# Patient Record
Sex: MALE | Race: OTHER | Employment: OTHER | URBAN - METROPOLITAN AREA
[De-identification: names, ages, dates, MRNs, and addresses within clinical notes are randomized per-mention and may not be internally consistent; named-entity substitution may affect disease eponyms.]

---

## 2017-05-12 NOTE — TELEPHONE ENCOUNTER
[place] and [place] not [place] in 2 months. Pt states he have enough medication. Advised to make appt when he return for future refills. Verbalized understanding.

## 2018-05-14 ENCOUNTER — PATIENT OUTREACH (OUTPATIENT)
Dept: CASE MANAGEMENT | Age: 73
End: 2018-05-14

## 2018-05-14 NOTE — PROGRESS NOTES
Outreached to patient in regards to enrollment to Chronic Care Management program. Patient stated that he no longer see's Dr. Harp Credit. Per patient he has a new PCP.

## 2020-04-19 ENCOUNTER — TELEPHONE (OUTPATIENT)
Dept: INTERNAL MEDICINE CLINIC | Facility: CLINIC | Age: 75
End: 2020-04-19

## 2021-06-22 ENCOUNTER — HOSPITAL ENCOUNTER (EMERGENCY)
Facility: HOSPITAL | Age: 76
Discharge: HOME | End: 2021-06-22
Payer: MEDICARE

## 2021-06-22 VITALS
WEIGHT: 200 LBS | TEMPERATURE: 98 F | HEIGHT: 65 IN | BODY MASS INDEX: 33.32 KG/M2 | OXYGEN SATURATION: 98 % | SYSTOLIC BLOOD PRESSURE: 128 MMHG | RESPIRATION RATE: 16 BRPM | DIASTOLIC BLOOD PRESSURE: 52 MMHG | HEART RATE: 64 BPM

## 2021-06-22 DIAGNOSIS — S61.411A LACERATION OF RIGHT HAND WITHOUT FOREIGN BODY, INITIAL ENCOUNTER: Primary | ICD-10-CM

## 2021-06-22 PROCEDURE — 99283 EMERGENCY DEPT VISIT LOW MDM: CPT

## 2021-06-22 PROCEDURE — 12002 RPR S/N/AX/GEN/TRNK2.6-7.5CM: CPT

## 2021-06-22 PROCEDURE — 99282 EMERGENCY DEPT VISIT SF MDM: CPT

## 2021-06-22 NOTE — ED PROVIDER NOTES
Patient Seen in: [facility] AND [facility] Emergency Department      History   Patient presents with:  Laceration/Abrasion    Stated Complaint: r hand lac    HPI/Subjective:   74yo/m with hx of GERD, HLD reports to the ED with right palm laceration.  Patient repo round, and reactive to light. Cardiovascular:      Rate and Rhythm: Normal rate and regular rhythm. Heart sounds: Normal heart sounds. Pulmonary:      Effort: Pulmonary effort is normal.      Breath sounds: Normal breath sounds.    Abdominal: days  For wound re-check    We recommend that you schedule follow up care with a primary care provider within the next three months to obtain basic health screening including reassessment of your blood pressure.       Medications Prescribed:  Current Discha

## 2021-06-22 NOTE — ED INITIAL ASSESSMENT (HPI)
Pt to ED with a lac to the right hand last night with a license plate. Pt's last tetanus was last year.

## 2021-06-22 NOTE — ED QUICK NOTES
Assumed care of patient from triage. Patient to ED from home for laceration. Patient reports cutting his right palm on a license plate while washing a car last night. No bleeding noted at this time.  Patient is alert, oriented x4, breathing with ease, in no

## 2021-09-11 ENCOUNTER — LAB REQUISITION (OUTPATIENT)
Dept: LAB | Age: 76
End: 2021-09-11

## 2021-09-11 ENCOUNTER — LAB SERVICES (OUTPATIENT)
Dept: LAB | Age: 76
End: 2021-09-11

## 2021-09-11 DIAGNOSIS — E11.9 TYPE 2 DIABETES MELLITUS WITHOUT COMPLICATIONS (CMD): ICD-10-CM

## 2021-09-11 DIAGNOSIS — E78.5 HYPERLIPIDEMIA, UNSPECIFIED: ICD-10-CM

## 2021-09-11 DIAGNOSIS — I10 ESSENTIAL (PRIMARY) HYPERTENSION: ICD-10-CM

## 2021-09-11 DIAGNOSIS — Z12.11 ENCOUNTER FOR SCREENING FOR MALIGNANT NEOPLASM OF COLON: ICD-10-CM

## 2021-09-11 DIAGNOSIS — Z12.5 ENCOUNTER FOR SCREENING FOR MALIGNANT NEOPLASM OF PROSTATE: ICD-10-CM

## 2021-09-11 LAB
ALBUMIN SERPL-MCNC: 3.7 G/DL (ref 3.6–5.1)
ALBUMIN/GLOB SERPL: 1.1 {RATIO} (ref 1–2.4)
ALP SERPL-CCNC: 93 UNITS/L (ref 45–117)
ALT SERPL-CCNC: 22 UNITS/L
ANION GAP SERPL CALC-SCNC: 12 MMOL/L (ref 10–20)
APPEARANCE UR: CLEAR
AST SERPL-CCNC: 17 UNITS/L
BILIRUB SERPL-MCNC: 0.6 MG/DL (ref 0.2–1)
BILIRUB UR QL STRIP: NEGATIVE
BUN SERPL-MCNC: 20 MG/DL (ref 6–20)
BUN/CREAT SERPL: 15 (ref 7–25)
CALCIUM SERPL-MCNC: 8.3 MG/DL (ref 8.4–10.2)
CHLORIDE SERPL-SCNC: 106 MMOL/L (ref 98–107)
CHOLEST SERPL-MCNC: 140 MG/DL
CHOLEST/HDLC SERPL: 4.1 {RATIO}
CO2 SERPL-SCNC: 27 MMOL/L (ref 21–32)
COLOR UR: YELLOW
CREAT SERPL-MCNC: 1.32 MG/DL (ref 0.67–1.17)
DEPRECATED RDW RBC: 42.7 FL (ref 39–50)
ERYTHROCYTE [DISTWIDTH] IN BLOOD: 13.3 % (ref 11–15)
FASTING DURATION TIME PATIENT: 12 HOURS (ref 0–999)
FASTING DURATION TIME PATIENT: 12 HOURS (ref 0–999)
GFR SERPLBLD BASED ON 1.73 SQ M-ARVRAT: 52 ML/MIN
GLOBULIN SER-MCNC: 3.3 G/DL (ref 2–4)
GLUCOSE SERPL-MCNC: 99 MG/DL (ref 65–99)
GLUCOSE UR STRIP-MCNC: NEGATIVE MG/DL
HBA1C MFR BLD: 6.4 % (ref 4.5–5.6)
HCT VFR BLD CALC: 40.2 % (ref 39–51)
HDLC SERPL-MCNC: 34 MG/DL
HGB BLD-MCNC: 13.1 G/DL (ref 13–17)
HGB UR QL STRIP: ABNORMAL
KETONES UR STRIP-MCNC: NEGATIVE MG/DL
LDLC SERPL CALC-MCNC: 68 MG/DL
LEUKOCYTE ESTERASE UR QL STRIP: NEGATIVE
MCH RBC QN AUTO: 28.5 PG (ref 26–34)
MCHC RBC AUTO-ENTMCNC: 32.6 G/DL (ref 32–36.5)
MCV RBC AUTO: 87.6 FL (ref 78–100)
NITRITE UR QL STRIP: NEGATIVE
NONHDLC SERPL-MCNC: 106 MG/DL
NRBC BLD MANUAL-RTO: 0 /100 WBC
PH UR STRIP: 7 [PH] (ref 5–7)
PLATELET # BLD AUTO: 193 K/MCL (ref 140–450)
POTASSIUM SERPL-SCNC: 5 MMOL/L (ref 3.4–5.1)
PROT SERPL-MCNC: 7 G/DL (ref 6.4–8.2)
PROT UR STRIP-MCNC: NEGATIVE MG/DL
PSA SERPL-MCNC: 2.87 NG/ML
RBC # BLD: 4.59 MIL/MCL (ref 4.5–5.9)
SODIUM SERPL-SCNC: 140 MMOL/L (ref 135–145)
SP GR UR STRIP: 1.01 (ref 1–1.03)
TRIGL SERPL-MCNC: 188 MG/DL
UROBILINOGEN UR STRIP-MCNC: 0.2 MG/DL
WBC # BLD: 7.5 K/MCL (ref 4.2–11)

## 2021-09-11 PROCEDURE — G0103 PSA SCREENING: HCPCS | Performed by: CLINICAL MEDICAL LABORATORY

## 2021-09-11 PROCEDURE — 36415 COLL VENOUS BLD VENIPUNCTURE: CPT | Performed by: CLINICAL MEDICAL LABORATORY

## 2021-09-11 PROCEDURE — 83036 HEMOGLOBIN GLYCOSYLATED A1C: CPT | Performed by: CLINICAL MEDICAL LABORATORY

## 2021-09-11 PROCEDURE — 85027 COMPLETE CBC AUTOMATED: CPT | Performed by: CLINICAL MEDICAL LABORATORY

## 2021-09-11 PROCEDURE — 80053 COMPREHEN METABOLIC PANEL: CPT | Performed by: CLINICAL MEDICAL LABORATORY

## 2021-09-11 PROCEDURE — 81003 URINALYSIS AUTO W/O SCOPE: CPT | Performed by: CLINICAL MEDICAL LABORATORY

## 2021-09-11 PROCEDURE — 80061 LIPID PANEL: CPT | Performed by: CLINICAL MEDICAL LABORATORY

## 2022-03-26 ENCOUNTER — LAB REQUISITION (OUTPATIENT)
Dept: LAB | Age: 77
End: 2022-03-26

## 2022-03-26 ENCOUNTER — LAB SERVICES (OUTPATIENT)
Dept: LAB | Age: 77
End: 2022-03-26

## 2022-03-26 DIAGNOSIS — E11.9 TYPE 2 DIABETES MELLITUS WITHOUT COMPLICATIONS (CMD): ICD-10-CM

## 2022-03-26 DIAGNOSIS — I10 ESSENTIAL (PRIMARY) HYPERTENSION: ICD-10-CM

## 2022-03-26 DIAGNOSIS — E78.5 HYPERLIPIDEMIA, UNSPECIFIED: ICD-10-CM

## 2022-03-26 LAB
ALBUMIN SERPL-MCNC: 3.9 G/DL (ref 3.6–5.1)
ALBUMIN/GLOB SERPL: 1.2 {RATIO} (ref 1–2.4)
ALP SERPL-CCNC: 99 UNITS/L (ref 45–117)
ALT SERPL-CCNC: 21 UNITS/L
ANION GAP SERPL CALC-SCNC: 11 MMOL/L (ref 10–20)
APPEARANCE UR: CLEAR
AST SERPL-CCNC: 14 UNITS/L
BILIRUB SERPL-MCNC: 0.7 MG/DL (ref 0.2–1)
BILIRUB UR QL STRIP: NEGATIVE
BUN SERPL-MCNC: 21 MG/DL (ref 6–20)
BUN/CREAT SERPL: 16 (ref 7–25)
CALCIUM SERPL-MCNC: 8.9 MG/DL (ref 8.4–10.2)
CHLORIDE SERPL-SCNC: 105 MMOL/L (ref 98–107)
CHOLEST SERPL-MCNC: 149 MG/DL
CHOLEST/HDLC SERPL: 3.9 {RATIO}
CO2 SERPL-SCNC: 27 MMOL/L (ref 21–32)
COLOR UR: YELLOW
CREAT SERPL-MCNC: 1.28 MG/DL (ref 0.67–1.17)
CREAT UR-MCNC: 85.7 MG/DL
DEPRECATED RDW RBC: 43.2 FL (ref 39–50)
ERYTHROCYTE [DISTWIDTH] IN BLOOD: 13.2 % (ref 11–15)
FASTING DURATION TIME PATIENT: 12 HOURS (ref 0–999)
FASTING DURATION TIME PATIENT: 12 HOURS (ref 0–999)
GFR SERPLBLD BASED ON 1.73 SQ M-ARVRAT: 54 ML/MIN
GLOBULIN SER-MCNC: 3.2 G/DL (ref 2–4)
GLUCOSE SERPL-MCNC: 100 MG/DL (ref 70–99)
GLUCOSE UR STRIP-MCNC: NEGATIVE MG/DL
HBA1C MFR BLD: 6.2 % (ref 4.5–5.6)
HCT VFR BLD CALC: 41.2 % (ref 39–51)
HDLC SERPL-MCNC: 38 MG/DL
HGB BLD-MCNC: 13.2 G/DL (ref 13–17)
HGB UR QL STRIP: ABNORMAL
KETONES UR STRIP-MCNC: NEGATIVE MG/DL
LDLC SERPL CALC-MCNC: 70 MG/DL
LEUKOCYTE ESTERASE UR QL STRIP: NEGATIVE
MCH RBC QN AUTO: 28.4 PG (ref 26–34)
MCHC RBC AUTO-ENTMCNC: 32 G/DL (ref 32–36.5)
MCV RBC AUTO: 88.6 FL (ref 78–100)
MICROALBUMIN UR-MCNC: 0.69 MG/DL
MICROALBUMIN/CREAT UR: 8.1 MG/G
NITRITE UR QL STRIP: NEGATIVE
NONHDLC SERPL-MCNC: 111 MG/DL
NRBC BLD MANUAL-RTO: 0 /100 WBC
PH UR STRIP: 7 [PH] (ref 5–7)
PLATELET # BLD AUTO: 177 K/MCL (ref 140–450)
POTASSIUM SERPL-SCNC: 5.3 MMOL/L (ref 3.4–5.1)
PROT SERPL-MCNC: 7.1 G/DL (ref 6.4–8.2)
PROT UR STRIP-MCNC: NEGATIVE MG/DL
RBC # BLD: 4.65 MIL/MCL (ref 4.5–5.9)
SODIUM SERPL-SCNC: 138 MMOL/L (ref 135–145)
SP GR UR STRIP: 1.01 (ref 1–1.03)
TRIGL SERPL-MCNC: 206 MG/DL
UROBILINOGEN UR STRIP-MCNC: 0.2 MG/DL
WBC # BLD: 7.2 K/MCL (ref 4.2–11)

## 2022-03-26 PROCEDURE — 36415 COLL VENOUS BLD VENIPUNCTURE: CPT | Performed by: CLINICAL MEDICAL LABORATORY

## 2022-03-26 PROCEDURE — 82043 UR ALBUMIN QUANTITATIVE: CPT | Performed by: CLINICAL MEDICAL LABORATORY

## 2022-03-26 PROCEDURE — 85027 COMPLETE CBC AUTOMATED: CPT | Performed by: CLINICAL MEDICAL LABORATORY

## 2022-03-26 PROCEDURE — 80053 COMPREHEN METABOLIC PANEL: CPT | Performed by: CLINICAL MEDICAL LABORATORY

## 2022-03-26 PROCEDURE — 81003 URINALYSIS AUTO W/O SCOPE: CPT | Performed by: CLINICAL MEDICAL LABORATORY

## 2022-03-26 PROCEDURE — 83036 HEMOGLOBIN GLYCOSYLATED A1C: CPT | Performed by: CLINICAL MEDICAL LABORATORY

## 2022-03-26 PROCEDURE — 82570 ASSAY OF URINE CREATININE: CPT | Performed by: CLINICAL MEDICAL LABORATORY

## 2022-03-26 PROCEDURE — 80061 LIPID PANEL: CPT | Performed by: CLINICAL MEDICAL LABORATORY

## 2022-11-12 ENCOUNTER — LAB SERVICES (OUTPATIENT)
Dept: LAB | Age: 77
End: 2022-11-12

## 2022-11-12 ENCOUNTER — LAB REQUISITION (OUTPATIENT)
Dept: LAB | Age: 77
End: 2022-11-12

## 2022-11-12 DIAGNOSIS — I10 ESSENTIAL (PRIMARY) HYPERTENSION: ICD-10-CM

## 2022-11-12 DIAGNOSIS — K21.9 GASTRO-ESOPHAGEAL REFLUX DISEASE WITHOUT ESOPHAGITIS: ICD-10-CM

## 2022-11-12 DIAGNOSIS — E11.9 TYPE 2 DIABETES MELLITUS WITHOUT COMPLICATIONS (CMD): ICD-10-CM

## 2022-11-12 DIAGNOSIS — Z12.11 ENCOUNTER FOR SCREENING FOR MALIGNANT NEOPLASM OF COLON: ICD-10-CM

## 2022-11-12 DIAGNOSIS — E78.5 HYPERLIPIDEMIA, UNSPECIFIED: ICD-10-CM

## 2022-11-12 DIAGNOSIS — Z12.5 ENCOUNTER FOR SCREENING FOR MALIGNANT NEOPLASM OF PROSTATE: ICD-10-CM

## 2022-11-12 LAB
ALBUMIN SERPL-MCNC: 3.9 G/DL (ref 3.6–5.1)
ALBUMIN/GLOB SERPL: 1.2 {RATIO} (ref 1–2.4)
ALP SERPL-CCNC: 98 UNITS/L (ref 45–117)
ALT SERPL-CCNC: 22 UNITS/L
ANION GAP SERPL CALC-SCNC: 9 MMOL/L (ref 7–19)
APPEARANCE UR: CLEAR
AST SERPL-CCNC: 17 UNITS/L
BILIRUB SERPL-MCNC: 0.7 MG/DL (ref 0.2–1)
BILIRUB UR QL STRIP: NEGATIVE
BUN SERPL-MCNC: 24 MG/DL (ref 6–20)
BUN/CREAT SERPL: 17 (ref 7–25)
CALCIUM SERPL-MCNC: 8.9 MG/DL (ref 8.4–10.2)
CHLORIDE SERPL-SCNC: 106 MMOL/L (ref 97–110)
CHOLEST SERPL-MCNC: 164 MG/DL
CHOLEST/HDLC SERPL: 4.2 {RATIO}
CO2 SERPL-SCNC: 28 MMOL/L (ref 21–32)
COLOR UR: COLORLESS
CREAT SERPL-MCNC: 1.4 MG/DL (ref 0.67–1.17)
CREAT UR-MCNC: 89.5 MG/DL
DEPRECATED RDW RBC: 43.2 FL (ref 39–50)
ERYTHROCYTE [DISTWIDTH] IN BLOOD: 13.2 % (ref 11–15)
FASTING DURATION TIME PATIENT: 12 HOURS (ref 0–999)
FASTING DURATION TIME PATIENT: 12 HOURS (ref 0–999)
GFR SERPLBLD BASED ON 1.73 SQ M-ARVRAT: 51 ML/MIN
GLOBULIN SER-MCNC: 3.2 G/DL (ref 2–4)
GLUCOSE SERPL-MCNC: 91 MG/DL (ref 70–99)
GLUCOSE UR STRIP-MCNC: NEGATIVE MG/DL
HBA1C MFR BLD: 6.2 % (ref 4.5–5.6)
HCT VFR BLD CALC: 41.2 % (ref 39–51)
HDLC SERPL-MCNC: 39 MG/DL
HGB BLD-MCNC: 13.1 G/DL (ref 13–17)
HGB UR QL STRIP: NEGATIVE
KETONES UR STRIP-MCNC: NEGATIVE MG/DL
LDLC SERPL CALC-MCNC: 75 MG/DL
LEUKOCYTE ESTERASE UR QL STRIP: NEGATIVE
MCH RBC QN AUTO: 28.3 PG (ref 26–34)
MCHC RBC AUTO-ENTMCNC: 31.8 G/DL (ref 32–36.5)
MCV RBC AUTO: 89 FL (ref 78–100)
MICROALBUMIN UR-MCNC: 0.58 MG/DL
MICROALBUMIN/CREAT UR: 6.5 MG/G
NITRITE UR QL STRIP: NEGATIVE
NONHDLC SERPL-MCNC: 125 MG/DL
NRBC BLD MANUAL-RTO: 0 /100 WBC
PH UR STRIP: 7.5 [PH] (ref 5–7)
PLATELET # BLD AUTO: 173 K/MCL (ref 140–450)
POTASSIUM SERPL-SCNC: 5.1 MMOL/L (ref 3.4–5.1)
PROT SERPL-MCNC: 7.1 G/DL (ref 6.4–8.2)
PROT UR STRIP-MCNC: NEGATIVE MG/DL
PSA SERPL-MCNC: 2.11 NG/ML
RBC # BLD: 4.63 MIL/MCL (ref 4.5–5.9)
SODIUM SERPL-SCNC: 138 MMOL/L (ref 135–145)
SP GR UR STRIP: 1.01 (ref 1–1.03)
TRIGL SERPL-MCNC: 251 MG/DL
UROBILINOGEN UR STRIP-MCNC: 0.2 MG/DL
WBC # BLD: 7.3 K/MCL (ref 4.2–11)

## 2022-11-12 PROCEDURE — 85027 COMPLETE CBC AUTOMATED: CPT | Performed by: CLINICAL MEDICAL LABORATORY

## 2022-11-12 PROCEDURE — G0103 PSA SCREENING: HCPCS | Performed by: CLINICAL MEDICAL LABORATORY

## 2022-11-12 PROCEDURE — 36415 COLL VENOUS BLD VENIPUNCTURE: CPT | Performed by: CLINICAL MEDICAL LABORATORY

## 2022-11-12 PROCEDURE — 82570 ASSAY OF URINE CREATININE: CPT | Performed by: CLINICAL MEDICAL LABORATORY

## 2022-11-12 PROCEDURE — 83036 HEMOGLOBIN GLYCOSYLATED A1C: CPT | Performed by: CLINICAL MEDICAL LABORATORY

## 2022-11-12 PROCEDURE — 80061 LIPID PANEL: CPT | Performed by: CLINICAL MEDICAL LABORATORY

## 2022-11-12 PROCEDURE — 80053 COMPREHEN METABOLIC PANEL: CPT | Performed by: CLINICAL MEDICAL LABORATORY

## 2022-11-12 PROCEDURE — 82043 UR ALBUMIN QUANTITATIVE: CPT | Performed by: CLINICAL MEDICAL LABORATORY

## 2022-11-12 PROCEDURE — 81003 URINALYSIS AUTO W/O SCOPE: CPT | Performed by: CLINICAL MEDICAL LABORATORY

## 2023-05-20 ENCOUNTER — LAB REQUISITION (OUTPATIENT)
Dept: LAB | Age: 78
End: 2023-05-20

## 2023-05-20 ENCOUNTER — LAB SERVICES (OUTPATIENT)
Dept: LAB | Age: 78
End: 2023-05-20

## 2023-05-20 DIAGNOSIS — E11.9 TYPE 2 DIABETES MELLITUS WITHOUT COMPLICATIONS (CMD): ICD-10-CM

## 2023-05-20 DIAGNOSIS — I10 ESSENTIAL (PRIMARY) HYPERTENSION: ICD-10-CM

## 2023-05-20 DIAGNOSIS — E78.5 HYPERLIPIDEMIA, UNSPECIFIED: ICD-10-CM

## 2023-05-20 LAB
ALBUMIN SERPL-MCNC: 3.9 G/DL (ref 3.6–5.1)
ALBUMIN/GLOB SERPL: 1.3 {RATIO} (ref 1–2.4)
ALP SERPL-CCNC: 97 UNITS/L (ref 45–117)
ALT SERPL-CCNC: 20 UNITS/L
ANION GAP SERPL CALC-SCNC: 9 MMOL/L (ref 7–19)
APPEARANCE UR: CLEAR
AST SERPL-CCNC: 15 UNITS/L
BILIRUB SERPL-MCNC: 0.7 MG/DL (ref 0.2–1)
BILIRUB UR QL STRIP: NEGATIVE
BUN SERPL-MCNC: 18 MG/DL (ref 6–20)
BUN/CREAT SERPL: 15 (ref 7–25)
CALCIUM SERPL-MCNC: 9 MG/DL (ref 8.4–10.2)
CHLORIDE SERPL-SCNC: 109 MMOL/L (ref 97–110)
CHOLEST SERPL-MCNC: 159 MG/DL
CHOLEST/HDLC SERPL: 3.8 {RATIO}
CO2 SERPL-SCNC: 26 MMOL/L (ref 21–32)
COLOR UR: COLORLESS
CREAT SERPL-MCNC: 1.19 MG/DL (ref 0.67–1.17)
CREAT UR-MCNC: 48.6 MG/DL
DEPRECATED RDW RBC: 42.7 FL (ref 39–50)
ERYTHROCYTE [DISTWIDTH] IN BLOOD: 13.1 % (ref 11–15)
FASTING DURATION TIME PATIENT: 12 HOURS (ref 0–999)
GFR SERPLBLD BASED ON 1.73 SQ M-ARVRAT: 63 ML/MIN
GLOBULIN SER-MCNC: 3.1 G/DL (ref 2–4)
GLUCOSE SERPL-MCNC: 101 MG/DL (ref 70–99)
GLUCOSE UR STRIP-MCNC: NEGATIVE MG/DL
HBA1C MFR BLD: 5.8 % (ref 4.5–5.6)
HCT VFR BLD CALC: 43.1 % (ref 39–51)
HDLC SERPL-MCNC: 42 MG/DL
HGB BLD-MCNC: 13.8 G/DL (ref 13–17)
HGB UR QL STRIP: ABNORMAL
KETONES UR STRIP-MCNC: NEGATIVE MG/DL
LDLC SERPL CALC-MCNC: 78 MG/DL
LEUKOCYTE ESTERASE UR QL STRIP: NEGATIVE
MCH RBC QN AUTO: 28.4 PG (ref 26–34)
MCHC RBC AUTO-ENTMCNC: 32 G/DL (ref 32–36.5)
MCV RBC AUTO: 88.7 FL (ref 78–100)
MICROALBUMIN UR-MCNC: <0.5 MG/DL
MICROALBUMIN/CREAT UR: NORMAL MG/G{CREAT}
NITRITE UR QL STRIP: NEGATIVE
NONHDLC SERPL-MCNC: 117 MG/DL
NRBC BLD MANUAL-RTO: 0 /100 WBC
PH UR STRIP: 7.5 [PH] (ref 5–7)
PLATELET # BLD AUTO: 186 K/MCL (ref 140–450)
POTASSIUM SERPL-SCNC: 4.9 MMOL/L (ref 3.4–5.1)
PROT SERPL-MCNC: 7 G/DL (ref 6.4–8.2)
PROT UR STRIP-MCNC: NEGATIVE MG/DL
RBC # BLD: 4.86 MIL/MCL (ref 4.5–5.9)
SODIUM SERPL-SCNC: 139 MMOL/L (ref 135–145)
SP GR UR STRIP: 1.01 (ref 1–1.03)
TRIGL SERPL-MCNC: 193 MG/DL
UROBILINOGEN UR STRIP-MCNC: 0.2 MG/DL
WBC # BLD: 6.1 K/MCL (ref 4.2–11)

## 2023-05-20 PROCEDURE — 83036 HEMOGLOBIN GLYCOSYLATED A1C: CPT | Performed by: CLINICAL MEDICAL LABORATORY

## 2023-05-20 PROCEDURE — 82043 UR ALBUMIN QUANTITATIVE: CPT | Performed by: CLINICAL MEDICAL LABORATORY

## 2023-05-20 PROCEDURE — 81003 URINALYSIS AUTO W/O SCOPE: CPT | Performed by: CLINICAL MEDICAL LABORATORY

## 2023-05-20 PROCEDURE — 85027 COMPLETE CBC AUTOMATED: CPT | Performed by: CLINICAL MEDICAL LABORATORY

## 2023-05-20 PROCEDURE — 80061 LIPID PANEL: CPT | Performed by: CLINICAL MEDICAL LABORATORY

## 2023-05-20 PROCEDURE — 36415 COLL VENOUS BLD VENIPUNCTURE: CPT | Performed by: CLINICAL MEDICAL LABORATORY

## 2023-05-20 PROCEDURE — 82570 ASSAY OF URINE CREATININE: CPT | Performed by: CLINICAL MEDICAL LABORATORY

## 2023-05-20 PROCEDURE — 80053 COMPREHEN METABOLIC PANEL: CPT | Performed by: CLINICAL MEDICAL LABORATORY

## 2023-08-25 ENCOUNTER — APPOINTMENT (OUTPATIENT)
Dept: CT IMAGING | Facility: HOSPITAL | Age: 78
End: 2023-08-25
Attending: EMERGENCY MEDICINE
Payer: MEDICARE

## 2023-08-25 ENCOUNTER — HOSPITAL ENCOUNTER (EMERGENCY)
Facility: HOSPITAL | Age: 78
Discharge: HOME | End: 2023-08-25
Attending: EMERGENCY MEDICINE
Payer: MEDICARE

## 2023-08-25 ENCOUNTER — APPOINTMENT (OUTPATIENT)
Dept: GENERAL RADIOLOGY | Facility: HOSPITAL | Age: 78
End: 2023-08-25
Attending: EMERGENCY MEDICINE
Payer: MEDICARE

## 2023-08-25 VITALS
HEART RATE: 58 BPM | WEIGHT: 198 LBS | RESPIRATION RATE: 24 BRPM | HEIGHT: 66 IN | OXYGEN SATURATION: 97 % | SYSTOLIC BLOOD PRESSURE: 117 MMHG | BODY MASS INDEX: 31.82 KG/M2 | TEMPERATURE: 97 F | DIASTOLIC BLOOD PRESSURE: 48 MMHG

## 2023-08-25 DIAGNOSIS — R10.13 EPIGASTRIC PAIN: Primary | ICD-10-CM

## 2023-08-25 DIAGNOSIS — R55 SYNCOPE, NEAR: ICD-10-CM

## 2023-08-25 DIAGNOSIS — R11.0 NAUSEA: ICD-10-CM

## 2023-08-25 LAB
ALBUMIN SERPL-MCNC: 3.7 G/DL (ref 3.4–5)
ALBUMIN/GLOB SERPL: 1 {RATIO} (ref 1–2)
ALP LIVER SERPL-CCNC: 90 U/L
ALT SERPL-CCNC: 21 U/L
ANION GAP SERPL CALC-SCNC: 6 MMOL/L (ref 0–18)
AST SERPL-CCNC: 24 U/L (ref 15–37)
ATRIAL RATE: 61 BPM
BASOPHILS # BLD AUTO: 0.06 X10(3) UL (ref 0–0.2)
BASOPHILS NFR BLD AUTO: 0.4 %
BILIRUB SERPL-MCNC: 0.6 MG/DL (ref 0.1–2)
BUN BLD-MCNC: 20 MG/DL (ref 7–18)
BUN/CREAT SERPL: 13.6 (ref 10–20)
CALCIUM BLD-MCNC: 8.8 MG/DL (ref 8.5–10.1)
CHLORIDE SERPL-SCNC: 108 MMOL/L (ref 98–112)
CO2 SERPL-SCNC: 25 MMOL/L (ref 21–32)
CREAT BLD-MCNC: 1.47 MG/DL
DEPRECATED RDW RBC AUTO: 43.4 FL (ref 35.1–46.3)
EGFRCR SERPLBLD CKD-EPI 2021: 49 ML/MIN/1.73M2 (ref 60–?)
EOSINOPHIL # BLD AUTO: 0.06 X10(3) UL (ref 0–0.7)
EOSINOPHIL NFR BLD AUTO: 0.4 %
ERYTHROCYTE [DISTWIDTH] IN BLOOD BY AUTOMATED COUNT: 13.4 % (ref 11–15)
GLOBULIN PLAS-MCNC: 3.7 G/DL (ref 2.8–4.4)
GLUCOSE BLD-MCNC: 150 MG/DL (ref 70–99)
GLUCOSE BLDC GLUCOMTR-MCNC: 154 MG/DL (ref 70–99)
HCT VFR BLD AUTO: 41.5 %
HGB BLD-MCNC: 13.4 G/DL
IMM GRANULOCYTES # BLD AUTO: 0.19 X10(3) UL (ref 0–1)
IMM GRANULOCYTES NFR BLD: 1.4 %
LIPASE SERPL-CCNC: 38 U/L (ref 13–75)
LYMPHOCYTES # BLD AUTO: 1.18 X10(3) UL (ref 1–4)
LYMPHOCYTES NFR BLD AUTO: 8.7 %
MCH RBC QN AUTO: 28.8 PG (ref 26–34)
MCHC RBC AUTO-ENTMCNC: 32.3 G/DL (ref 31–37)
MCV RBC AUTO: 89.2 FL
MONOCYTES # BLD AUTO: 0.61 X10(3) UL (ref 0.1–1)
MONOCYTES NFR BLD AUTO: 4.5 %
NEUTROPHILS # BLD AUTO: 11.48 X10 (3) UL (ref 1.5–7.7)
NEUTROPHILS # BLD AUTO: 11.48 X10(3) UL (ref 1.5–7.7)
NEUTROPHILS NFR BLD AUTO: 84.6 %
OSMOLALITY SERPL CALC.SUM OF ELEC: 293 MOSM/KG (ref 275–295)
P AXIS: 61 DEGREES
P-R INTERVAL: 268 MS
PLATELET # BLD AUTO: 173 10(3)UL (ref 150–450)
POTASSIUM SERPL-SCNC: 4.8 MMOL/L (ref 3.5–5.1)
PROT SERPL-MCNC: 7.4 G/DL (ref 6.4–8.2)
Q-T INTERVAL: 480 MS
QRS DURATION: 88 MS
QTC CALCULATION (BEZET): 483 MS
R AXIS: -34 DEGREES
RBC # BLD AUTO: 4.65 X10(6)UL
SODIUM SERPL-SCNC: 139 MMOL/L (ref 136–145)
T AXIS: 81 DEGREES
TROPONIN I HIGH SENSITIVITY: 11 NG/L
VENTRICULAR RATE: 61 BPM
WBC # BLD AUTO: 13.6 X10(3) UL (ref 4–11)

## 2023-08-25 PROCEDURE — 99284 EMERGENCY DEPT VISIT MOD MDM: CPT

## 2023-08-25 PROCEDURE — 84484 ASSAY OF TROPONIN QUANT: CPT | Performed by: EMERGENCY MEDICINE

## 2023-08-25 PROCEDURE — 82962 GLUCOSE BLOOD TEST: CPT

## 2023-08-25 PROCEDURE — 36415 COLL VENOUS BLD VENIPUNCTURE: CPT

## 2023-08-25 PROCEDURE — 93005 ELECTROCARDIOGRAM TRACING: CPT

## 2023-08-25 PROCEDURE — 71045 X-RAY EXAM CHEST 1 VIEW: CPT | Performed by: EMERGENCY MEDICINE

## 2023-08-25 PROCEDURE — 83690 ASSAY OF LIPASE: CPT | Performed by: EMERGENCY MEDICINE

## 2023-08-25 PROCEDURE — 70450 CT HEAD/BRAIN W/O DYE: CPT | Performed by: EMERGENCY MEDICINE

## 2023-08-25 PROCEDURE — 99285 EMERGENCY DEPT VISIT HI MDM: CPT

## 2023-08-25 PROCEDURE — 85025 COMPLETE CBC W/AUTO DIFF WBC: CPT | Performed by: EMERGENCY MEDICINE

## 2023-08-25 PROCEDURE — 93010 ELECTROCARDIOGRAM REPORT: CPT

## 2023-08-25 PROCEDURE — 80053 COMPREHEN METABOLIC PANEL: CPT | Performed by: EMERGENCY MEDICINE

## 2023-08-25 RX ORDER — ONDANSETRON 4 MG/1
4 TABLET, ORALLY DISINTEGRATING ORAL EVERY 4 HOURS PRN
Qty: 10 TABLET | Refills: 0 | Status: SHIPPED | OUTPATIENT
Start: 2023-08-25 | End: 2023-09-01

## 2023-08-25 RX ORDER — MAGNESIUM HYDROXIDE/ALUMINUM HYDROXICE/SIMETHICONE 120; 1200; 1200 MG/30ML; MG/30ML; MG/30ML
30 SUSPENSION ORAL ONCE
Status: COMPLETED | OUTPATIENT
Start: 2023-08-25 | End: 2023-08-25

## 2023-08-25 NOTE — ED INITIAL ASSESSMENT (HPI)
Pt ambulatory to ED A&O x 4 w/ c/o dizziness, n/v, and possible seizure per pts friend. Pt was sitting in a chair and started, \"shaking and choking. \"  Pt denies headache, vision changes, No focal neuro deficits, no hx of seizures.

## 2023-08-25 NOTE — DISCHARGE INSTRUCTIONS
Continue pantoprazole as previously prescribed. Take Zofran as needed for nausea or vomiting. Follow-up with your primary physician for further evaluation early next week. Return to the emergency department if fainting or shaking recurs, increasing abdominal pain develops, or other new symptoms develop.

## 2023-10-30 ENCOUNTER — LAB REQUISITION (OUTPATIENT)
Dept: LAB | Age: 78
End: 2023-10-30

## 2023-10-30 DIAGNOSIS — E11.9 TYPE 2 DIABETES MELLITUS WITHOUT COMPLICATIONS (CMD): ICD-10-CM

## 2023-10-30 DIAGNOSIS — E78.5 HYPERLIPIDEMIA, UNSPECIFIED: ICD-10-CM

## 2023-11-04 ENCOUNTER — LAB SERVICES (OUTPATIENT)
Dept: LAB | Age: 78
End: 2023-11-04

## 2023-11-04 LAB
ALBUMIN SERPL-MCNC: 3.7 G/DL (ref 3.6–5.1)
ALBUMIN/GLOB SERPL: 1.1 {RATIO} (ref 1–2.4)
ALP SERPL-CCNC: 100 UNITS/L (ref 45–117)
ALT SERPL-CCNC: 19 UNITS/L
ANION GAP SERPL CALC-SCNC: 9 MMOL/L (ref 7–19)
APPEARANCE UR: CLEAR
AST SERPL-CCNC: 14 UNITS/L
BILIRUB SERPL-MCNC: 0.7 MG/DL (ref 0.2–1)
BILIRUB UR QL STRIP: NEGATIVE
BUN SERPL-MCNC: 19 MG/DL (ref 6–20)
BUN/CREAT SERPL: 12 (ref 7–25)
CALCIUM SERPL-MCNC: 9.1 MG/DL (ref 8.4–10.2)
CHLORIDE SERPL-SCNC: 104 MMOL/L (ref 97–110)
CHOLEST SERPL-MCNC: 141 MG/DL
CHOLEST/HDLC SERPL: 3.9 {RATIO}
CO2 SERPL-SCNC: 29 MMOL/L (ref 21–32)
COLOR UR: ABNORMAL
CREAT SERPL-MCNC: 1.56 MG/DL (ref 0.67–1.17)
EGFRCR SERPLBLD CKD-EPI 2021: 45 ML/MIN/{1.73_M2}
FASTING DURATION TIME PATIENT: 12 HOURS (ref 0–999)
GLOBULIN SER-MCNC: 3.4 G/DL (ref 2–4)
GLUCOSE SERPL-MCNC: 102 MG/DL (ref 70–99)
GLUCOSE UR STRIP-MCNC: NEGATIVE MG/DL
HBA1C MFR BLD: 6 % (ref 4.5–5.6)
HDLC SERPL-MCNC: 36 MG/DL
HGB UR QL STRIP: NEGATIVE
KETONES UR STRIP-MCNC: NEGATIVE MG/DL
LDLC SERPL CALC-MCNC: 66 MG/DL
LEUKOCYTE ESTERASE UR QL STRIP: NEGATIVE
NITRITE UR QL STRIP: NEGATIVE
NONHDLC SERPL-MCNC: 105 MG/DL
PH UR STRIP: 8 [PH] (ref 5–7)
POTASSIUM SERPL-SCNC: 4.7 MMOL/L (ref 3.4–5.1)
PROT SERPL-MCNC: 7.1 G/DL (ref 6.4–8.2)
PROT UR STRIP-MCNC: NEGATIVE MG/DL
SODIUM SERPL-SCNC: 137 MMOL/L (ref 135–145)
SP GR UR STRIP: 1.02 (ref 1–1.03)
TRIGL SERPL-MCNC: 197 MG/DL
UROBILINOGEN UR STRIP-MCNC: 0.2 MG/DL

## 2023-11-04 PROCEDURE — 83036 HEMOGLOBIN GLYCOSYLATED A1C: CPT | Performed by: CLINICAL MEDICAL LABORATORY

## 2023-11-04 PROCEDURE — 80061 LIPID PANEL: CPT | Performed by: CLINICAL MEDICAL LABORATORY

## 2023-11-04 PROCEDURE — 81003 URINALYSIS AUTO W/O SCOPE: CPT | Performed by: CLINICAL MEDICAL LABORATORY

## 2023-11-04 PROCEDURE — 80053 COMPREHEN METABOLIC PANEL: CPT | Performed by: CLINICAL MEDICAL LABORATORY

## 2023-11-04 PROCEDURE — 36415 COLL VENOUS BLD VENIPUNCTURE: CPT | Performed by: CLINICAL MEDICAL LABORATORY

## 2024-04-20 ENCOUNTER — LAB REQUISITION (OUTPATIENT)
Dept: LAB | Age: 79
End: 2024-04-20

## 2024-04-20 ENCOUNTER — LAB SERVICES (OUTPATIENT)
Dept: LAB | Age: 79
End: 2024-04-20

## 2024-04-20 DIAGNOSIS — E78.5 HYPERLIPIDEMIA, UNSPECIFIED: ICD-10-CM

## 2024-04-20 DIAGNOSIS — Z12.5 ENCOUNTER FOR SCREENING FOR MALIGNANT NEOPLASM OF PROSTATE: ICD-10-CM

## 2024-04-20 DIAGNOSIS — E11.9 TYPE 2 DIABETES MELLITUS WITHOUT COMPLICATIONS  (CMD): ICD-10-CM

## 2024-04-20 DIAGNOSIS — I10 ESSENTIAL (PRIMARY) HYPERTENSION: ICD-10-CM

## 2024-04-20 LAB
ALBUMIN SERPL-MCNC: 4 G/DL (ref 3.6–5.1)
ALBUMIN/GLOB SERPL: 1.3 {RATIO} (ref 1–2.4)
ALP SERPL-CCNC: 107 UNITS/L (ref 45–117)
ALT SERPL-CCNC: 19 UNITS/L
ANION GAP SERPL CALC-SCNC: 9 MMOL/L (ref 7–19)
APPEARANCE UR: CLEAR
AST SERPL-CCNC: 16 UNITS/L
BILIRUB SERPL-MCNC: 0.7 MG/DL (ref 0.2–1)
BILIRUB UR QL STRIP: NEGATIVE
BUN SERPL-MCNC: 16 MG/DL (ref 6–20)
BUN/CREAT SERPL: 13 (ref 7–25)
CALCIUM SERPL-MCNC: 9.4 MG/DL (ref 8.4–10.2)
CHLORIDE SERPL-SCNC: 108 MMOL/L (ref 97–110)
CHOLEST SERPL-MCNC: 155 MG/DL
CHOLEST/HDLC SERPL: 3.7 {RATIO}
CO2 SERPL-SCNC: 28 MMOL/L (ref 21–32)
COLOR UR: ABNORMAL
CREAT SERPL-MCNC: 1.26 MG/DL (ref 0.67–1.17)
CREAT UR-MCNC: 90.1 MG/DL
DEPRECATED RDW RBC: 43.1 FL (ref 39–50)
EGFRCR SERPLBLD CKD-EPI 2021: 58 ML/MIN/{1.73_M2}
ERYTHROCYTE [DISTWIDTH] IN BLOOD: 13.3 % (ref 11–15)
FASTING DURATION TIME PATIENT: 12 HOURS (ref 0–999)
GLOBULIN SER-MCNC: 3 G/DL (ref 2–4)
GLUCOSE SERPL-MCNC: 100 MG/DL (ref 70–99)
GLUCOSE UR STRIP-MCNC: NEGATIVE MG/DL
HBA1C MFR BLD: 5.9 % (ref 4.5–5.6)
HCT VFR BLD CALC: 42.3 % (ref 39–51)
HDLC SERPL-MCNC: 42 MG/DL
HGB BLD-MCNC: 13.6 G/DL (ref 13–17)
HGB UR QL STRIP: NEGATIVE
KETONES UR STRIP-MCNC: NEGATIVE MG/DL
LDLC SERPL CALC-MCNC: 80 MG/DL
LEUKOCYTE ESTERASE UR QL STRIP: NEGATIVE
MCH RBC QN AUTO: 28.3 PG (ref 26–34)
MCHC RBC AUTO-ENTMCNC: 32.2 G/DL (ref 32–36.5)
MCV RBC AUTO: 88.1 FL (ref 78–100)
MICROALBUMIN UR-MCNC: 0.67 MG/DL
MICROALBUMIN/CREAT UR: 7.4 MG/G
NITRITE UR QL STRIP: NEGATIVE
NONHDLC SERPL-MCNC: 113 MG/DL
NRBC BLD MANUAL-RTO: 0 /100 WBC
PH UR STRIP: 8 [PH] (ref 5–7)
PLATELET # BLD AUTO: 183 K/MCL (ref 140–450)
POTASSIUM SERPL-SCNC: 5.5 MMOL/L (ref 3.4–5.1)
PROT SERPL-MCNC: 7 G/DL (ref 6.4–8.2)
PROT UR STRIP-MCNC: NEGATIVE MG/DL
PSA SERPL-MCNC: 3.64 NG/ML
RBC # BLD: 4.8 MIL/MCL (ref 4.5–5.9)
SODIUM SERPL-SCNC: 139 MMOL/L (ref 135–145)
SP GR UR STRIP: 1.02 (ref 1–1.03)
TRIGL SERPL-MCNC: 166 MG/DL
UROBILINOGEN UR STRIP-MCNC: 0.2 MG/DL
WBC # BLD: 7.6 K/MCL (ref 4.2–11)

## 2024-04-20 PROCEDURE — 82043 UR ALBUMIN QUANTITATIVE: CPT | Performed by: CLINICAL MEDICAL LABORATORY

## 2024-04-20 PROCEDURE — 85027 COMPLETE CBC AUTOMATED: CPT | Performed by: CLINICAL MEDICAL LABORATORY

## 2024-04-20 PROCEDURE — 82570 ASSAY OF URINE CREATININE: CPT | Performed by: CLINICAL MEDICAL LABORATORY

## 2024-04-20 PROCEDURE — 83036 HEMOGLOBIN GLYCOSYLATED A1C: CPT | Performed by: CLINICAL MEDICAL LABORATORY

## 2024-04-20 PROCEDURE — G0103 PSA SCREENING: HCPCS | Performed by: CLINICAL MEDICAL LABORATORY

## 2024-04-20 PROCEDURE — 81003 URINALYSIS AUTO W/O SCOPE: CPT | Performed by: CLINICAL MEDICAL LABORATORY

## 2024-04-20 PROCEDURE — 80061 LIPID PANEL: CPT | Performed by: CLINICAL MEDICAL LABORATORY

## 2024-04-20 PROCEDURE — 80053 COMPREHEN METABOLIC PANEL: CPT | Performed by: CLINICAL MEDICAL LABORATORY

## 2024-11-30 ENCOUNTER — LAB REQUISITION (OUTPATIENT)
Dept: LAB | Age: 79
End: 2024-11-30

## 2024-11-30 ENCOUNTER — LAB SERVICES (OUTPATIENT)
Dept: LAB | Age: 79
End: 2024-11-30

## 2024-11-30 DIAGNOSIS — E11.9 TYPE 2 DIABETES MELLITUS WITHOUT COMPLICATIONS  (CMD): ICD-10-CM

## 2024-11-30 DIAGNOSIS — E78.5 HYPERLIPIDEMIA, UNSPECIFIED: ICD-10-CM

## 2024-11-30 DIAGNOSIS — I10 ESSENTIAL (PRIMARY) HYPERTENSION: ICD-10-CM

## 2024-11-30 LAB
ALBUMIN SERPL-MCNC: 4 G/DL (ref 3.4–5)
ALBUMIN/GLOB SERPL: 1.3 {RATIO} (ref 1–2.4)
ALP SERPL-CCNC: 111 UNITS/L (ref 45–117)
ALT SERPL-CCNC: 15 UNITS/L
ANION GAP SERPL CALC-SCNC: 9 MMOL/L (ref 7–19)
APPEARANCE UR: CLEAR
AST SERPL-CCNC: 14 UNITS/L
BILIRUB SERPL-MCNC: 0.5 MG/DL (ref 0.2–1)
BILIRUB UR QL STRIP: NEGATIVE
BUN SERPL-MCNC: 22 MG/DL (ref 6–20)
BUN/CREAT SERPL: 15 (ref 7–25)
CALCIUM SERPL-MCNC: 9.6 MG/DL (ref 8.4–10.2)
CHLORIDE SERPL-SCNC: 106 MMOL/L (ref 97–110)
CHOLEST SERPL-MCNC: 188 MG/DL
CHOLEST/HDLC SERPL: 4 {RATIO}
CO2 SERPL-SCNC: 29 MMOL/L (ref 21–32)
COLOR UR: ABNORMAL
CREAT SERPL-MCNC: 1.46 MG/DL (ref 0.67–1.17)
DEPRECATED RDW RBC: 44.6 FL (ref 39–50)
EGFRCR SERPLBLD CKD-EPI 2021: 48 ML/MIN/{1.73_M2}
ERYTHROCYTE [DISTWIDTH] IN BLOOD: 13.7 % (ref 11–15)
FASTING DURATION TIME PATIENT: 12 HOURS (ref 0–999)
GLOBULIN SER-MCNC: 3.2 G/DL (ref 2–4)
GLUCOSE SERPL-MCNC: 104 MG/DL (ref 70–99)
GLUCOSE UR STRIP-MCNC: NEGATIVE MG/DL
HBA1C MFR BLD: 5.9 % (ref 4.5–5.6)
HCT VFR BLD CALC: 43.6 % (ref 39–51)
HDLC SERPL-MCNC: 47 MG/DL
HGB BLD-MCNC: 13.7 G/DL (ref 13–17)
HGB UR QL STRIP: ABNORMAL
KETONES UR STRIP-MCNC: NEGATIVE MG/DL
LDLC SERPL CALC-MCNC: 102 MG/DL
LEUKOCYTE ESTERASE UR QL STRIP: NEGATIVE
MCH RBC QN AUTO: 28.1 PG (ref 26–34)
MCHC RBC AUTO-ENTMCNC: 31.4 G/DL (ref 32–36.5)
MCV RBC AUTO: 89.5 FL (ref 78–100)
NITRITE UR QL STRIP: NEGATIVE
NONHDLC SERPL-MCNC: 141 MG/DL
NRBC BLD MANUAL-RTO: 0 /100 WBC
PH UR STRIP: 8 [PH] (ref 5–7)
PLATELET # BLD AUTO: 206 K/MCL (ref 140–450)
POTASSIUM SERPL-SCNC: 4.8 MMOL/L (ref 3.4–5.1)
PROT SERPL-MCNC: 7.2 G/DL (ref 6.4–8.2)
PROT UR STRIP-MCNC: NEGATIVE MG/DL
RBC # BLD: 4.87 MIL/MCL (ref 4.5–5.9)
SODIUM SERPL-SCNC: 139 MMOL/L (ref 135–145)
SP GR UR STRIP: 1.02 (ref 1–1.03)
TRIGL SERPL-MCNC: 193 MG/DL
UROBILINOGEN UR STRIP-MCNC: 0.2 MG/DL
WBC # BLD: 7.3 K/MCL (ref 4.2–11)

## 2024-11-30 PROCEDURE — 83036 HEMOGLOBIN GLYCOSYLATED A1C: CPT | Performed by: CLINICAL MEDICAL LABORATORY

## 2024-11-30 PROCEDURE — 85027 COMPLETE CBC AUTOMATED: CPT | Performed by: CLINICAL MEDICAL LABORATORY

## 2024-11-30 PROCEDURE — 81003 URINALYSIS AUTO W/O SCOPE: CPT | Performed by: CLINICAL MEDICAL LABORATORY

## 2024-11-30 PROCEDURE — 36415 COLL VENOUS BLD VENIPUNCTURE: CPT | Performed by: CLINICAL MEDICAL LABORATORY

## 2024-11-30 PROCEDURE — 80053 COMPREHEN METABOLIC PANEL: CPT | Performed by: CLINICAL MEDICAL LABORATORY

## 2024-11-30 PROCEDURE — 80061 LIPID PANEL: CPT | Performed by: CLINICAL MEDICAL LABORATORY

## 2025-03-15 ENCOUNTER — HOSPITAL ENCOUNTER (INPATIENT)
Facility: HOSPITAL | Age: 80
LOS: 5 days | Discharge: HOME HEALTH | End: 2025-03-21
Attending: EMERGENCY MEDICINE | Admitting: INTERNAL MEDICINE
Payer: MEDICARE

## 2025-03-15 DIAGNOSIS — K81.0 ACUTE CHOLECYSTITIS: ICD-10-CM

## 2025-03-15 DIAGNOSIS — K85.90 ACUTE PANCREATITIS, UNSPECIFIED COMPLICATION STATUS, UNSPECIFIED PANCREATITIS TYPE (HCC): Primary | ICD-10-CM

## 2025-03-15 LAB
ALBUMIN SERPL-MCNC: 4.2 G/DL (ref 3.2–4.8)
ALBUMIN/GLOB SERPL: 1.6 {RATIO} (ref 1–2)
ALP LIVER SERPL-CCNC: 197 U/L
ALT SERPL-CCNC: 92 U/L
ANION GAP SERPL CALC-SCNC: 12 MMOL/L (ref 0–18)
AST SERPL-CCNC: 233 U/L (ref ?–34)
BASOPHILS # BLD AUTO: 0.05 X10(3) UL (ref 0–0.2)
BASOPHILS NFR BLD AUTO: 0.3 %
BILIRUB SERPL-MCNC: 1 MG/DL (ref 0.2–1.1)
BUN BLD-MCNC: 18 MG/DL (ref 9–23)
BUN/CREAT SERPL: 11.5 (ref 10–20)
CALCIUM BLD-MCNC: 8.7 MG/DL (ref 8.7–10.4)
CHLORIDE SERPL-SCNC: 105 MMOL/L (ref 98–112)
CO2 SERPL-SCNC: 24 MMOL/L (ref 21–32)
CREAT BLD-MCNC: 1.56 MG/DL
DEPRECATED RDW RBC AUTO: 42.4 FL (ref 35.1–46.3)
EGFRCR SERPLBLD CKD-EPI 2021: 45 ML/MIN/1.73M2 (ref 60–?)
EOSINOPHIL # BLD AUTO: 0.11 X10(3) UL (ref 0–0.7)
EOSINOPHIL NFR BLD AUTO: 0.6 %
ERYTHROCYTE [DISTWIDTH] IN BLOOD BY AUTOMATED COUNT: 13.5 % (ref 11–15)
GLOBULIN PLAS-MCNC: 2.6 G/DL (ref 2–3.5)
GLUCOSE BLD-MCNC: 284 MG/DL (ref 70–99)
HCT VFR BLD AUTO: 40.3 %
HGB BLD-MCNC: 13.1 G/DL
IMM GRANULOCYTES # BLD AUTO: 0.2 X10(3) UL (ref 0–1)
IMM GRANULOCYTES NFR BLD: 1 %
LYMPHOCYTES # BLD AUTO: 2.46 X10(3) UL (ref 1–4)
LYMPHOCYTES NFR BLD AUTO: 12.4 %
MCH RBC QN AUTO: 28 PG (ref 26–34)
MCHC RBC AUTO-ENTMCNC: 32.5 G/DL (ref 31–37)
MCV RBC AUTO: 86.1 FL
MONOCYTES # BLD AUTO: 1.15 X10(3) UL (ref 0.1–1)
MONOCYTES NFR BLD AUTO: 5.8 %
NEUTROPHILS # BLD AUTO: 15.81 X10 (3) UL (ref 1.5–7.7)
NEUTROPHILS # BLD AUTO: 15.81 X10(3) UL (ref 1.5–7.7)
NEUTROPHILS NFR BLD AUTO: 79.9 %
OSMOLALITY SERPL CALC.SUM OF ELEC: 304 MOSM/KG (ref 275–295)
PLATELET # BLD AUTO: 225 10(3)UL (ref 150–450)
POTASSIUM SERPL-SCNC: 3.7 MMOL/L (ref 3.5–5.1)
PROT SERPL-MCNC: 6.8 G/DL (ref 5.7–8.2)
RBC # BLD AUTO: 4.68 X10(6)UL
SODIUM SERPL-SCNC: 141 MMOL/L (ref 136–145)
TROPONIN I SERPL HS-MCNC: 5 NG/L
WBC # BLD AUTO: 19.8 X10(3) UL (ref 4–11)

## 2025-03-15 PROCEDURE — 93010 ELECTROCARDIOGRAM REPORT: CPT

## 2025-03-15 PROCEDURE — 99291 CRITICAL CARE FIRST HOUR: CPT

## 2025-03-15 PROCEDURE — 93005 ELECTROCARDIOGRAM TRACING: CPT

## 2025-03-15 PROCEDURE — 84478 ASSAY OF TRIGLYCERIDES: CPT | Performed by: EMERGENCY MEDICINE

## 2025-03-15 PROCEDURE — 83036 HEMOGLOBIN GLYCOSYLATED A1C: CPT | Performed by: INTERNAL MEDICINE

## 2025-03-15 PROCEDURE — 84484 ASSAY OF TROPONIN QUANT: CPT | Performed by: EMERGENCY MEDICINE

## 2025-03-15 PROCEDURE — 96375 TX/PRO/DX INJ NEW DRUG ADDON: CPT

## 2025-03-15 PROCEDURE — S0028 INJECTION, FAMOTIDINE, 20 MG: HCPCS | Performed by: EMERGENCY MEDICINE

## 2025-03-15 PROCEDURE — 80053 COMPREHEN METABOLIC PANEL: CPT | Performed by: EMERGENCY MEDICINE

## 2025-03-15 PROCEDURE — 99285 EMERGENCY DEPT VISIT HI MDM: CPT

## 2025-03-15 PROCEDURE — 83690 ASSAY OF LIPASE: CPT | Performed by: EMERGENCY MEDICINE

## 2025-03-15 PROCEDURE — 85025 COMPLETE CBC W/AUTO DIFF WBC: CPT | Performed by: EMERGENCY MEDICINE

## 2025-03-15 RX ORDER — MAGNESIUM HYDROXIDE/ALUMINUM HYDROXICE/SIMETHICONE 120; 1200; 1200 MG/30ML; MG/30ML; MG/30ML
30 SUSPENSION ORAL ONCE
Status: COMPLETED | OUTPATIENT
Start: 2025-03-15 | End: 2025-03-15

## 2025-03-15 RX ORDER — ONDANSETRON 2 MG/ML
4 INJECTION INTRAMUSCULAR; INTRAVENOUS ONCE
Status: COMPLETED | OUTPATIENT
Start: 2025-03-15 | End: 2025-03-15

## 2025-03-15 RX ORDER — FAMOTIDINE 10 MG/ML
20 INJECTION, SOLUTION INTRAVENOUS ONCE
Status: COMPLETED | OUTPATIENT
Start: 2025-03-15 | End: 2025-03-15

## 2025-03-16 ENCOUNTER — APPOINTMENT (OUTPATIENT)
Dept: CT IMAGING | Facility: HOSPITAL | Age: 80
End: 2025-03-16
Attending: EMERGENCY MEDICINE
Payer: MEDICARE

## 2025-03-16 ENCOUNTER — APPOINTMENT (OUTPATIENT)
Dept: ULTRASOUND IMAGING | Facility: HOSPITAL | Age: 80
End: 2025-03-16
Attending: EMERGENCY MEDICINE
Payer: MEDICARE

## 2025-03-16 ENCOUNTER — APPOINTMENT (OUTPATIENT)
Dept: MRI IMAGING | Facility: HOSPITAL | Age: 80
End: 2025-03-16
Attending: EMERGENCY MEDICINE
Payer: MEDICARE

## 2025-03-16 PROBLEM — K85.90 ACUTE PANCREATITIS, UNSPECIFIED COMPLICATION STATUS, UNSPECIFIED PANCREATITIS TYPE (HCC): Status: ACTIVE | Noted: 2025-03-16

## 2025-03-16 PROBLEM — K81.0 ACUTE CHOLECYSTITIS: Status: ACTIVE | Noted: 2025-03-16

## 2025-03-16 PROBLEM — K85.90 PANCREATITIS (HCC): Status: ACTIVE | Noted: 2025-03-16

## 2025-03-16 LAB
ALBUMIN SERPL-MCNC: 4.3 G/DL (ref 3.2–4.8)
ALBUMIN/GLOB SERPL: 1.7 {RATIO} (ref 1–2)
ALP LIVER SERPL-CCNC: 192 U/L
ALT SERPL-CCNC: 184 U/L
ANION GAP SERPL CALC-SCNC: 11 MMOL/L (ref 0–18)
APTT PPP: 22.8 SECONDS (ref 23–36)
AST SERPL-CCNC: 350 U/L (ref ?–34)
ATRIAL RATE: 87 BPM
BILIRUB SERPL-MCNC: 0.6 MG/DL (ref 0.2–1.1)
BUN BLD-MCNC: 19 MG/DL (ref 9–23)
BUN BLD-MCNC: 19 MG/DL (ref 9–23)
BUN BLD-MCNC: 20 MG/DL (ref 9–23)
BUN/CREAT SERPL: 12.8 (ref 10–20)
CALCIUM BLD-MCNC: 8.7 MG/DL (ref 8.7–10.4)
CHLORIDE SERPL-SCNC: 107 MMOL/L (ref 98–112)
CO2 SERPL-SCNC: 24 MMOL/L (ref 21–32)
CREAT BLD-MCNC: 1.48 MG/DL
DEPRECATED RDW RBC AUTO: 42.3 FL (ref 35.1–46.3)
EGFRCR SERPLBLD CKD-EPI 2021: 48 ML/MIN/1.73M2 (ref 60–?)
ERYTHROCYTE [DISTWIDTH] IN BLOOD BY AUTOMATED COUNT: 13.5 % (ref 11–15)
EST. AVERAGE GLUCOSE BLD GHB EST-MCNC: 131 MG/DL (ref 68–126)
GLOBULIN PLAS-MCNC: 2.6 G/DL (ref 2–3.5)
GLUCOSE BLD-MCNC: 183 MG/DL (ref 70–99)
GLUCOSE BLDC GLUCOMTR-MCNC: 123 MG/DL (ref 70–99)
GLUCOSE BLDC GLUCOMTR-MCNC: 151 MG/DL (ref 70–99)
GLUCOSE BLDC GLUCOMTR-MCNC: 165 MG/DL (ref 70–99)
GLUCOSE BLDC GLUCOMTR-MCNC: 174 MG/DL (ref 70–99)
GLUCOSE BLDC GLUCOMTR-MCNC: 207 MG/DL (ref 70–99)
HBA1C MFR BLD: 6.2 % (ref ?–5.7)
HCT VFR BLD AUTO: 40.8 %
HCT VFR BLD AUTO: 41.5 %
HCT VFR BLD AUTO: 41.6 %
HCT VFR BLD AUTO: 44.7 %
HGB BLD-MCNC: 13.6 G/DL
HGB BLD-MCNC: 14.1 G/DL
HGB BLD-MCNC: 14.2 G/DL
HGB BLD-MCNC: 14.4 G/DL
INR BLD: 0.96 (ref 0.8–1.2)
LACTATE SERPL-SCNC: 3.4 MMOL/L (ref 0.5–2)
LACTATE SERPL-SCNC: 3.8 MMOL/L (ref 0.5–2)
LACTATE SERPL-SCNC: 3.9 MMOL/L (ref 0.5–2)
LIPASE SERPL-CCNC: >3500 U/L (ref 12–53)
MCH RBC QN AUTO: 28 PG (ref 26–34)
MCHC RBC AUTO-ENTMCNC: 32.8 G/DL (ref 31–37)
MCV RBC AUTO: 85.6 FL
OSMOLALITY SERPL CALC.SUM OF ELEC: 301 MOSM/KG (ref 275–295)
P AXIS: 63 DEGREES
PLATELET # BLD AUTO: 162 10(3)UL (ref 150–450)
POTASSIUM SERPL-SCNC: 4.5 MMOL/L (ref 3.5–5.1)
PROT SERPL-MCNC: 6.9 G/DL (ref 5.7–8.2)
PROTHROMBIN TIME: 13.4 SECONDS (ref 11.6–14.8)
Q-T INTERVAL: 440 MS
QRS DURATION: 94 MS
QTC CALCULATION (BEZET): 435 MS
R AXIS: -33 DEGREES
RBC # BLD AUTO: 4.85 X10(6)UL
SODIUM SERPL-SCNC: 142 MMOL/L (ref 136–145)
T AXIS: 61 DEGREES
TRIGL SERPL-MCNC: 97 MG/DL (ref 30–149)
VENTRICULAR RATE: 59 BPM
WBC # BLD AUTO: 10.5 X10(3) UL (ref 4–11)

## 2025-03-16 PROCEDURE — 85730 THROMBOPLASTIN TIME PARTIAL: CPT | Performed by: EMERGENCY MEDICINE

## 2025-03-16 PROCEDURE — 82962 GLUCOSE BLOOD TEST: CPT

## 2025-03-16 PROCEDURE — 76376 3D RENDER W/INTRP POSTPROCES: CPT | Performed by: EMERGENCY MEDICINE

## 2025-03-16 PROCEDURE — 85027 COMPLETE CBC AUTOMATED: CPT | Performed by: INTERNAL MEDICINE

## 2025-03-16 PROCEDURE — 85018 HEMOGLOBIN: CPT | Performed by: INTERNAL MEDICINE

## 2025-03-16 PROCEDURE — 83605 ASSAY OF LACTIC ACID: CPT | Performed by: EMERGENCY MEDICINE

## 2025-03-16 PROCEDURE — 85610 PROTHROMBIN TIME: CPT | Performed by: EMERGENCY MEDICINE

## 2025-03-16 PROCEDURE — 74177 CT ABD & PELVIS W/CONTRAST: CPT | Performed by: EMERGENCY MEDICINE

## 2025-03-16 PROCEDURE — 74181 MRI ABDOMEN W/O CONTRAST: CPT | Performed by: EMERGENCY MEDICINE

## 2025-03-16 PROCEDURE — 80053 COMPREHEN METABOLIC PANEL: CPT | Performed by: INTERNAL MEDICINE

## 2025-03-16 PROCEDURE — 85014 HEMATOCRIT: CPT | Performed by: INTERNAL MEDICINE

## 2025-03-16 PROCEDURE — 76705 ECHO EXAM OF ABDOMEN: CPT | Performed by: EMERGENCY MEDICINE

## 2025-03-16 PROCEDURE — 96375 TX/PRO/DX INJ NEW DRUG ADDON: CPT

## 2025-03-16 PROCEDURE — 96361 HYDRATE IV INFUSION ADD-ON: CPT

## 2025-03-16 PROCEDURE — 96365 THER/PROPH/DIAG IV INF INIT: CPT

## 2025-03-16 PROCEDURE — 87040 BLOOD CULTURE FOR BACTERIA: CPT | Performed by: EMERGENCY MEDICINE

## 2025-03-16 PROCEDURE — 96376 TX/PRO/DX INJ SAME DRUG ADON: CPT

## 2025-03-16 PROCEDURE — 96367 TX/PROPH/DG ADDL SEQ IV INF: CPT

## 2025-03-16 PROCEDURE — 84520 ASSAY OF UREA NITROGEN: CPT | Performed by: INTERNAL MEDICINE

## 2025-03-16 RX ORDER — DEXTROSE MONOHYDRATE AND SODIUM CHLORIDE 5; .45 G/100ML; G/100ML
INJECTION, SOLUTION INTRAVENOUS CONTINUOUS
Status: ACTIVE | OUTPATIENT
Start: 2025-03-16 | End: 2025-03-16

## 2025-03-16 RX ORDER — PANTOPRAZOLE SODIUM 40 MG/1
40 TABLET, DELAYED RELEASE ORAL
Status: DISCONTINUED | OUTPATIENT
Start: 2025-03-16 | End: 2025-03-21

## 2025-03-16 RX ORDER — METRONIDAZOLE 500 MG/100ML
500 INJECTION, SOLUTION INTRAVENOUS ONCE
Status: COMPLETED | OUTPATIENT
Start: 2025-03-16 | End: 2025-03-16

## 2025-03-16 RX ORDER — HYDROMORPHONE HYDROCHLORIDE 1 MG/ML
0.5 INJECTION, SOLUTION INTRAMUSCULAR; INTRAVENOUS; SUBCUTANEOUS
Status: DISCONTINUED | OUTPATIENT
Start: 2025-03-16 | End: 2025-03-21

## 2025-03-16 RX ORDER — LISINOPRIL 5 MG/1
5 TABLET ORAL DAILY
Status: DISCONTINUED | OUTPATIENT
Start: 2025-03-16 | End: 2025-03-21

## 2025-03-16 RX ORDER — ONDANSETRON 2 MG/ML
4 INJECTION INTRAMUSCULAR; INTRAVENOUS ONCE
Status: COMPLETED | OUTPATIENT
Start: 2025-03-16 | End: 2025-03-16

## 2025-03-16 RX ORDER — METRONIDAZOLE 500 MG/100ML
500 INJECTION, SOLUTION INTRAVENOUS EVERY 12 HOURS
Status: DISCONTINUED | OUTPATIENT
Start: 2025-03-16 | End: 2025-03-21

## 2025-03-16 RX ORDER — METRONIDAZOLE 500 MG/100ML
500 INJECTION, SOLUTION INTRAVENOUS EVERY 8 HOURS
Status: DISCONTINUED | OUTPATIENT
Start: 2025-03-16 | End: 2025-03-16

## 2025-03-16 RX ORDER — MORPHINE SULFATE 2 MG/ML
2 INJECTION, SOLUTION INTRAMUSCULAR; INTRAVENOUS EVERY 2 HOUR PRN
Status: DISCONTINUED | OUTPATIENT
Start: 2025-03-16 | End: 2025-03-16

## 2025-03-16 RX ORDER — MORPHINE SULFATE 2 MG/ML
2 INJECTION, SOLUTION INTRAMUSCULAR; INTRAVENOUS EVERY 4 HOURS PRN
Status: DISCONTINUED | OUTPATIENT
Start: 2025-03-16 | End: 2025-03-16

## 2025-03-16 RX ORDER — MORPHINE SULFATE 4 MG/ML
4 INJECTION, SOLUTION INTRAMUSCULAR; INTRAVENOUS ONCE
Status: COMPLETED | OUTPATIENT
Start: 2025-03-16 | End: 2025-03-16

## 2025-03-16 RX ORDER — PRAVASTATIN SODIUM 20 MG
20 TABLET ORAL NIGHTLY
Status: DISCONTINUED | OUTPATIENT
Start: 2025-03-16 | End: 2025-03-16

## 2025-03-16 RX ORDER — MORPHINE SULFATE 4 MG/ML
6 INJECTION, SOLUTION INTRAMUSCULAR; INTRAVENOUS ONCE
Status: COMPLETED | OUTPATIENT
Start: 2025-03-16 | End: 2025-03-16

## 2025-03-16 RX ORDER — ASPIRIN 81 MG/1
81 TABLET ORAL DAILY
Status: DISCONTINUED | OUTPATIENT
Start: 2025-03-16 | End: 2025-03-21

## 2025-03-16 RX ORDER — ONDANSETRON 2 MG/ML
4 INJECTION INTRAMUSCULAR; INTRAVENOUS EVERY 6 HOURS PRN
Status: DISCONTINUED | OUTPATIENT
Start: 2025-03-16 | End: 2025-03-21

## 2025-03-16 NOTE — ED INITIAL ASSESSMENT (HPI)
Patient arrives from home via EMS for upper abdominal pain, vomiting that started at 7 pm tonight. Patient was vomiting when EMS arrived, He was given Zofran 4 mg IVP. Patient with known history of gastritis.

## 2025-03-16 NOTE — PLAN OF CARE
Problem: Patient Centered Care  Goal: Patient preferences are identified and integrated in the patient's plan of care  Description: Interventions:- What would you like us to know as we care for you? From home alone- Provide timely, complete, and accurate information to patient/family- Incorporate patient and family knowledge, values, beliefs, and cultural backgrounds into the planning and delivery of care- Encourage patient/family to participate in care and decision-making at the level they choose- Honor patient and family perspectives and choices  Outcome: Progressing     Problem: Diabetes/Glucose Control  Goal: Glucose maintained within prescribed range  Description: INTERVENTIONS:- Monitor Blood Glucose as ordered- Assess for signs and symptoms of hyperglycemia and hypoglycemia- Administer ordered medications to maintain glucose within target range- Assess barriers to adequate nutritional intake and initiate nutrition consult as needed- Instruct patient on self management of diabetes  Outcome: Progressing     Problem: Patient/Family Goals  Goal: Patient/Family Long Term Goal  Description: Patient's Long Term Goal: Discharge from the hospital   Interventions:  - Monitor vital signs  - Monitor appropriate labs  - Monitor blood glucose levels  - Pain management  - Administer medications per order  - Follow MD orders  - Diagnostics per order  - Update / inform patient and family on plan of care  - Discharge planning  - See additional Care Plan goals for specific interventions  Outcome: Progressing  Goal: Patient/Family Short Term Goal  Description: Patient's Short Term Goal: Improve abdominal pain and vomiting   Interventions:   - Monitor vital signs  - Monitor appropriate labs  - Monitor blood glucose levels  - Pain management  - Administer medications per order  - Follow MD orders  - Diagnostics per order  - Update / inform patient and family on plan of care- See additional Care Plan goals for specific  interventions  Outcome: Progressing     Problem: PAIN - ADULT  Goal: Verbalizes/displays adequate comfort level or patient's stated pain goal  Description: INTERVENTIONS:- Encourage pt to monitor pain and request assistance- Assess pain using appropriate pain scale- Administer analgesics based on type and severity of pain and evaluate response- Implement non-pharmacological measures as appropriate and evaluate response- Consider cultural and social influences on pain and pain management- Manage/alleviate anxiety- Utilize distraction and/or relaxation techniques- Monitor for opioid side effects- Notify MD/LIP if interventions unsuccessful or patient reports new pain- Anticipate increased pain with activity and pre-medicate as appropriate  Outcome: Progressing     Problem: SAFETY ADULT - FALL  Goal: Free from fall injury  Description: INTERVENTIONS:- Assess pt frequently for physical needs- Identify cognitive and physical deficits and behaviors that affect risk of falls.- [place] fall precautions as indicated by assessment.- Educate pt/family on patient safety including physical limitations- Instruct pt to call for assistance with activity based on assessment- Modify environment to reduce risk of injury- Provide assistive devices as appropriate- Consider OT/PT consult to assist with strengthening/mobility- Encourage toileting schedule  Outcome: Progressing     Problem: DISCHARGE PLANNING  Goal: Discharge to home or other facility with appropriate resources  Description: INTERVENTIONS:- Identify barriers to discharge w/pt and caregiver- Include patient/family/discharge partner in discharge planning- Arrange for needed discharge resources and transportation as appropriate- Identify discharge learning needs (meds, wound care, etc)- Arrange for interpreters to assist at discharge as needed- Consider post-discharge preferences of patient/family/discharge partner- Complete POLST form as appropriate- Assess patient's  ability to be responsible for managing their own health- Refer to Case Management Department for coordinating discharge planning if the patient needs post-hospital services based on physician/LIP order or complex needs related to functional status, cognitive ability or social support system  Outcome: Progressing     Problem: Altered Communication/Language Barrier  Goal: Patient/Family is able to understand and participate in their care  Description: Interventions:- Assess communication ability and preferred communication style- Implement communication aides and strategies- Use visual cues when possible- Listen attentively, be patient, do not interrupt- Minimize distractions- Allow time for understanding and response- Establish method for patient to ask for assistance (call light)- Provide an  as needed- Communicate barriers and strategies to overcome with those who interact with patient  Outcome: Progressing     Problem: GASTROINTESTINAL - ADULT  Goal: Minimal or absence of nausea and vomiting  Description: INTERVENTIONS:- Maintain adequate hydration with IV or PO as ordered and tolerated- Nasogastric tube to low intermittent suction as ordered- Evaluate effectiveness of ordered antiemetic medications- Provide nonpharmacologic comfort measures as appropriate- Advance diet as tolerated, if ordered- Obtain nutritional consult as needed- Evaluate fluid balance  Outcome: Progressing  Goal: Maintains or returns to baseline bowel function  Description: INTERVENTIONS:- Assess bowel function- Maintain adequate hydration with IV or PO as ordered and tolerated- Evaluate effectiveness of GI medications- Encourage mobilization and activity- Obtain nutritional consult as needed- Establish a toileting routine/schedule- Consider collaborating with pharmacy to review patient's medication profile  Outcome: Progressing     Problem: SKIN/TISSUE INTEGRITY - ADULT  Goal: Skin integrity remains intact  Description:  INTERVENTIONS- Assess and document risk factors for pressure ulcer development- Assess and document skin integrity- Monitor for areas of redness and/or skin breakdown- Initiate interventions, skin care algorithm/standards of care as needed  Outcome: Progressing

## 2025-03-16 NOTE — ED QUICK NOTES
Orders for admission, patient is aware of plan and ready to go upstairs. Any questions, please call [facility] at extension [phone].     Patient Covid vaccination status: Fully vaccinated     COVID Test Ordered in ED: None    COVID Suspicion at Admission: N/A    Running Infusions:      Mental Status/LOC at time of transport: A and O x 4    Other pertinent information: NPO, oxygen requirement after receiving Morphine  CIWA score: N/A   NIH score:  N/A

## 2025-03-16 NOTE — H&P
[facility]  part of [facility]    History and Physical    Francisco Del Rio Patient Status:  Inpatient    10/24/1945 MRN [id number]   Location [facility] 5SW/SE Attending Omayra Henao MD   Hosp Day # 0 PCP IRIS BOLAÑOS MD     Date:  3/16/2025  Date of Admission:  3/15/2025    History provided by:patient and son  HPI:     Chief Complaint   Patient presents with    Abdomen/Flank Pain    Nausea/Vomiting/Diarrhea     79 year old male with PMH of HTN, HLD, DM presents to ER with abdominal pain and vomiting. Patient has elevated Lipase and US GB revealed GB wall thickening C/W acute cholecystitis        History     Past Medical History:    Diabetes (HCC)    Esophageal reflux    Gastritis    High blood pressure    High cholesterol    Other and unspecified hyperlipidemia     History reviewed. No pertinent surgical history.  Family History   Problem Relation Age of Onset    Lipids Mother      Social History:  Social History     Socioeconomic History    Marital status:    Tobacco Use    Smoking status: Some Days     Current packs/day: 0.50     Average packs/day: 0.5 packs/day for 50.0 years (25.0 ttl pk-yrs)     Types: Cigarettes    Smokeless tobacco: Never   Vaping Use    Vaping status: Never Used   Substance and Sexual Activity    Alcohol use: Not Currently     Alcohol/week: 8.3 standard drinks of alcohol     Types: 10 Cans of beer per week    Drug use: No   Other Topics Concern    Caffeine Concern Yes     Comment: Coffee, soda      Social Drivers of Health     Food Insecurity: No Food Insecurity (3/16/2025)    NCSS - Food Insecurity     Worried About Running Out of Food in the Last Year: No     Ran Out of Food in the Last Year: No   Transportation Needs: No Transportation Needs (3/16/2025)    NCSS - Transportation     Lack of Transportation: No   Housing Stability: Not At Risk (3/16/2025)    NCSS - Housing/Utilities     Has Housing: Yes     Worried About Losing Housing: No      Unable to Get Utilities: No     Allergies/Medications:   Allergies: Allergies[1]  Medications Prior to Admission   Medication Sig    simvastatin 40 MG Oral Tab Take 1 tablet (40 mg total) by mouth nightly.    Pantoprazole Sodium 40 MG Oral Tab EC Take 1 tablet (40 mg total) by mouth once daily.    lisinopril 10 MG Oral Tab Take 0.5 tablets (5 mg total) by mouth daily.    MetFORMIN HCl 500 MG Oral Tab 1/2 tab PO q12 hrs. Pre meals. (Patient taking differently: Take 1 tablet (500 mg total) by mouth 2 (two) times daily with meals.)    Glucose Blood In Vitro Strip Checks QAM Dx. 250.02. One touch ultra.    Glucose Blood (FREESTYLE LITE TEST) In Vitro Strip Dx. 250.02. take by Misc.(Non-Drug; Combo Route) route twice a day    FreeStyle Lancets Does not apply Misc as directed    Blood Glucose Monitoring Suppl (FREESTYLE LITE) Does not apply Device as directed    aspirin 81 MG Oral Tab Take 1 tablet by mouth daily.       Review of Systems:     Constitutional: Negative.    HENT: Negative.     Eyes: Negative.    Respiratory: Negative.     Gastrointestinal:  Positive for vomiting and abdominal pain.   Endocrine: Negative.    Genitourinary: Negative.    Musculoskeletal: Negative.    Allergic/Immunologic: Negative.    Neurological: Negative.    Hematological: Negative.    Psychiatric/Behavioral: Negative.         Physical Exam:   Vital Signs:  Blood pressure 146/74, pulse 99, temperature 98.9 °F (37.2 °C), temperature source Oral, resp. rate 20, height 5' 6\" (1.676 m), weight 179 lb 3.2 oz (81.3 kg), SpO2 96%.  Physical Exam  Vitals and nursing note reviewed.   Constitutional:       Appearance: Normal appearance.   HENT:      Head: Normocephalic and atraumatic.   Eyes:      Pupils: Pupils are equal, round, and reactive to light.   Cardiovascular:      Rate and Rhythm: Normal rate and regular rhythm.      Pulses: Normal pulses.      Heart sounds: Normal heart sounds.   Pulmonary:      Effort: Pulmonary effort is normal.       Breath sounds: Normal breath sounds.   Abdominal:      General: Abdomen is flat. Bowel sounds are normal.      Palpations: Abdomen is soft.      Tenderness: There is abdominal tenderness.   Musculoskeletal:      Cervical back: Neck supple.   Skin:     General: Skin is warm.   Neurological:      General: No focal deficit present.      Mental Status: He is alert and oriented to person, place, and time.   Psychiatric:         Mood and Affect: Mood normal.           Results:     Lab Results   Component Value Date    WBC 10.5 03/16/2025    HGB 14.4 03/16/2025    HCT 44.7 03/16/2025    .0 03/16/2025    CREATSERUM 1.48 (H) 03/16/2025    BUN 19 03/16/2025     03/16/2025    K 4.5 03/16/2025     03/16/2025    CO2 24.0 03/16/2025     (H) 03/16/2025    CA 8.7 03/16/2025    ALB 4.3 03/16/2025    ALKPHO 192 (H) 03/16/2025    BILT 0.6 03/16/2025    TP 6.9 03/16/2025     (H) 03/16/2025     (H) 03/16/2025    PTT 22.8 (L) 03/16/2025    INR 0.96 03/16/2025    LIP >3,500 (H) 03/15/2025    TROPHS 5 03/15/2025     MRI ABDOMEN AND MRCP W/3D (CPT=74181/32121)    Result Date: 3/16/2025  CONCLUSION:   1. No gallstones.  No choledocholithiasis.  Gallbladder wall thickening and edema is probably reactive to adjacent pancreatitis, but acute cholecystitis is not excluded.  2. Severe acute interstitial pancreatitis.  No discrete peripancreatic fluid collections are seen.  Due to lack of intravenous contrast, the pancreas is not well evaluated for necrosis.  3. Small right pleural effusion.    elm-remote    Dictated by (CST): Emilio Pretty MD on 3/16/2025 at 8:56 AM     Finalized by (CST): Emilio Pretty MD on 3/16/2025 at 9:01 AM          US GALLBLADDER (CPT=76705)    Result Date: 3/16/2025  CONCLUSION:   Gallbladder mild distension and wall thickening/edema may indicate acute cholecystitis.  Note no gallstones are identified.  No biliary ductal dilatation.  Suboptimal evaluation of the portal vein is  negative for portal venous thrombus.      Preliminary report was given by [facility].  There are no clinically significant discrepancies.    elm-remote     Dictated by (CST): Emilio Pretty MD on 3/16/2025 at 7:22 AM     Finalized by (CST): Emilio Pretty MD on 3/16/2025 at 7:25 AM         EKG    Result Date: 3/16/2025  Sinus rhythm with 2nd degree A-V block (Mobitz I) Left axis deviation Low voltage QRS, consider pulmonary disease, pericardial effusion, or normal variant Nonspecific ST and T wave abnormality Abnormal ECG When compared with ECG of 25-AUG-2023 14:31, Sinus rhythm is now with 2nd degree A-V block (Mobitz I)     Assessment/Plan:     Acute pancreatitis, unspecified complication status, unspecified pancreatitis type (HCC)  NPO, IV fluids , GI evaluation    Acute cholecystitis  NPO, IV fluid surgery evaluation  HTN BP stable  HLD on statin  DM monitor   On Rocephin and Flagyl  D/W son at bedside  D/W RN    MDM  Reviewed previous: labs and CT scan  Total time providing critical care:  minutes. This excludes time spent performing separately reportable procedures and services.  Consults: primary care provider, general surgery and gastrointestinal           PHYSICIAN Certification of Need for Inpatient Hospitalization - Initial Certification    Patient will require inpatient services that will reasonably be expected to span two midnight's based on the clinical documentation in H+P.   Based on patients current state of illness, I anticipate that, after discharge, patient will require TBD.        HERMAN COOK, MD YORDY  3/16/2025         [1] No Known Allergies

## 2025-03-16 NOTE — PROGRESS NOTES
[facility] Pharmacy Dosing Service  Antibiotic Dosing    Francisco Del Rio is a 79 year old for whom pharmacy was consulted to dose ceftriaxone (ROCEPHIN) and metronidazole (FLAGYL) for treatment of intra-abdominal infection.    CrCl: estimated creatinine clearance is 34.1 mL/min (A) (based on SCr of 1.56 mg/dL (H)).    Actual Body Weight:   Wt Readings from Last 1 Encounters:   03/16/25 81.3 kg (179 lb 3.2 oz)    Ideal body weight: 63.8 kg (140 lb 10.5 oz)  Adjusted ideal body weight: 70.8 kg (156 lb 1.2 oz)   Body mass index is 28.92 kg/m².    ceftriaxone (ROCEPHIN) and metronidazole (FLAGYL) has been ordered per P&T approved protocol.    Thank you,   Ki Mccain, PharmD  3/16/2025  6:01 AM

## 2025-03-16 NOTE — PLAN OF CARE
Problem: Patient Centered Care  Goal: Patient preferences are identified and integrated in the patient's plan of care  Description: Interventions:- What would you like us to know as we care for you? From home alone- Provide timely, complete, and accurate information to patient/family- Incorporate patient and family knowledge, values, beliefs, and cultural backgrounds into the planning and delivery of care- Encourage patient/family to participate in care and decision-making at the level they choose- Honor patient and family perspectives and choices  Outcome: Progressing     Problem: Diabetes/Glucose Control  Goal: Glucose maintained within prescribed range  Description: INTERVENTIONS:- Monitor Blood Glucose as ordered- Assess for signs and symptoms of hyperglycemia and hypoglycemia- Administer ordered medications to maintain glucose within target range- Assess barriers to adequate nutritional intake and initiate nutrition consult as needed- Instruct patient on self management of diabetes  Outcome: Progressing     Problem: Patient/Family Goals  Goal: Patient/Family Long Term Goal  Description: Patient's Long Term Goal: Discharge from the hospital   Interventions:  - Monitor vital signs  - Monitor appropriate labs  - Monitor blood glucose levels  - Pain management  - Administer medications per order  - Follow MD orders  - Diagnostics per order  - Update / inform patient and family on plan of care  - Discharge planning  - See additional Care Plan goals for specific interventions  Outcome: Progressing  Goal: Patient/Family Short Term Goal  Description: Patient's Short Term Goal: Improve abdominal pain and vomiting   Interventions:   - Monitor vital signs  - Monitor appropriate labs  - Monitor blood glucose levels  - Pain management  - Administer medications per order  - Follow MD orders  - Diagnostics per order  - Update / inform patient and family on plan of care- See additional Care Plan goals for specific  interventions  Outcome: Progressing     Problem: PAIN - ADULT  Goal: Verbalizes/displays adequate comfort level or patient's stated pain goal  Description: INTERVENTIONS:- Encourage pt to monitor pain and request assistance- Assess pain using appropriate pain scale- Administer analgesics based on type and severity of pain and evaluate response- Implement non-pharmacological measures as appropriate and evaluate response- Consider cultural and social influences on pain and pain management- Manage/alleviate anxiety- Utilize distraction and/or relaxation techniques- Monitor for opioid side effects- Notify MD/LIP if interventions unsuccessful or patient reports new pain- Anticipate increased pain with activity and pre-medicate as appropriate  Outcome: Progressing     Problem: SAFETY ADULT - FALL  Goal: Free from fall injury  Description: INTERVENTIONS:- Assess pt frequently for physical needs- Identify cognitive and physical deficits and behaviors that affect risk of falls.- [place] fall precautions as indicated by assessment.- Educate pt/family on patient safety including physical limitations- Instruct pt to call for assistance with activity based on assessment- Modify environment to reduce risk of injury- Provide assistive devices as appropriate- Consider OT/PT consult to assist with strengthening/mobility- Encourage toileting schedule  Outcome: Progressing     Problem: DISCHARGE PLANNING  Goal: Discharge to home or other facility with appropriate resources  Description: INTERVENTIONS:- Identify barriers to discharge w/pt and caregiver- Include patient/family/discharge partner in discharge planning- Arrange for needed discharge resources and transportation as appropriate- Identify discharge learning needs (meds, wound care, etc)- Arrange for interpreters to assist at discharge as needed- Consider post-discharge preferences of patient/family/discharge partner- Complete POLST form as appropriate- Assess patient's  ability to be responsible for managing their own health- Refer to Case Management Department for coordinating discharge planning if the patient needs post-hospital services based on physician/LIP order or complex needs related to functional status, cognitive ability or social support system  Outcome: Progressing     Problem: Altered Communication/Language Barrier  Goal: Patient/Family is able to understand and participate in their care  Description: Interventions:- Assess communication ability and preferred communication style- Implement communication aides and strategies- Use visual cues when possible- Listen attentively, be patient, do not interrupt- Minimize distractions- Allow time for understanding and response- Establish method for patient to ask for assistance (call light)- Provide an  as needed- Communicate barriers and strategies to overcome with those who interact with patient  Outcome: Progressing     Problem: GASTROINTESTINAL - ADULT  Goal: Minimal or absence of nausea and vomiting  Description: INTERVENTIONS:- Maintain adequate hydration with IV or PO as ordered and tolerated- Nasogastric tube to low intermittent suction as ordered- Evaluate effectiveness of ordered antiemetic medications- Provide nonpharmacologic comfort measures as appropriate- Advance diet as tolerated, if ordered- Obtain nutritional consult as needed- Evaluate fluid balance  Outcome: Progressing  Goal: Maintains or returns to baseline bowel function  Description: INTERVENTIONS:- Assess bowel function- Maintain adequate hydration with IV or PO as ordered and tolerated- Evaluate effectiveness of GI medications- Encourage mobilization and activity- Obtain nutritional consult as needed- Establish a toileting routine/schedule- Consider collaborating with pharmacy to review patient's medication profile  Outcome: Progressing     Problem: SKIN/TISSUE INTEGRITY - ADULT  Goal: Skin integrity remains intact  Description:  INTERVENTIONS- Assess and document risk factors for pressure ulcer development- Assess and document skin integrity- Monitor for areas of redness and/or skin breakdown- Initiate interventions, skin care algorithm/standards of care as needed  Outcome: Progressing      Monitoring vital signs- see flowsheets. Monitoring blood glucose levels. Pain medication provided as needed. No acute changes noted at this moment. Safety and fall precautions maintained- bed alarm on, bed locked in lowest position, call light within reach. Frequent rounding by nursing staff.

## 2025-03-16 NOTE — CONSULTS
[facility]  part of [facility]    Report of Consultation    Francisco Del Rio Patient Status:  Inpatient    10/24/1945 MRN [id number]   Location [facility] 5SW/SE Attending Omayra Henao MD   Hosp Day # 0 PCP IRIS BOLAÑOS MD     Date of Admission:  3/15/2025  Reason for Consultation:   Gallstone pancreatitis    History of Present Illness:   Patient is a 79 year old male with hx of DM, htn, hlp, gerd who was admitted to the hospital for Acute pancreatitis, unspecified complication status, unspecified pancreatitis type (HCC) with likely recent but resolved choledocolithiasis.  Lactate elevated at 3.4 this am with leukocytosis at admission, persistent hyperglycema; normal inr, normal tbili and hbg on admission.  Interview with both patient and son.    Ruq c/w cholecystitis with normal cbd and liver; mri/mrcp showed severe pancreatitis but unable to assess for necrosis; normal ducts/no stones    Past Medical History  Past Medical History:    Diabetes (HCC)    Esophageal reflux    Gastritis    High blood pressure    High cholesterol    Other and unspecified hyperlipidemia       Past Surgical History  History reviewed. No pertinent surgical history.    Family History  Family History   Problem Relation Age of Onset    Lipids Mother      Social History  Social History     Socioeconomic History    Marital status:    Tobacco Use    Smoking status: Some Days     Current packs/day: 0.50     Average packs/day: 0.5 packs/day for 50.0 years (25.0 ttl pk-yrs)     Types: Cigarettes    Smokeless tobacco: Never   Vaping Use    Vaping status: Never Used   Substance and Sexual Activity    Alcohol use: Not Currently     Alcohol/week: 8.3 standard drinks of alcohol     Types: 10 Cans of beer per week    Drug use: No   Other Topics Concern    Caffeine Concern Yes     Comment: Coffee, soda      Social Drivers of Health     Food Insecurity: No Food Insecurity (3/16/2025)    NCSS - Food Insecurity      Worried About Running Out of Food in the Last Year: No     Ran Out of Food in the Last Year: No   Transportation Needs: No Transportation Needs (3/16/2025)    NCSS - Transportation     Lack of Transportation: No   Housing Stability: Not At Risk (3/16/2025)    NCSS - Housing/Utilities     Has Housing: Yes     Worried About Losing Housing: No     Unable to Get Utilities: No          Current Medications:  Current Facility-Administered Medications   Medication Dose Route Frequency    ondansetron (Zofran) 4 MG/2ML injection 4 mg  4 mg Intravenous Q6H PRN    dextrose 5%-sodium chloride 0.45% infusion   Intravenous Continuous    [START ON 3/17/2025] cefTRIAXone (Rocephin) 2 g in sodium chloride 0.9% 100 mL IVPB-ADDV  2 g Intravenous Q24H    metroNIDAZOLE in sodium chloride 0.79% (Flagyl) 5 mg/mL IVPB premix 500 mg  500 mg Intravenous Q12H    aspirin DR tab 81 mg  81 mg Oral Daily    lisinopril (Prinivil; Zestril) tab 5 mg  5 mg Oral Daily    pantoprazole (Protonix) DR tab 40 mg  40 mg Oral Daily with breakfast    lactated ringers IV bolus 500 mL  500 mL Intravenous Once    morphINE PF 2 MG/ML injection 2 mg  2 mg Intravenous Q2H PRN    insulin aspart (NovoLOG) 100 Units/mL FlexPen 1-5 Units  1-5 Units Subcutaneous TID CC     Medications Prior to Admission   Medication Sig    simvastatin 40 MG Oral Tab Take 1 tablet (40 mg total) by mouth nightly.    Pantoprazole Sodium 40 MG Oral Tab EC Take 1 tablet (40 mg total) by mouth once daily.    lisinopril 10 MG Oral Tab Take 0.5 tablets (5 mg total) by mouth daily.    MetFORMIN HCl 500 MG Oral Tab 1/2 tab PO q12 hrs. Pre meals. (Patient taking differently: Take 1 tablet (500 mg total) by mouth 2 (two) times daily with meals.)    Glucose Blood In Vitro Strip Checks QAM Dx. 250.02. One touch ultra.    Glucose Blood (FREESTYLE LITE TEST) In Vitro Strip Dx. 250.02. take by Misc.(Non-Drug; Combo Route) route twice a day    FreeStyle Lancets Does not apply Misc as directed    Blood  Glucose Monitoring Suppl (FREESTYLE LITE) Does not apply Device as directed    aspirin 81 MG Oral Tab Take 1 tablet by mouth daily.       Allergies  Allergies[1]    Review of Systems:      ROS:  No fevers, chills, night sweats. No weight loss nor weight gain.  +nausea, vomiting.  Denies diarrhea but no bms in two days, no persistent changes in caliber of stool, no dysphagia, no jaundice, no heartburn, no early satiety, no hematemesis, no melena. +epigastric abdominal pain.  Denies hematochezia.    History of obstructive sleep apnea: No  History of respiratory illness/home oxygen supplementation: No  History of cardiac illness/pacemaker/AICD/blood thinners: No  History of anxiety/depression: No  History of chronic narcotic pain medication use: No  History of diabetes: YES  History of mobility issues: No  History of C-Diff Colitis:No  History of MRSA: No  History of abdominal surgeries:  none    ENDOSCOPIC REPORTS:   Colonoscopy:over 5 years ago  EGD: none  ERCP:    History of Prep or Sedation intolerance with previous endoscopic procedures: None    GENERAL: feels well otherwise.  SKIN: denies any unusual skin lesions  EYES: denies any new visual changes or double vision, denies red painful eyes  HEENT: denies any new hearing changes, nasal congestion, sinus pain  LUNGS: denies shortness of breath with exertion or persistent cough  CARDIOVASCULAR: denies chest pain on exertion  GI: as above  : denies dysuria, nocturia or changes in stream  MUSCULOSKELETAL: denies new myalgias, swelling  NEURO: denies new sensory or motor deficits.  PSYCHE: denies depression or anxiety  HEMATOLOGIC: denies bleeding or bruising    Physical Exam:   Blood pressure 146/74, pulse 99, temperature 98.9 °F (37.2 °C), temperature source Oral, resp. rate 20, height 5' 6\" (1.676 m), weight 179 lb 3.2 oz (81.3 kg), SpO2 96%.    General appearance:  alert, appears stated age and cooperative    HEENT: negative findings: lids and lashes normal and  conjunctivae and sclerae normal.     Pulmonary: clear to auscultation bilaterally of anterior chest    Cardiovascular: regular rate and rhythm    Abdominal: soft, bowel sounds present; +distended, +tender to palp in epigastric region w/o rebound nor guarding    Extremities: No edema, cyanosis, or clubbing    Skin: warm and dry    Neurologic: Grossly normal    Psychiatric: calm      Results:     Laboratory Data:  Lab Results   Component Value Date    WBC 10.5 03/16/2025    HGB 14.4 03/16/2025    HCT 44.7 03/16/2025    .0 03/16/2025    CREATSERUM 1.48 (H) 03/16/2025    BUN 19 03/16/2025     03/16/2025    K 4.5 03/16/2025     03/16/2025    CO2 24.0 03/16/2025     (H) 03/16/2025    CA 8.7 03/16/2025    ALB 4.3 03/16/2025    ALKPHO 192 (H) 03/16/2025    TP 6.9 03/16/2025     (H) 03/16/2025     (H) 03/16/2025    PTT 22.8 (L) 03/16/2025    INR 0.96 03/16/2025    PTP 13.4 03/16/2025    LIP >3,500 (H) 03/15/2025    TROPHS 5 03/15/2025         Imaging:  MRI ABDOMEN AND MRCP W/3D (CPT=74181/08141)    Result Date: 3/16/2025  CONCLUSION:   1. No gallstones.  No choledocholithiasis.  Gallbladder wall thickening and edema is probably reactive to adjacent pancreatitis, but acute cholecystitis is not excluded.  2. Severe acute interstitial pancreatitis.  No discrete peripancreatic fluid collections are seen.  Due to lack of intravenous contrast, the pancreas is not well evaluated for necrosis.  3. Small right pleural effusion.    elm-remote    Dictated by (CST): Emilio Pretty MD on 3/16/2025 at 8:56 AM     Finalized by (CST): Emilio Pretty MD on 3/16/2025 at 9:01 AM          US GALLBLADDER (CPT=76705)    Result Date: 3/16/2025  CONCLUSION:   Gallbladder mild distension and wall thickening/edema may indicate acute cholecystitis.  Note no gallstones are identified.  No biliary ductal dilatation.  Suboptimal evaluation of the portal vein is negative for portal venous thrombus.      Preliminary  report was given by [facility].  There are no clinically significant discrepancies.    elm-remote     Dictated by (CST): Emilio Pretty MD on 3/16/2025 at 7:22 AM     Finalized by (CST): Emilio Pretty MD on 3/16/2025 at 7:25 AM              Impression:   1)Acute pancreatitis, unspecified complication status, unspecified pancreatitis type (HCC) - ddx includes gallstone related andre in setting of elevated laes plus cholecystitis but includes etoh and idiopathic and at this age, malignancy   -mri/mrcp has r/o choledocho but due to lack of contrast, unable to assess for masses or necrosis; outpatient imaging to r/o mass after resolution; CT reassuring for lack of necrosis   -IV hydration: LR bolus of 20 mL/kg then 3 mL/kg/hour for 12 hours (81 kg)     -trend bun/cr and hbg to ensure adequate resuscitation/treatment       2)Acute cholecystitis without choledocholithiasis  -iv antibiotics  -surgery consult    3)Elevated lactate with leukocytosis - ddx including acute cholecystitis and likely acute pancreatitis   -trend lactate  -blood cultures ordered  -r/o other organs:  cxr, ua, covid, respiration(defer to primary)    4)elevated ast/alt/alk phos - acute elevated in setting as noted above (normal in 11/2024); ast> alt with alk phos > alt supporting cholestatic pattern and concern for etoh related  -trend but if ast/alk phos remain elevated, consider non liver pathology     5)CKD - baseline 1.2-1.5   -continue to trend    Thank you for allowing me to participate in the care of your patient.    Time spent in direct patient contact and decision making as well as counseling/coordination of care:  80 minutes    Avani Schneider MD  3/16/2025         [1] No Known Allergies

## 2025-03-16 NOTE — ED PROVIDER NOTES
Patient Seen in: [facility] Emergency Department    History     Chief Complaint   Patient presents with    Abdomen/Flank Pain    Nausea/Vomiting/Diarrhea       HPI    79-year-old male who presents to the emergency department given several hours of generalized abdominal pain mostly in the periumbilical region associated with nausea and nonbloody nonbilious emesis.  No chest pain or dyspnea.  No fevers    History reviewed.   Past Medical History:    Diabetes (HCC)    Esophageal reflux    Gastritis    High blood pressure    High cholesterol    Other and unspecified hyperlipidemia       History reviewed. History reviewed. No pertinent surgical history.      Medications :  Prescriptions Prior to Admission[1]     Family History   Problem Relation Age of Onset    Lipids Mother        Smoking Status:   Social History     Socioeconomic History    Marital status:    Tobacco Use    Smoking status: Some Days     Current packs/day: 0.50     Average packs/day: 0.5 packs/day for 50.0 years (25.0 ttl pk-yrs)     Types: Cigarettes    Smokeless tobacco: Never   Vaping Use    Vaping status: Never Used   Substance and Sexual Activity    Alcohol use: Not Currently     Alcohol/week: 8.3 standard drinks of alcohol     Types: 10 Cans of beer per week    Drug use: No   Other Topics Concern    Caffeine Concern Yes     Comment: Coffee, soda        Constitutional and vital signs reviewed.      Social History and Family History elements reviewed from today, pertinent positives to the presenting problem noted.    Physical Exam     ED Triage Vitals   BP 03/15/25 2307 154/70   Pulse 03/15/25 2307 74   Resp 03/15/25 2307 22   Temp 03/15/25 2346 97.6 °F (36.4 °C)   Temp src 03/15/25 2346 Temporal   SpO2 03/15/25 2307 97 %   O2 Device 03/15/25 2307 None (Room air)       All measures to prevent infection transmission during my interaction with the patient were taken. The patient was already wearing a droplet mask on my arrival to the  room. Personal protective equipment was worn throughout the duration of the exam.  Handwashing was performed prior to and after the exam.  Stethoscope and any equipment used during my examination was cleaned with super sani-cloth germicidal wipes following the exam.     Physical Exam    General: mild distress  Head: Normocephalic and atraumatic.  Mouth/Throat/Ears/Nose: Oropharynx is clear    Eyes: Conjunctivae and EOM are normal.   Neck: Normal range of motion. Supple.   Cardiovascular: Normal rate, regular rhythm, normal heart sounds.  Respiratory/Chest: Clear and equal bilaterally. Exhibits no tenderness.  Gastrointestinal: Soft, periumbilical-tenderness, non-distended. Bowel sounds are normal.   Musculoskeletal:No swelling or deformity.   Neurological: Alert and appropriate. No focal deficits.   Skin: Skin is warm and dry. No pallor.  Psychiatric: Has a normal mood and affect.      ED Course        Labs Reviewed   COMP METABOLIC PANEL (14) - Abnormal; Notable for the following components:       Result Value    Glucose 284 (*)     Creatinine 1.56 (*)     Calculated Osmolality 304 (*)     eGFR-Cr 45 (*)     ALT 92 (*)      (*)     Alkaline Phosphatase 197 (*)     All other components within normal limits   CBC WITH DIFFERENTIAL WITH PLATELET - Abnormal; Notable for the following components:    WBC 19.8 (*)     Neutrophil Absolute Prelim 15.81 (*)     Neutrophil Absolute 15.81 (*)     Monocyte Absolute 1.15 (*)     All other components within normal limits   LIPASE - Abnormal; Notable for the following components:    Lipase >3,500 (*)     All other components within normal limits   PTT, ACTIVATED - Abnormal; Notable for the following components:    PTT 22.8 (*)     All other components within normal limits   LACTIC ACID, PLASMA - Abnormal; Notable for the following components:    Lactic Acid 3.8 (*)     All other components within normal limits   LACTIC ACID REFLEX POST POSTIVE - Abnormal; Notable for the  following components:    Lactic Acid 3.4 (*)     All other components within normal limits   POCT GLUCOSE - Abnormal; Notable for the following components:    POC Glucose  165 (*)     All other components within normal limits   POCT GLUCOSE - Abnormal; Notable for the following components:    POC Glucose  174 (*)     All other components within normal limits   TROPONIN I HIGH SENSITIVITY - Normal   TRIGLYCERIDES - Normal   PROTHROMBIN TIME (PT) - Normal   HEMOGLOBIN A1C   LACTIC ACID REFLEX POST POSITIVE [id number]   RAINBOW DRAW LIGHT GREEN   RAINBOW DRAW LAVENDER   BLOOD CULTURE   BLOOD CULTURE     EKG     Rate, intervals and axes as noted on EKG Report.  Rate: 59  Rhythm: Sinus Rhythm  Reading: Sinus bradycardia rate 59, normal axis, PVC noted.  No STEMI.           As Interpreted by me    Imaging Results Available and Reviewed while in ED: No results found.  ED Medications Administered:   Medications   morphINE PF 2 MG/ML injection 2 mg (2 mg Intravenous Given 3/16/25 0538)   ondansetron (Zofran) 4 MG/2ML injection 4 mg (has no administration in time range)   dextrose 5%-sodium chloride 0.45% infusion ( Intravenous New Bag 3/16/25 0538)   cefTRIAXone (Rocephin) 2 g in sodium chloride 0.9% 100 mL IVPB-ADDV (has no administration in time range)   metroNIDAZOLE in sodium chloride 0.79% (Flagyl) 5 mg/mL IVPB premix 500 mg (has no administration in time range)   alum-mag hydroxide-simethicone (Maalox) 200-200-20 MG/5ML oral suspension 30 mL (30 mL Oral Given 3/15/25 2337)   famotidine (Pepcid) 20 mg/2mL injection 20 mg (20 mg Intravenous Given 3/15/25 2332)   ondansetron (Zofran) 4 MG/2ML injection 4 mg (4 mg Intravenous Given 3/15/25 2335)   iopamidol 76% (ISOVUE-370) injection for power injector (75 mL Intravenous Given 3/16/25 0014)   sodium chloride 0.9 % IV bolus 1,000 mL (0 mL Intravenous Stopped 3/16/25 0142)   morphINE PF 4 MG/ML injection 6 mg (6 mg Intravenous Given 3/16/25 0048)   cefTRIAXone (Rocephin) 2 g in  sodium chloride 0.9% 100 mL IVPB-ADDV (0 g Intravenous Stopped 3/16/25 0320)   metroNIDAZOLE in sodium chloride 0.79% (Flagyl) 5 mg/mL IVPB premix 500 mg (500 mg Intravenous Handoff 3/16/25 0338)   morphINE PF 4 MG/ML injection 4 mg (4 mg Intravenous Given 3/16/25 0348)   ondansetron (Zofran) 4 MG/2ML injection 4 mg (4 mg Intravenous Given 3/16/25 0348)         MDM     Vitals:    03/16/25 0351 03/16/25 0407 03/16/25 0408 03/16/25 0538   BP: 150/71  (!) 157/91 155/88   BP Location:   Left arm Right arm   Pulse: 95  92 91   Resp: 25 22    Temp:   98.6 °F (37 °C)    TempSrc:   Oral    SpO2: 98%  96% 94%   Weight:  81.3 kg     Height:  167.6 cm (5' 6\")       *I personally reviewed and interpreted all ED vitals.    Pulse Ox: 98%, Room air, Normal     Monitor Interpretation:   normal sinus rhythm as interpreted by me.  The cardiac monitor was ordered concern for electrolyte derangements.      Medical Decision Making      Differential Diagnosis/ Diagnostic Considerations: Pancreatitis, bowel obstruction, volvulus    Complicating Factors: The patient already has  to contribute to the complexity of this ED evaluation.    I reviewed prior chart records including ED note from August 25, 2023.  Patient here with pancreatitis on my interpretation of the lab work, mild elevation in transaminases and aside from pancreatitis seen on CT scan on my interpretation, there is also signs of cholecystitis.  Right upper quadrant ultrasound also with signs of cholecystitis, acalculous.  I am concern for gallstone pancreatitis.  Discussed with Dr. Dr. Avani Schneider. MRCP ordered. Admitted to Dr. Henao.     Admitted In stable condition.  Patient is comfortable with the plan.     I spent 30 minutes of critical care time caring for this patient. This does not include time spent on separately reported billable procedures.         Disposition and Plan     Clinical Impression:  1. Acute pancreatitis, unspecified complication status, unspecified  pancreatitis type (HCC)    2. Acute cholecystitis        Disposition:  Admit    Follow-up:  No follow-up provider specified.    Medications Prescribed:  Current Discharge Medication List          Hospital Problems       Present on Admission  Date Reviewed: 9/1/2016            ICD-10-CM Noted POA    * (Principal) Acute pancreatitis, unspecified complication status, unspecified pancreatitis type (HCC) K85.90 3/16/2025 Unknown    Acute cholecystitis K81.0 3/16/2025 Unknown    Pancreatitis (HCC) K85.90 3/16/2025 Unknown                       [1]   Medications Prior to Admission   Medication Sig Dispense Refill Last Dose/Taking    simvastatin 40 MG Oral Tab Take 1 tablet (40 mg total) by mouth nightly. 30 tablet 3 3/14/2025    Pantoprazole Sodium 40 MG Oral Tab EC Take 1 tablet (40 mg total) by mouth once daily. 90 tablet 1 3/15/2025 Morning    lisinopril 10 MG Oral Tab Take 0.5 tablets (5 mg total) by mouth daily. 90 tablet 1 3/15/2025 Noon    MetFORMIN HCl 500 MG Oral Tab 1/2 tab PO q12 hrs. Pre meals. (Patient taking differently: Take 1 tablet (500 mg total) by mouth 2 (two) times daily with meals.) 90 tablet 1 3/15/2025 Morning    Glucose Blood In Vitro Strip Checks QAM Dx. 250.02. One touch ultra. 100 strip 6 Taking    Glucose Blood (FREESTYLE LITE TEST) In Vitro Strip Dx. 250.02. take by Misc.(Non-Drug; Combo Route) route twice a day 100 strip 6 Taking    FreeStyle Lancets Does not apply Misc as directed   Taking    Blood Glucose Monitoring Suppl (FREESTYLE LITE) Does not apply Device as directed   Taking    aspirin 81 MG Oral Tab Take 1 tablet by mouth daily. 90 tablet 1 3/14/2025

## 2025-03-17 ENCOUNTER — APPOINTMENT (OUTPATIENT)
Dept: NUCLEAR MEDICINE | Facility: HOSPITAL | Age: 80
End: 2025-03-17
Attending: SPECIALIST
Payer: MEDICARE

## 2025-03-17 LAB
ALBUMIN SERPL-MCNC: 3.9 G/DL (ref 3.2–4.8)
ALBUMIN/GLOB SERPL: 1.5 {RATIO} (ref 1–2)
ALP LIVER SERPL-CCNC: 136 U/L
ALT SERPL-CCNC: 93 U/L
AMYLASE SERPL-CCNC: 724 U/L (ref 30–118)
ANION GAP SERPL CALC-SCNC: 10 MMOL/L (ref 0–18)
AST SERPL-CCNC: 83 U/L (ref ?–34)
BASOPHILS # BLD: 0 X10(3) UL (ref 0–0.2)
BASOPHILS NFR BLD: 0 %
BILIRUB SERPL-MCNC: 0.8 MG/DL (ref 0.2–1.1)
BUN BLD-MCNC: 19 MG/DL (ref 9–23)
BUN BLD-MCNC: 20 MG/DL (ref 9–23)
BUN BLD-MCNC: 22 MG/DL (ref 9–23)
BUN BLD-MCNC: 22 MG/DL (ref 9–23)
BUN/CREAT SERPL: 16.3 (ref 10–20)
CALCIUM BLD-MCNC: 8.6 MG/DL (ref 8.7–10.4)
CHLORIDE SERPL-SCNC: 106 MMOL/L (ref 98–112)
CO2 SERPL-SCNC: 26 MMOL/L (ref 21–32)
CREAT BLD-MCNC: 1.35 MG/DL
DEPRECATED RDW RBC AUTO: 43.1 FL (ref 35.1–46.3)
EGFRCR SERPLBLD CKD-EPI 2021: 53 ML/MIN/1.73M2 (ref 60–?)
EOSINOPHIL # BLD: 0 X10(3) UL (ref 0–0.7)
EOSINOPHIL NFR BLD: 0 %
ERYTHROCYTE [DISTWIDTH] IN BLOOD BY AUTOMATED COUNT: 13.9 % (ref 11–15)
GLOBULIN PLAS-MCNC: 2.6 G/DL (ref 2–3.5)
GLUCOSE BLD-MCNC: 123 MG/DL (ref 70–99)
GLUCOSE BLDC GLUCOMTR-MCNC: 112 MG/DL (ref 70–99)
GLUCOSE BLDC GLUCOMTR-MCNC: 121 MG/DL (ref 70–99)
GLUCOSE BLDC GLUCOMTR-MCNC: 121 MG/DL (ref 70–99)
GLUCOSE BLDC GLUCOMTR-MCNC: 139 MG/DL (ref 70–99)
GLUCOSE BLDC GLUCOMTR-MCNC: 156 MG/DL (ref 70–99)
HCT VFR BLD AUTO: 38.2 %
HCT VFR BLD AUTO: 40.4 %
HCT VFR BLD AUTO: 43.3 %
HGB BLD-MCNC: 13 G/DL
HGB BLD-MCNC: 13.9 G/DL
HGB BLD-MCNC: 14 G/DL
LIPASE SERPL-CCNC: 397 U/L (ref 12–53)
LYMPHOCYTES NFR BLD: 1.3 X10(3) UL (ref 1–4)
LYMPHOCYTES NFR BLD: 7 %
MCH RBC QN AUTO: 29.2 PG (ref 26–34)
MCHC RBC AUTO-ENTMCNC: 34.4 G/DL (ref 31–37)
MCV RBC AUTO: 84.9 FL
MONOCYTES # BLD: 0.19 X10(3) UL (ref 0.1–1)
MONOCYTES NFR BLD: 1 %
MORPHOLOGY: NORMAL
NEUTROPHILS # BLD AUTO: 15.46 X10 (3) UL (ref 1.5–7.7)
NEUTROPHILS NFR BLD: 80 %
NEUTS BAND NFR BLD: 12 %
NEUTS HYPERSEG # BLD: 17.11 X10(3) UL (ref 1.5–7.7)
OSMOLALITY SERPL CALC.SUM OF ELEC: 299 MOSM/KG (ref 275–295)
PLATELET # BLD AUTO: 161 10(3)UL (ref 150–450)
PLATELET MORPHOLOGY: NORMAL
POTASSIUM SERPL-SCNC: 4.4 MMOL/L (ref 3.5–5.1)
PROT SERPL-MCNC: 6.5 G/DL (ref 5.7–8.2)
RBC # BLD AUTO: 4.76 X10(6)UL
SODIUM SERPL-SCNC: 142 MMOL/L (ref 136–145)
TOTAL CELLS COUNTED BLD: 100
WBC # BLD AUTO: 18.6 X10(3) UL (ref 4–11)

## 2025-03-17 PROCEDURE — 83690 ASSAY OF LIPASE: CPT | Performed by: SPECIALIST

## 2025-03-17 PROCEDURE — 85060 BLOOD SMEAR INTERPRETATION: CPT | Performed by: INTERNAL MEDICINE

## 2025-03-17 PROCEDURE — 85025 COMPLETE CBC W/AUTO DIFF WBC: CPT | Performed by: INTERNAL MEDICINE

## 2025-03-17 PROCEDURE — 84520 ASSAY OF UREA NITROGEN: CPT | Performed by: INTERNAL MEDICINE

## 2025-03-17 PROCEDURE — 85018 HEMOGLOBIN: CPT | Performed by: INTERNAL MEDICINE

## 2025-03-17 PROCEDURE — 94760 N-INVAS EAR/PLS OXIMETRY 1: CPT

## 2025-03-17 PROCEDURE — 82962 GLUCOSE BLOOD TEST: CPT

## 2025-03-17 PROCEDURE — 85007 BL SMEAR W/DIFF WBC COUNT: CPT | Performed by: INTERNAL MEDICINE

## 2025-03-17 PROCEDURE — 82150 ASSAY OF AMYLASE: CPT | Performed by: SPECIALIST

## 2025-03-17 PROCEDURE — 85027 COMPLETE CBC AUTOMATED: CPT | Performed by: INTERNAL MEDICINE

## 2025-03-17 PROCEDURE — 78226 HEPATOBILIARY SYSTEM IMAGING: CPT | Performed by: SPECIALIST

## 2025-03-17 PROCEDURE — 80053 COMPREHEN METABOLIC PANEL: CPT | Performed by: INTERNAL MEDICINE

## 2025-03-17 PROCEDURE — 85014 HEMATOCRIT: CPT | Performed by: INTERNAL MEDICINE

## 2025-03-17 RX ORDER — HYDROCODONE BITARTRATE AND ACETAMINOPHEN 5; 325 MG/1; MG/1
1 TABLET ORAL EVERY 6 HOURS PRN
Status: DISCONTINUED | OUTPATIENT
Start: 2025-03-17 | End: 2025-03-21

## 2025-03-17 NOTE — PROGRESS NOTES
[facility]  part of [facility]    Progress Note    Francisco Del Rio Patient Status:  Inpatient    10/24/1945 MRN [id number]   Location [facility] 5SW/SE Attending Omayra Henao MD   Hosp Day # 1 PCP IRIS BOLAÑOS MD     Chief Complaint: Abd pain    Subjective:     Constitutional: Negative.    HENT: Negative.     Eyes: Negative.    Respiratory: Negative.     Cardiovascular: Negative.    Gastrointestinal:  Positive for abdominal pain.   Endocrine: Negative.    Musculoskeletal: Negative.    Allergic/Immunologic: Negative.    Neurological: Negative.    Psychiatric/Behavioral: Negative.         Objective:   Blood pressure (!) 161/70, pulse 85, temperature 98.4 °F (36.9 °C), temperature source Oral, resp. rate 20, height 5' 6\" (1.676 m), weight 179 lb 3.2 oz (81.3 kg), SpO2 94%.  Physical Exam  Vitals and nursing note reviewed.   HENT:      Head: Normocephalic and atraumatic.   Eyes:      Pupils: Pupils are equal, round, and reactive to light.   Cardiovascular:      Rate and Rhythm: Normal rate and regular rhythm.      Pulses: Normal pulses.      Heart sounds: Normal heart sounds.   Pulmonary:      Effort: Pulmonary effort is normal.      Breath sounds: Normal breath sounds.   Abdominal:      General: Abdomen is flat. Bowel sounds are normal.      Palpations: Abdomen is soft.   Musculoskeletal:      Cervical back: Neck supple.   Skin:     General: Skin is warm.   Neurological:      General: No focal deficit present.      Mental Status: He is alert.   Psychiatric:         Mood and Affect: Mood normal.         Results:   Lab Results   Component Value Date    WBC 18.6 (H) 2025    HGB 13.9 2025    HCT 40.4 2025    .0 2025    CREATSERUM 1.35 (H) 2025    BUN 22 2025    BUN 22 2025     2025    K 4.4 2025     2025    CO2 26.0 2025     (H) 2025    CA 8.6 (L) 2025    ALB 3.9 2025     ALKPHO 136 (H) 03/17/2025    BILT 0.8 03/17/2025    TP 6.5 03/17/2025    AST 83 (H) 03/17/2025    ALT 93 (H) 03/17/2025    PTT 22.8 (L) 03/16/2025    INR 0.96 03/16/2025    KILLIAN 724 (H) 03/17/2025     (HH) 03/17/2025    TROPHS 5 03/15/2025       NM GB HEPATOBILIARY SCAN  (CPT=78226)    Result Date: 3/17/2025  CONCLUSION: Normal examination.     Dictated by (CST): Nilton Arreola MD on 3/17/2025 at 1:06 PM     Finalized by (CST): Nilton Arreola MD on 3/17/2025 at 1:09 PM          CT ABDOMEN+PELVIS(CONTRAST ONLY)(CPT=74177)    Result Date: 3/16/2025  CONCLUSION:   1. Moderate to severe acute interstitial pancreatitis.  No discrete peripancreatic fluid collection is identified.  2. Gallbladder wall thickened may be reactive to pancreatitis.  No radiopaque gallstone is seen.  This is evaluated to advantage on subsequent right upper quadrant ultrasound and MRI/MRCP.  3. Questionable filling defect in the portal vein likely relates to venous admixture.  4. Pulmonary edema.  5. Infrarenal abdominal aortic aneurysm at penetrating ulcer measuring 3.1 cm.  6. Prostatomegaly.  7. Colonic diverticulosis. Additional chronic or incidental findings are described in the body of this report.      Preliminary report was given by [facility].  There are no clinically significant discrepancies.    elm-remote     Dictated by (CST): Emilio Pretty MD on 3/16/2025 at 1:47 PM     Finalized by (CST): Emilio Pretty MD on 3/16/2025 at 1:53 PM          MRI ABDOMEN AND MRCP W/3D (CPT=74181/75012)    Result Date: 3/16/2025  CONCLUSION:   1. No gallstones.  No choledocholithiasis.  Gallbladder wall thickening and edema is probably reactive to adjacent pancreatitis, but acute cholecystitis is not excluded.  2. Severe acute interstitial pancreatitis.  No discrete peripancreatic fluid collections are seen.  Due to lack of intravenous contrast, the pancreas is not well evaluated for necrosis.  3. Small right pleural effusion.    elm-remote     Dictated by (CST): Emilio Pretty MD on 3/16/2025 at 8:56 AM     Finalized by (CST): Emilio Pretty MD on 3/16/2025 at 9:01 AM          US GALLBLADDER (CPT=76705)    Result Date: 3/16/2025  CONCLUSION:   Gallbladder mild distension and wall thickening/edema may indicate acute cholecystitis.  Note no gallstones are identified.  No biliary ductal dilatation.  Suboptimal evaluation of the portal vein is negative for portal venous thrombus.      Preliminary report was given by [facility].  There are no clinically significant discrepancies.    elm-remote     Dictated by (CST): Emilio Pretty MD on 3/16/2025 at 7:22 AM     Finalized by (CST): Emilio Pretty MD on 3/16/2025 at 7:25 AM         EKG    Result Date: 3/16/2025  Sinus rhythm pac's Left axis deviation Low voltage QRS, consider pulmonary disease, pericardial effusion, or normal variant Nonspecific ST and T wave abnormality Abnormal ECG When compared with ECG of 25-AUG-2023 14:31, No significant change Confirmed by LIZETH WHARTON, CHATMAN ([id number]) on 3/16/2025 4:40:30 PM     Assessment & Plan:       Acute pancreatitis, unspecified complication status, unspecified pancreatitis type (HCC)  NPO, IV fluids ,HIDA scan today, negative    Acute cholecystitis , started on clear liquids  Pain control  HTN BP stable  HLD on statin  DM monitor   On Rocephin and Flagyl  D/W RN  Time spent 35 mins      HERMAN COOK, MD YORDY  3/17/2025

## 2025-03-17 NOTE — PLAN OF CARE
Problem: Patient Centered Care  Goal: Patient preferences are identified and integrated in the patient's plan of care  Description: Interventions:- What would you like us to know as we care for you? From home alone- Provide timely, complete, and accurate information to patient/family- Incorporate patient and family knowledge, values, beliefs, and cultural backgrounds into the planning and delivery of care- Encourage patient/family to participate in care and decision-making at the level they choose- Honor patient and family perspectives and choices  Outcome: Progressing     Problem: Diabetes/Glucose Control  Goal: Glucose maintained within prescribed range  Description: INTERVENTIONS:- Monitor Blood Glucose as ordered- Assess for signs and symptoms of hyperglycemia and hypoglycemia- Administer ordered medications to maintain glucose within target range- Assess barriers to adequate nutritional intake and initiate nutrition consult as needed- Instruct patient on self management of diabetes  Outcome: Progressing     Problem: Patient/Family Goals  Goal: Patient/Family Long Term Goal  Description: Patient's Long Term Goal: Discharge from the hospital   Interventions:  - Monitor vital signs  - Monitor appropriate labs  - Monitor blood glucose levels  - Pain management  - Administer medications per order  - Follow MD orders  - Diagnostics per order  - Update / inform patient and family on plan of care  - Discharge planning  - See additional Care Plan goals for specific interventions  Outcome: Progressing  Goal: Patient/Family Short Term Goal  Description: Patient's Short Term Goal: Improve abdominal pain and vomiting   Interventions:   - Monitor vital signs  - Monitor appropriate labs  - Monitor blood glucose levels  - Pain management  - Administer medications per order  - Follow MD orders  - Diagnostics per order  - Update / inform patient and family on plan of care- See additional Care Plan goals for specific  interventions  Outcome: Progressing     Problem: SAFETY ADULT - FALL  Goal: Free from fall injury  Description: INTERVENTIONS:- Assess pt frequently for physical needs- Identify cognitive and physical deficits and behaviors that affect risk of falls.- [place] fall precautions as indicated by assessment.- Educate pt/family on patient safety including physical limitations- Instruct pt to call for assistance with activity based on assessment- Modify environment to reduce risk of injury- Provide assistive devices as appropriate- Consider OT/PT consult to assist with strengthening/mobility- Encourage toileting schedule  Outcome: Progressing     Problem: DISCHARGE PLANNING  Goal: Discharge to home or other facility with appropriate resources  Description: INTERVENTIONS:- Identify barriers to discharge w/pt and caregiver- Include patient/family/discharge partner in discharge planning- Arrange for needed discharge resources and transportation as appropriate- Identify discharge learning needs (meds, wound care, etc)- Arrange for interpreters to assist at discharge as needed- Consider post-discharge preferences of patient/family/discharge partner- Complete POLST form as appropriate- Assess patient's ability to be responsible for managing their own health- Refer to Case Management Department for coordinating discharge planning if the patient needs post-hospital services based on physician/LIP order or complex needs related to functional status, cognitive ability or social support system  Outcome: Progressing     Problem: Altered Communication/Language Barrier  Goal: Patient/Family is able to understand and participate in their care  Description: Interventions:- Assess communication ability and preferred communication style- Implement communication aides and strategies- Use visual cues when possible- Listen attentively, be patient, do not interrupt- Minimize distractions- Allow time for understanding and response- Establish  method for patient to ask for assistance (call light)- Provide an  as needed- Communicate barriers and strategies to overcome with those who interact with patient  Outcome: Progressing     Problem: GASTROINTESTINAL - ADULT  Goal: Minimal or absence of nausea and vomiting  Description: INTERVENTIONS:- Maintain adequate hydration with IV or PO as ordered and tolerated- Nasogastric tube to low intermittent suction as ordered- Evaluate effectiveness of ordered antiemetic medications- Provide nonpharmacologic comfort measures as appropriate- Advance diet as tolerated, if ordered- Obtain nutritional consult as needed- Evaluate fluid balance  Outcome: Progressing  Goal: Maintains or returns to baseline bowel function  Description: INTERVENTIONS:- Assess bowel function- Maintain adequate hydration with IV or PO as ordered and tolerated- Evaluate effectiveness of GI medications- Encourage mobilization and activity- Obtain nutritional consult as needed- Establish a toileting routine/schedule- Consider collaborating with pharmacy to review patient's medication profile  Outcome: Progressing     Problem: SKIN/TISSUE INTEGRITY - ADULT  Goal: Skin integrity remains intact  Description: INTERVENTIONS- Assess and document risk factors for pressure ulcer development- Assess and document skin integrity- Monitor for areas of redness and/or skin breakdown- Initiate interventions, skin care algorithm/standards of care as needed  Outcome: Progressing     Problem: PAIN - ADULT  Goal: Verbalizes/displays adequate comfort level or patient's stated pain goal  Description: INTERVENTIONS:- Encourage pt to monitor pain and request assistance- Assess pain using appropriate pain scale- Administer analgesics based on type and severity of pain and evaluate response- Implement non-pharmacological measures as appropriate and evaluate response- Consider cultural and social influences on pain and pain management- Manage/alleviate anxiety-  Utilize distraction and/or relaxation techniques- Monitor for opioid side effects- Notify MD/LIP if interventions unsuccessful or patient reports new pain- Anticipate increased pain with activity and pre-medicate as appropriate  Outcome: Not Progressing

## 2025-03-17 NOTE — PAYOR COMM NOTE
--------------  ADMISSION REVIEW     Payor: [employer]  Subscriber #:  [id number]  Authorization Number: [id number]    Admit date: 3/16/25  Admit time:  4:05 AM       REVIEW DOCUMENTATION:     ED Provider Notes        ED Provider Notes signed by Matheus Perez MD at 3/16/2025  6:41 AM       Author: Matheus Perez MD Service: -- Author Type: Physician    Filed: 3/16/2025  6:41 AM Date of Service: 3/16/2025  1:46 AM Status: Signed    : Matheus Perez MD (Physician)           Patient Seen in: [facility] Emergency Department    History     Chief Complaint   Patient presents with    Abdomen/Flank Pain    Nausea/Vomiting/Diarrhea       HPI    79-year-old male who presents to the emergency department given several hours of generalized abdominal pain mostly in the periumbilical region associated with nausea and nonbloody nonbilious emesis.  No chest pain or dyspnea.  No fevers    History reviewed.   Past Medical History:    Diabetes (HCC)    Esophageal reflux    Gastritis    High blood pressure    High cholesterol    Other and unspecified hyperlipidemia       History reviewed. History reviewed. No pertinent surgical history.      Medications :  Prescriptions Prior to Admission[1]     Family History   Problem Relation Age of Onset    Lipids Mother        Smoking Status:   Social History     Socioeconomic History    Marital status:    Tobacco Use    Smoking status: Some Days     Current packs/day: 0.50     Average packs/day: 0.5 packs/day for 50.0 years (25.0 ttl pk-yrs)     Types: Cigarettes    Smokeless tobacco: Never   Vaping Use    Vaping status: Never Used   Substance and Sexual Activity    Alcohol use: Not Currently     Alcohol/week: 8.3 standard drinks of alcohol     Types: 10 Cans of beer per week    Drug use: No   Other Topics Concern    Caffeine Concern Yes     Comment: Coffee, soda        Constitutional and vital signs reviewed.      Social History and Family  History elements reviewed from today, pertinent positives to the presenting problem noted.    Physical Exam     ED Triage Vitals   BP 03/15/25 2307 154/70   Pulse 03/15/25 2307 74   Resp 03/15/25 2307 22   Temp 03/15/25 2346 97.6 °F (36.4 °C)   Temp src 03/15/25 2346 Temporal   SpO2 03/15/25 2307 97 %   O2 Device 03/15/25 2307 None (Room air)       All measures to prevent infection transmission during my interaction with the patient were taken. The patient was already wearing a droplet mask on my arrival to the room. Personal protective equipment was worn throughout the duration of the exam.  Handwashing was performed prior to and after the exam.  Stethoscope and any equipment used during my examination was cleaned with super sani-cloth germicidal wipes following the exam.     Physical Exam    General: mild distress  Head: Normocephalic and atraumatic.  Mouth/Throat/Ears/Nose: Oropharynx is clear    Eyes: Conjunctivae and EOM are normal.   Neck: Normal range of motion. Supple.   Cardiovascular: Normal rate, regular rhythm, normal heart sounds.  Respiratory/Chest: Clear and equal bilaterally. Exhibits no tenderness.  Gastrointestinal: Soft, periumbilical-tenderness, non-distended. Bowel sounds are normal.   Musculoskeletal:No swelling or deformity.   Neurological: Alert and appropriate. No focal deficits.   Skin: Skin is warm and dry. No pallor.  Psychiatric: Has a normal mood and affect.      ED Course        Labs Reviewed   COMP METABOLIC PANEL (14) - Abnormal; Notable for the following components:       Result Value    Glucose 284 (*)     Creatinine 1.56 (*)     Calculated Osmolality 304 (*)     eGFR-Cr 45 (*)     ALT 92 (*)      (*)     Alkaline Phosphatase 197 (*)     All other components within normal limits   CBC WITH DIFFERENTIAL WITH PLATELET - Abnormal; Notable for the following components:    WBC 19.8 (*)     Neutrophil Absolute Prelim 15.81 (*)     Neutrophil Absolute 15.81 (*)     Monocyte  Absolute 1.15 (*)     All other components within normal limits   LIPASE - Abnormal; Notable for the following components:    Lipase >3,500 (*)     All other components within normal limits   PTT, ACTIVATED - Abnormal; Notable for the following components:    PTT 22.8 (*)     All other components within normal limits   LACTIC ACID, PLASMA - Abnormal; Notable for the following components:    Lactic Acid 3.8 (*)     All other components within normal limits   LACTIC ACID REFLEX POST POSTIVE - Abnormal; Notable for the following components:    Lactic Acid 3.4 (*)     All other components within normal limits   POCT GLUCOSE - Abnormal; Notable for the following components:    POC Glucose  165 (*)     All other components within normal limits   POCT GLUCOSE - Abnormal; Notable for the following components:    POC Glucose  174 (*)     All other components within normal limits   TROPONIN I HIGH SENSITIVITY - Normal   TRIGLYCERIDES - Normal   PROTHROMBIN TIME (PT) - Normal   HEMOGLOBIN A1C   LACTIC ACID REFLEX POST POSITIVE [id number]   RAINBOW DRAW LIGHT GREEN   RAINBOW DRAW LAVENDER   BLOOD CULTURE   BLOOD CULTURE     EKG     Rate, intervals and axes as noted on EKG Report.  Rate: 59  Rhythm: Sinus Rhythm  Reading: Sinus bradycardia rate 59, normal axis, PVC noted.  No STEMI.           As Interpreted by me    Imaging Results Available and Reviewed while in ED: No results found.  ED Medications Administered:   Medications   morphINE PF 2 MG/ML injection 2 mg (2 mg Intravenous Given 3/16/25 0538)   ondansetron (Zofran) 4 MG/2ML injection 4 mg (has no administration in time range)   dextrose 5%-sodium chloride 0.45% infusion ( Intravenous New Bag 3/16/25 0538)   cefTRIAXone (Rocephin) 2 g in sodium chloride 0.9% 100 mL IVPB-ADDV (has no administration in time range)   metroNIDAZOLE in sodium chloride 0.79% (Flagyl) 5 mg/mL IVPB premix 500 mg (has no administration in time range)   alum-mag hydroxide-simethicone (Maalox)  200-200-20 MG/5ML oral suspension 30 mL (30 mL Oral Given 3/15/25 2337)   famotidine (Pepcid) 20 mg/2mL injection 20 mg (20 mg Intravenous Given 3/15/25 2332)   ondansetron (Zofran) 4 MG/2ML injection 4 mg (4 mg Intravenous Given 3/15/25 2335)   iopamidol 76% (ISOVUE-370) injection for power injector (75 mL Intravenous Given 3/16/25 0014)   sodium chloride 0.9 % IV bolus 1,000 mL (0 mL Intravenous Stopped 3/16/25 0142)   morphINE PF 4 MG/ML injection 6 mg (6 mg Intravenous Given 3/16/25 0048)   cefTRIAXone (Rocephin) 2 g in sodium chloride 0.9% 100 mL IVPB-ADDV (0 g Intravenous Stopped 3/16/25 0320)   metroNIDAZOLE in sodium chloride 0.79% (Flagyl) 5 mg/mL IVPB premix 500 mg (500 mg Intravenous Handoff 3/16/25 0338)   morphINE PF 4 MG/ML injection 4 mg (4 mg Intravenous Given 3/16/25 0348)   ondansetron (Zofran) 4 MG/2ML injection 4 mg (4 mg Intravenous Given 3/16/25 0348)         MDM     Vitals:    03/16/25 0351 03/16/25 0407 03/16/25 0408 03/16/25 0538   BP: 150/71  (!) 157/91 155/88   BP Location:   Left arm Right arm   Pulse: 95  92 91   Resp: 25 22    Temp:   98.6 °F (37 °C)    TempSrc:   Oral    SpO2: 98%  96% 94%   Weight:  81.3 kg     Height:  167.6 cm (5' 6\")       *I personally reviewed and interpreted all ED vitals.    Pulse Ox: 98%, Room air, Normal     Monitor Interpretation:   normal sinus rhythm as interpreted by me.  The cardiac monitor was ordered concern for electrolyte derangements.      Medical Decision Making      Differential Diagnosis/ Diagnostic Considerations: Pancreatitis, bowel obstruction, volvulus    Complicating Factors: The patient already has  to contribute to the complexity of this ED evaluation.    I reviewed prior chart records including ED note from August 25, 2023.  Patient here with pancreatitis on my interpretation of the lab work, mild elevation in transaminases and aside from pancreatitis seen on CT scan on my interpretation, there is also signs of cholecystitis.  Right  upper quadrant ultrasound also with signs of cholecystitis, acalculous.  I am concern for gallstone pancreatitis.  Discussed with . Dr. Avani Schneider. MRCP ordered. Admitted to Dr. Henao.     Admitted In stable condition.  Patient is comfortable with the plan.     I spent 30 minutes of critical care time caring for this patient. This does not include time spent on separately reported billable procedures.         Disposition and Plan     Clinical Impression:  1. Acute pancreatitis, unspecified complication status, unspecified pancreatitis type (HCC)    2. Acute cholecystitis        Disposition:  Admit    Follow-up:  No follow-up provider specified.    Medications Prescribed:  Current Discharge Medication List          Hospital Problems       Present on Admission  Date Reviewed: 9/1/2016            ICD-10-CM Noted POA    * (Principal) Acute pancreatitis, unspecified complication status, unspecified pancreatitis type (HCC) K85.90 3/16/2025 Unknown    Acute cholecystitis K81.0 3/16/2025 Unknown    Pancreatitis (HCC) K85.90 3/16/2025 Unknown                    Signed by Matheus Perez MD on 3/16/2025  6:41 AM         MEDICATIONS ADMINISTERED IN LAST 1 DAY:  cefTRIAXone (Rocephin) 2 g in sodium chloride 0.9% 100 mL IVPB-ADDV       Date Action Dose Route User    3/17/2025 0353 New Bag 2 g Intravenous Bria Zamudio RN          HYDROcodone-acetaminophen (Norco) 5-325 MG per tab 1 tablet       Date Action Dose Route User    3/17/2025 1548 Given 1 tablet Oral Herlinda Yadav RN          HYDROmorphone (Dilaudid) 1 MG/ML injection 0.5 mg       Date Action Dose Route User    3/17/2025 1325 Given 0.5 mg Intravenous Herlinda Yadav RN    3/17/2025 0950 Given 0.5 mg Intravenous Herlinda Yadav RN    3/17/2025 0640 Given 0.5 mg Intravenous Bria Zamudio RN    3/17/2025 0209 Given 0.5 mg Intravenous Bria Zamudio RN    3/16/2025 2131 Given 0.5 mg Intravenous Bria Zamudio RN    3/16/2025 1615 Given 0.5 mg  Intravenous Ani Garcia, PARTH          lactated ringers IV bolus 500 mL       Date Action Dose Route User    3/16/2025 1841 New Bag 500 mL Intravenous Ani Garcia, PARTH          metroNIDAZOLE in sodium chloride 0.79% (Flagyl) 5 mg/mL IVPB premix 500 mg       Date Action Dose Route User    3/17/2025 1535 New Bag 500 mg Intravenous Herlinda Yadav, PARTH    3/17/2025 0209 New Bag 500 mg Intravenous Bria Zamudio, PARTH          ondansetron (Zofran) 4 MG/2ML injection 4 mg       Date Action Dose Route User    3/17/2025 0353 Given 4 mg Intravenous Bria Zamudio, PARTH            Vitals (last day)       Date/Time Temp Pulse Resp BP SpO2 Weight O2 Device O2 Flow Rate (L/min) [facility]    03/17/25 1534 98.6 °F (37 °C) 95 18 157/68 94 % -- Nasal cannula 1 L/min KF    03/17/25 0921 98.4 °F (36.9 °C) 85 20 161/70 94 % -- Nasal cannula 1 L/min KF    03/16/25 2100 99.5 °F (37.5 °C) 81 21 137/75 95 % -- Nasal cannula 1 L/min BL    03/16/25 1501 99.1 °F (37.3 °C) 95 22 134/91 96 % -- Nasal cannula 1 L/min YD    03/16/25 1031 98.9 °F (37.2 °C) 99 20 146/74 96 % -- Nasal cannula 1 L/min YD    03/16/25 0538 -- 91 -- 155/88 94 % -- Nasal cannula 2 L/min SH    03/16/25 0408 98.6 °F (37 °C) 92 22 157/91 96 % -- Nasal cannula 2 L/min SH    03/16/25 0407 -- -- -- -- -- 179 lb 3.2 oz (81.3 kg) -- -- BP    03/16/25 0351 -- 95 25 150/71 98 % -- Nasal cannula 2 L/min AG    03/16/25 0315 -- 91 24 159/87 97 % -- Nasal cannula 2 L/min AG    03/16/25 0205 -- 66 29 144/78 93 % -- None (Room air) --     03/16/25 0045 -- 77 28 154/54 98 % -- None (Room air) --     03/16/25 0000 -- 54 30 140/57 97 % -- None (Room air) --          H&P    Date:  3/16/2025  Date of Admission:  3/15/2025     History provided by:patient and son  HPI:          Chief Complaint   Patient presents with    Abdomen/Flank Pain    Nausea/Vomiting/Diarrhea      79 year old male with PMH of HTN, HLD, DM presents to ER with abdominal pain and vomiting. Patient has elevated Lipase and US  GB revealed GB wall thickening C/W acute cholecystitis        Assessment/Plan:     Acute pancreatitis, unspecified complication status, unspecified pancreatitis type (HCC)  NPO, IV fluids , GI evaluation    Acute cholecystitis  NPO, IV fluid surgery evaluation  HTN BP stable  HLD on statin  DM monitor   On Rocephin and Flagyl  D/W son at bedside  D/W RN    3/16 GI CONSULT NOTE    Date of Admission:  3/15/2025  Reason for Consultation:   Gallstone pancreatitis     History of Present Illness:   Patient is a 79 year old male with hx of DM, htn, hlp, gerd who was admitted to the hospital for Acute pancreatitis, unspecified complication status, unspecified pancreatitis type (HCC) with likely recent but resolved choledocolithiasis.  Lactate elevated at 3.4 this am with leukocytosis at admission, persistent hyperglycema; normal inr, normal tbili and hbg on admission.  Interview with both patient and son.     Ruq c/w cholecystitis with normal cbd and liver; mri/mrcp showed severe pancreatitis but unable to assess for necrosis; normal ducts/no stones       Physical Exam:   Blood pressure 146/74, pulse 99, temperature 98.9 °F (37.2 °C), temperature source Oral, resp. rate 20, height 5' 6\" (1.676 m), weight 179 lb 3.2 oz (81.3 kg), SpO2 96%.     General appearance:  alert, appears stated age and cooperative     HEENT: negative findings: lids and lashes normal and conjunctivae and sclerae normal.      Pulmonary: clear to auscultation bilaterally of anterior chest     Cardiovascular: regular rate and rhythm     Abdominal: soft, bowel sounds present; +distended, +tender to palp in epigastric region w/o rebound nor guarding     Extremities: No edema, cyanosis, or clubbing     Skin: warm and dry     Neurologic: Grossly normal     Psychiatric: calm        Results:      Laboratory Data:        Lab Results   Component Value Date     WBC 10.5 03/16/2025     HGB 14.4 03/16/2025     HCT 44.7 03/16/2025     .0 03/16/2025      CREATSERUM 1.48 (H) 03/16/2025     BUN 19 03/16/2025      03/16/2025     K 4.5 03/16/2025      03/16/2025     CO2 24.0 03/16/2025      (H) 03/16/2025     CA 8.7 03/16/2025     ALB 4.3 03/16/2025     ALKPHO 192 (H) 03/16/2025     TP 6.9 03/16/2025      (H) 03/16/2025      (H) 03/16/2025     PTT 22.8 (L) 03/16/2025     INR 0.96 03/16/2025     PTP 13.4 03/16/2025     LIP >3,500 (H) 03/15/2025     TROPHS 5 03/15/2025          Imaging:  MRI ABDOMEN AND MRCP W/3D (CPT=74181/63375)     Result Date: 3/16/2025  CONCLUSION:          1. No gallstones.  No choledocholithiasis.  Gallbladder wall thickening and edema is probably reactive to adjacent pancreatitis, but acute cholecystitis is not excluded.  2. Severe acute interstitial pancreatitis.  No discrete peripancreatic fluid collections are seen.  Due to lack of intravenous contrast, the pancreas is not well evaluated for necrosis.  3. Small right pleural effusion.    elm-remote    Dictated by (CST): Emilio Pretty MD on 3/16/2025 at 8:56 AM     Finalized by (CST): Emilio Pretty MD on 3/16/2025 at 9:01 AM           US GALLBLADDER (CPT=76705)     Result Date: 3/16/2025  CONCLUSION:          Gallbladder mild distension and wall thickening/edema may indicate acute cholecystitis.  Note no gallstones are identified.  No biliary ductal dilatation.  Suboptimal evaluation of the portal vein is negative for portal venous thrombus.      Preliminary report was given by [facility].  There are no clinically significant discrepancies.    elm-remote     Dictated by (CST): Emilio Pretty MD on 3/16/2025 at 7:22 AM     Finalized by (CST): Emilio Pretty MD on 3/16/2025 at 7:25 AM                Impression:   1)Acute pancreatitis, unspecified complication status, unspecified pancreatitis type (HCC) - ddx includes gallstone related andre in setting of elevated laes plus cholecystitis but includes etoh and idiopathic and at this age, malignancy               -mri/mrcp has r/o choledocho but due to lack of contrast, unable to assess for masses or necrosis; outpatient imaging to r/o mass after resolution; CT reassuring for lack of necrosis              -IV hydration: LR bolus of 20 mL/kg then 3 mL/kg/hour for 12 hours (81 kg)                           -trend bun/cr and hbg to ensure adequate resuscitation/treatment                             2)Acute cholecystitis without choledocholithiasis  -iv antibiotics  -surgery consult     3)Elevated lactate with leukocytosis - ddx including acute cholecystitis and likely acute pancreatitis   -trend lactate  -blood cultures ordered  -r/o other organs:  cxr, ua, covid, respiration(defer to primary)     4)elevated ast/alt/alk phos - acute elevated in setting as noted above (normal in 11/2024); ast> alt with alk phos > alt supporting cholestatic pattern and concern for etoh related  -trend but if ast/alk phos remain elevated, consider non liver pathology      5)CKD - baseline 1.2-1.5              -continue to trend     3/17 GEN SURGERY CONSULT NOTE   of Consult:  3/17/2025     Reason for Consultation:  Abd pain       History of Present Illness:  Francisco Del Rio is a a(n) 79 year old male. States 24 hx of epigastric pain   no n or v or change bowels   denies ETOH , or other meds   No trauma   Hx of DM     See pmh below      Physical Exam:  Blood pressure 137/75, pulse 81, temperature 99.5 °F (37.5 °C), temperature source Oral, resp. rate 21, height 5' 6\" (1.676 m), weight 179 lb 3.2 oz (81.3 kg), SpO2 95%.     General: Alert, orientated x3.  Cooperative.  No apparent distress.  Lungs: per attending  Cardiac: per attending  Abdomen:  Soft, min distention  tender epigastric area   Skin: Normal texture and turgor.. slightly dusky  Neurologic: per attending      Laboratory Data:        Lab Results   Component Value Date     WBC 18.6 (H) 03/17/2025     HGB 13.9 03/17/2025     HCT 40.4 03/17/2025     .0 03/17/2025     CREATSERUM 1.35  (H) 03/17/2025     BUN 22 03/17/2025     BUN 22 03/17/2025      03/17/2025     K 4.4 03/17/2025      03/17/2025     CO2 26.0 03/17/2025      (H) 03/17/2025     CA 8.6 (L) 03/17/2025     ALB 3.9 03/17/2025     ALKPHO 136 (H) 03/17/2025     BILT 0.8 03/17/2025     TP 6.5 03/17/2025     AST 83 (H) 03/17/2025     ALT 93 (H) 03/17/2025     Impression and Plan:      Patient Active Problem List   Diagnosis    Type II diabetes mellitus, uncontrolled    Hypercholesterolemia    Hypertension, benign    Tobacco use disorder    Acute renal failure    Herpes zoster    Pancreatitis (HCC)    Acute pancreatitis, unspecified complication status, unspecified pancreatitis type (HCC)    Acute cholecystitis   Increase in LFT's    TB 0.8  Lipase greater than 3500  Wbc 18.6   hgb 13.9  MRCP CT US   no cholelithiasis   some wall thickening   severe edematous pancreatitis  Main diagnosis is pancreatitis   denies any factors causing this   Though still possibility he passed a stone via ampulla  Advise bowel rest   clear liq only   Recheck enzymes  Hepato-biliary scan to see if GB fills though thickening may be reactive from pancreatitis  Also may need repeat CT soon to see status of pancreatitis     3/17 GI NOTE    Subjective:   Pain improved, but still 8-9/10.  No emesis.  Afebrile          Impression:   1)Acute pancreatitis, unspecified complication status, suspect gallstone pancreatitis.  MRI/MRCP w/o choledocholithiasis.                -IV hydration: LR bolus of 20 mL/kg then 3 mL/kg/hour for 12 hours (81 kg)              -maintain NPO status until cleared by surgery to advance to clears.                - if no significant improvement in pain by tomorrow or worsening leukocytosis, consider repeating CT scan                            2)Acute cholecystitis without choledocholithiasis  -iv antibiotics  -surgery consult, HIDA ordered     3)elevated ast/alt/alk phos - acute elevated in setting as noted above (normal in  11/2024); ast> alt with alk phos > alt supporting cholestatic pattern  -serially improving.  Suspect passed gallstone.       4) ANDRES on CKD  - improving with LR for possible cholecystitis     5) Leukocytosis suspect multifactorial from cholecystitis and pancreatitis  - on IV Abx for      3/17 INTERNAL MED NOTE       Assessment & Plan:  Acute pancreatitis, unspecified complication status, unspecified pancreatitis type (HCC)  NPO, IV fluids ,HIDA scan today, negative    Acute cholecystitis , started on clear liquids  Pain control  HTN BP stable  HLD on statin  DM monitor   On Rocephin and Reji  D/W RN           [1]   Medications Prior to Admission   Medication Sig Dispense Refill Last Dose/Taking    simvastatin 40 MG Oral Tab Take 1 tablet (40 mg total) by mouth nightly. 30 tablet 3 3/14/2025    Pantoprazole Sodium 40 MG Oral Tab EC Take 1 tablet (40 mg total) by mouth once daily. 90 tablet 1 3/15/2025 Morning    lisinopril 10 MG Oral Tab Take 0.5 tablets (5 mg total) by mouth daily. 90 tablet 1 3/15/2025 Noon    MetFORMIN HCl 500 MG Oral Tab 1/2 tab PO q12 hrs. Pre meals. (Patient taking differently: Take 1 tablet (500 mg total) by mouth 2 (two) times daily with meals.) 90 tablet 1 3/15/2025 Morning    Glucose Blood In Vitro Strip Checks QAM Dx. 250.02. One touch ultra. 100 strip 6 Taking    Glucose Blood (FREESTYLE LITE TEST) In Vitro Strip Dx. 250.02. take by Misc.(Non-Drug; Combo Route) route twice a day 100 strip 6 Taking    FreeStyle Lancets Does not apply Misc as directed   Taking    Blood Glucose Monitoring Suppl (FREESTYLE LITE) Does not apply Device as directed   Taking    aspirin 81 MG Oral Tab Take 1 tablet by mouth daily. 90 tablet 1 3/14/2025

## 2025-03-17 NOTE — PROGRESS NOTES
[facility]  part of Merged with [facility]    Progress Note    Francisco Del Rio Patient Status:  Inpatient    10/24/1945 MRN [id number]   Location [facility] 5SW/SE Attending Omayra Henao MD   Hosp Day # 1 PCP IRIS BOLAÑOS MD     Date of Admission:  3/15/2025      Subjective:   Pain improved, but still 8-9/10.  No emesis.  Afebrile    Current Medications:  Current Facility-Administered Medications   Medication Dose Route Frequency    ondansetron (Zofran) 4 MG/2ML injection 4 mg  4 mg Intravenous Q6H PRN    cefTRIAXone (Rocephin) 2 g in sodium chloride 0.9% 100 mL IVPB-ADDV  2 g Intravenous Q24H    metroNIDAZOLE in sodium chloride 0.79% (Flagyl) 5 mg/mL IVPB premix 500 mg  500 mg Intravenous Q12H    aspirin DR tab 81 mg  81 mg Oral Daily    lisinopril (Prinivil; Zestril) tab 5 mg  5 mg Oral Daily    pantoprazole (Protonix) DR tab 40 mg  40 mg Oral Daily with breakfast    insulin aspart (NovoLOG) 100 Units/mL FlexPen 1-5 Units  1-5 Units Subcutaneous TID CC    HYDROmorphone (Dilaudid) 1 MG/ML injection 0.5 mg  0.5 mg Intravenous Q3H PRN       ENDOSCOPIC REPORTS:   Colonoscopy:over 5 years ago  EGD: none  ERCP:    History of Prep or Sedation intolerance with previous endoscopic procedures: None    GENERAL: feels well otherwise.  SKIN: denies any unusual skin lesions  EYES: denies any new visual changes or double vision, denies red painful eyes  HEENT: denies any new hearing changes, nasal congestion, sinus pain  LUNGS: denies shortness of breath with exertion or persistent cough  CARDIOVASCULAR: denies chest pain on exertion  GI: as above  : denies dysuria, nocturia or changes in stream  MUSCULOSKELETAL: denies new myalgias, swelling  NEURO: denies new sensory or motor deficits.  PSYCHE: denies depression or anxiety  HEMATOLOGIC: denies bleeding or bruising    Physical Exam:   Blood pressure (!) 161/70, pulse 85, temperature 98.4 °F (36.9 °C), temperature source Oral, resp. rate 20, height 5'  6\" (1.676 m), weight 179 lb 3.2 oz (81.3 kg), SpO2 94%.    General appearance:  alert, appears stated age and cooperative    HEENT: negative findings: lids and lashes normal and conjunctivae and sclerae normal.     Cardiovascular: regular rate and rhythm    Abdominal: soft, bowel sounds present; +distended, +tender to palp in epigastric region w/o rebound nor guarding    Extremities: No edema, cyanosis, or clubbing    Skin: warm and dry    Neurologic: Grossly normal    Psychiatric: calm      Results:     Laboratory Data:  Lab Results   Component Value Date    WBC 18.6 (H) 03/17/2025    HGB 13.9 03/17/2025    HCT 40.4 03/17/2025    .0 03/17/2025    CREATSERUM 1.35 (H) 03/17/2025    BUN 22 03/17/2025    BUN 22 03/17/2025     03/17/2025    K 4.4 03/17/2025     03/17/2025    CO2 26.0 03/17/2025     (H) 03/17/2025    CA 8.6 (L) 03/17/2025    ALB 3.9 03/17/2025    ALKPHO 136 (H) 03/17/2025    TP 6.5 03/17/2025    AST 83 (H) 03/17/2025    ALT 93 (H) 03/17/2025    PTT 22.8 (L) 03/16/2025    INR 0.96 03/16/2025    PTP 13.4 03/16/2025    KILLIAN 724 (H) 03/17/2025     (HH) 03/17/2025    TROPHS 5 03/15/2025         Imaging:  CT ABDOMEN+PELVIS(CONTRAST ONLY)(CPT=74177)    Result Date: 3/16/2025  CONCLUSION:   1. Moderate to severe acute interstitial pancreatitis.  No discrete peripancreatic fluid collection is identified.  2. Gallbladder wall thickened may be reactive to pancreatitis.  No radiopaque gallstone is seen.  This is evaluated to advantage on subsequent right upper quadrant ultrasound and MRI/MRCP.  3. Questionable filling defect in the portal vein likely relates to venous admixture.  4. Pulmonary edema.  5. Infrarenal abdominal aortic aneurysm at penetrating ulcer measuring 3.1 cm.  6. Prostatomegaly.  7. Colonic diverticulosis. Additional chronic or incidental findings are described in the body of this report.      Preliminary report was given by [facility].  There are no  clinically significant discrepancies.    elm-remote     Dictated by (CST): Emilio Pretty MD on 3/16/2025 at 1:47 PM     Finalized by (CST): Emilio Pretty MD on 3/16/2025 at 1:53 PM          MRI ABDOMEN AND MRCP W/3D (CPT=74181/72860)    Result Date: 3/16/2025  CONCLUSION:   1. No gallstones.  No choledocholithiasis.  Gallbladder wall thickening and edema is probably reactive to adjacent pancreatitis, but acute cholecystitis is not excluded.  2. Severe acute interstitial pancreatitis.  No discrete peripancreatic fluid collections are seen.  Due to lack of intravenous contrast, the pancreas is not well evaluated for necrosis.  3. Small right pleural effusion.    elm-remote    Dictated by (CST): Emilio Pretty MD on 3/16/2025 at 8:56 AM     Finalized by (CST): Emilio Pretty MD on 3/16/2025 at 9:01 AM          US GALLBLADDER (CPT=76705)    Result Date: 3/16/2025  CONCLUSION:   Gallbladder mild distension and wall thickening/edema may indicate acute cholecystitis.  Note no gallstones are identified.  No biliary ductal dilatation.  Suboptimal evaluation of the portal vein is negative for portal venous thrombus.      Preliminary report was given by [facility].  There are no clinically significant discrepancies.    elm-remote     Dictated by (CST): Emilio Pretty MD on 3/16/2025 at 7:22 AM     Finalized by (CST): Emilio Pretty MD on 3/16/2025 at 7:25 AM              Impression:   1)Acute pancreatitis, unspecified complication status, suspect gallstone pancreatitis.  MRI/MRCP w/o choledocholithiasis.     -IV hydration: LR bolus of 20 mL/kg then 3 mL/kg/hour for 12 hours (81 kg)   -maintain NPO status until cleared by surgery to advance to clears.     - if no significant improvement in pain by tomorrow or worsening leukocytosis, consider repeating CT scan      2)Acute cholecystitis without choledocholithiasis  -iv antibiotics  -surgery consult, HIDA ordered    3)elevated ast/alt/alk phos - acute elevated in setting as  noted above (normal in 11/2024); ast> alt with alk phos > alt supporting cholestatic pattern  -serially improving.  Suspect passed gallstone.      4) ANDRES on CKD  - improving with LR for possible cholecystitis    5) Leukocytosis suspect multifactorial from cholecystitis and pancreatitis  - on IV Abx for       Thank you for allowing me to participate in the care of your patient.      Evelyn Leary DO  [facility]  Gastroenterology

## 2025-03-17 NOTE — CONSULTS
[facility]  part of [facility]    Report of Consultation    Francisco Del Rio Patient Status:  Inpatient    10/24/1945 MRN [id number]   Location [facility] 5SW/SE Attending Omayra Henao MD   Hosp Day # 1 PCP IRIS BOLAÑOS MD     Date of Admission:  3/15/2025  Date of Consult:  3/17/2025    Reason for Consultation:  Abd pain      History of Present Illness:  Francisco Del Rio is a a(n) 79 year old male. States 24 hx of epigastric pain   no n or v or change bowels   denies ETOH , or other meds   No trauma   Hx of DM     See pmh below    History:  Past Medical History:    Diabetes (HCC)    Esophageal reflux    Gastritis    High blood pressure    High cholesterol    Other and unspecified hyperlipidemia     History reviewed. No pertinent surgical history.  Family History   Problem Relation Age of Onset    Lipids Mother       reports that he has been smoking cigarettes. He has a 25 pack-year smoking history. He has never used smokeless tobacco. He reports that he does not currently use alcohol after a past usage of about 8.3 standard drinks of alcohol per week. He reports that he does not use drugs.    Allergies:  Allergies[1]    Medications:    Current Facility-Administered Medications:     ondansetron (Zofran) 4 MG/2ML injection 4 mg, 4 mg, Intravenous, Q6H PRN    cefTRIAXone (Rocephin) 2 g in sodium chloride 0.9% 100 mL IVPB-ADDV, 2 g, Intravenous, Q24H    metroNIDAZOLE in sodium chloride 0.79% (Flagyl) 5 mg/mL IVPB premix 500 mg, 500 mg, Intravenous, Q12H    aspirin DR tab 81 mg, 81 mg, Oral, Daily    lisinopril (Prinivil; Zestril) tab 5 mg, 5 mg, Oral, Daily    pantoprazole (Protonix) DR tab 40 mg, 40 mg, Oral, Daily with breakfast    insulin aspart (NovoLOG) 100 Units/mL FlexPen 1-5 Units, 1-5 Units, Subcutaneous, TID CC    HYDROmorphone (Dilaudid) 1 MG/ML injection 0.5 mg, 0.5 mg, Intravenous, Q3H PRN  Prescriptions Prior to Admission[2]    Review of Systems:  A ten point review  of systems was negative except as noted.    Physical Exam:  Blood pressure 137/75, pulse 81, temperature 99.5 °F (37.5 °C), temperature source Oral, resp. rate 21, height 5' 6\" (1.676 m), weight 179 lb 3.2 oz (81.3 kg), SpO2 95%.    General: Alert, orientated x3.  Cooperative.  No apparent distress.  Lungs: per attending  Cardiac: per attending  Abdomen:  Soft, min distention  tender epigastric area   Skin: Normal texture and turgor.. slightly dusky  Neurologic: per attending     Laboratory Data:  Lab Results   Component Value Date    WBC 18.6 (H) 03/17/2025    HGB 13.9 03/17/2025    HCT 40.4 03/17/2025    .0 03/17/2025    CREATSERUM 1.35 (H) 03/17/2025    BUN 22 03/17/2025    BUN 22 03/17/2025     03/17/2025    K 4.4 03/17/2025     03/17/2025    CO2 26.0 03/17/2025     (H) 03/17/2025    CA 8.6 (L) 03/17/2025    ALB 3.9 03/17/2025    ALKPHO 136 (H) 03/17/2025    BILT 0.8 03/17/2025    TP 6.5 03/17/2025    AST 83 (H) 03/17/2025    ALT 93 (H) 03/17/2025    PTT 22.8 (L) 03/16/2025    INR 0.96 03/16/2025    LIP >3,500 (H) 03/15/2025       Imaging:  CT ABDOMEN+PELVIS(CONTRAST ONLY)(CPT=74177)    Result Date: 3/16/2025  PROCEDURE: CT ABDOMEN + PELVIS (CONTRAST ONLY) (CPT=74177)  COMPARISON: None.  INDICATIONS: Abdominal pain,vomiting  TECHNIQUE: CT images of the abdomen and pelvis were obtained with non-ionic intravenous contrast material.  Automated exposure control for dose reduction was used. Adjustment of the mA and/or kV was done based on the patient's size. Use of iterative reconstruction technique for dose reduction was used.  Dose information is transmitted to the ACR (American College of Radiology) NRDR (National Radiology Data Registry) which includes the Dose Index Registry.  FINDINGS:  LOWER THORAX:  Pulmonary edema.  Cardiomegaly. LIVER:   No enlargement, atrophy, abnormal density, or significant focal lesion. GALLBLADDER: Gallbladder wall thickening.  No radiopaque gallstone.  BILIARY:   No visible dilatation or calcification.  PANCREAS:   Diffuse pancreatic edema at surrounding inflammatory changes.  No discrete fluid collection.  No ductal dilatation. SPLEEN:   No enlargement or focal lesion.  ADRENALS:   No mass or enlargement.  KIDNEYS:   No mass, obstruction, or calcification.  RETROPERITONEUM:   No mass or enlarged adenopathy.  Fluid is noted in the anterior pararenal space.  AORTA/VASCULAR:   Infrarenal abdominal aortic penetrating ulcer is seen with aneurysmal dilatation measuring up to 3.1 cm.  Questionable filling defect in the portal vein. PERITONEUM: No free fluid or air. GI TRACT/MESENTERY:    No visible mass, obstruction, or bowel wall thickening. Diverticulosis without evidence of acute diverticulitis. URINARY BLADDER: Unremarkable. REPRODUCTIVE ORGANS: Prostatomegaly measuring 7.2 x 5 x 4 x 6.4 cm. ABDOMINAL WALL:   No acute abnormality.  Fat containing umbilical hernia. BONES:  No acute fracture.  Moderate spondylosis.          CONCLUSION:   1. Moderate to severe acute interstitial pancreatitis.  No discrete peripancreatic fluid collection is identified.  2. Gallbladder wall thickened may be reactive to pancreatitis.  No radiopaque gallstone is seen.  This is evaluated to advantage on subsequent right upper quadrant ultrasound and MRI/MRCP.  3. Questionable filling defect in the portal vein likely relates to venous admixture.  4. Pulmonary edema.  5. Infrarenal abdominal aortic aneurysm at penetrating ulcer measuring 3.1 cm.  6. Prostatomegaly.  7. Colonic diverticulosis. Additional chronic or incidental findings are described in the body of this report.      Preliminary report was given by [facility].  There are no clinically significant discrepancies.    el-remote     Dictated by (CST): Emilio Pretty MD on 3/16/2025 at 1:47 PM     Finalized by (CST): Emilio Pretty MD on 3/16/2025 at 1:53 PM          MRI ABDOMEN AND MRCP W/3D (CPT=74181/46865)    Result Date:  3/16/2025  PROCEDURE: MRI ABDOMEN&MRCP W/3D (CPT=74181/91866)  MRCP   COMPARISON: [facility], CT ABDOMEN + PELVIS (CONTRAST ONLY) (CPT=74177), 3/16/2025, 0:05 AM  INDICATIONS: choledocholithiasis?  TECHNIQUE: A comprehensive examination was performed utilizing a variety of imaging planes and imaging parameters to optimize visualization of suspected pathology.  Images were obtained without contrast.    Magnetic resonance cholangiopancreatography was also performed.    FINDINGS:  LOWER THORAX: Small right pleural effusion.  Cardiomegaly.  LIVER: Normal size and morphology. No evidence of steatosis. No suspicious focal lesion.  GALLBLADDER: Gallbladder wall thickening and edema is noted.  No gallstones. BILIARY TREE: No intra or extrahepatic biliary ductal dilatation.  No filling defects.  PANCREAS: Extensive pancreatic edema peripancreatic fat stranding.  The pancreatic duct is not dilated.  No discrete fluid collection is identified. SPLEEN: Normal.  No enlargement or focal lesion.  ADRENALS: Normal.  KIDNEYS: Normal.  Bilateral renal cysts. RETROPERITONEUM: Extensive retroperitoneal edema extending down the anterior pararenal space. PERITONEUM: No ascites.  No enlarged lymph nodes. GI TRACT: No evidence of bowel obstruction. AORTA & MAJOR BRANCHES: Normal flow voids. VENOUS SYSTEM: Normal flow voids. ABDOMINAL WALL: Normal.  BONES: No acute fracture.  Mild spondylosis.          CONCLUSION:   1. No gallstones.  No choledocholithiasis.  Gallbladder wall thickening and edema is probably reactive to adjacent pancreatitis, but acute cholecystitis is not excluded.  2. Severe acute interstitial pancreatitis.  No discrete peripancreatic fluid collections are seen.  Due to lack of intravenous contrast, the pancreas is not well evaluated for necrosis.  3. Small right pleural effusion.    elm-remote    Dictated by (CST): Emilio Pretty MD on 3/16/2025 at 8:56 AM     Finalized by (CST): Emilio Pretty MD on  3/16/2025 at 9:01 AM          US GALLBLADDER (CPT=76705)    Result Date: 3/16/2025  PROCEDURE: US GALLBLADDER (CPT=76705)  COMPARISON: [facility], CT ABDOMEN + PELVIS (CONTRAST ONLY) (CPT=74177), 3/16/2025, 0:05 AM.  INDICATIONS: Portal veinous thrombosis? also eval for cholecystitis  TECHNIQUE:   The gallbladder was evaluated with grayscale ultrasound.   FINDINGS:   PANCREAS: Not well seen. LIVER:   Normal size, echotexture and color flow.  No masses. Color flow and Doppler imaging of portal and hepatic veins show patency and antegrade flow. BILE DUCTS:   No intrahepatic or extrahepatic biliary ductal dilatation.  Common bile duct measures 2 mm. GALLBLADDER: Gallbladder wall thickening and edema measuring 10 mm.  Mild gallbladder distention.  No gallstones are seen.  The sonographic King sign cannot be assessed due to prior pain medication administration.  RIGHT KIDNEY: The right kidney measures 7.5 cm long axis. There is no hydronephrosis.  OTHER:   No peritoneal free fluid.          CONCLUSION:   Gallbladder mild distension and wall thickening/edema may indicate acute cholecystitis.  Note no gallstones are identified.  No biliary ductal dilatation.  Suboptimal evaluation of the portal vein is negative for portal venous thrombus.      Preliminary report was given by [facility].  There are no clinically significant discrepancies.    [facility]-remote     Dictated by (CST): Emilio Pretty MD on 3/16/2025 at 7:22 AM     Finalized by (CST): Emilio Pretty MD on 3/16/2025 at 7:25 AM            Impression and Plan:  Patient Active Problem List   Diagnosis    Type II diabetes mellitus, uncontrolled    Hypercholesterolemia    Hypertension, benign    Tobacco use disorder    Acute renal failure    Herpes zoster    Pancreatitis (HCC)    Acute pancreatitis, unspecified complication status, unspecified pancreatitis type (HCC)    Acute cholecystitis   Increase in LFT's    TB 0.8  Lipase greater than 3500  Wbc  18.6   hgb 13.9  MRCP CT US   no cholelithiasis   some wall thickening   severe edematous pancreatitis  Main diagnosis is pancreatitis   denies any factors causing this   Though still possibility he passed a stone via ampulla  Advise bowel rest   clear liq only   Recheck enzymes  Hepato-biliary scan to see if GB fills though thickening may be reactive from pancreatitis  Also may need repeat CT soon to see status of pancreatitis      MARTÍN GONZALEZ MD  3/17/2025  9:08 AM       [1] No Known Allergies  [2]   Medications Prior to Admission   Medication Sig Dispense Refill Last Dose/Taking    simvastatin 40 MG Oral Tab Take 1 tablet (40 mg total) by mouth nightly. 30 tablet 3 3/14/2025    Pantoprazole Sodium 40 MG Oral Tab EC Take 1 tablet (40 mg total) by mouth once daily. 90 tablet 1 3/15/2025 Morning    lisinopril 10 MG Oral Tab Take 0.5 tablets (5 mg total) by mouth daily. 90 tablet 1 3/15/2025 Noon    MetFORMIN HCl 500 MG Oral Tab 1/2 tab PO q12 hrs. Pre meals. (Patient taking differently: Take 1 tablet (500 mg total) by mouth 2 (two) times daily with meals.) 90 tablet 1 3/15/2025 Morning    Glucose Blood In Vitro Strip Checks QAM Dx. 250.02. One touch ultra. 100 strip 6 Taking    Glucose Blood (FREESTYLE LITE TEST) In Vitro Strip Dx. 250.02. take by Misc.(Non-Drug; Combo Route) route twice a day 100 strip 6 Taking    FreeStyle Lancets Does not apply Misc as directed   Taking    Blood Glucose Monitoring Suppl (FREESTYLE LITE) Does not apply Device as directed   Taking    aspirin 81 MG Oral Tab Take 1 tablet by mouth daily. 90 tablet 1 3/14/2025

## 2025-03-17 NOTE — PLAN OF CARE
Problem: Patient Centered Care  Goal: Patient preferences are identified and integrated in the patient's plan of care  Description: Interventions:- What would you like us to know as we care for you? From home alone- Provide timely, complete, and accurate information to patient/family- Incorporate patient and family knowledge, values, beliefs, and cultural backgrounds into the planning and delivery of care- Encourage patient/family to participate in care and decision-making at the level they choose- Honor patient and family perspectives and choices  Outcome: Progressing     Problem: Diabetes/Glucose Control  Goal: Glucose maintained within prescribed range  Description: INTERVENTIONS:- Monitor Blood Glucose as ordered- Assess for signs and symptoms of hyperglycemia and hypoglycemia- Administer ordered medications to maintain glucose within target range- Assess barriers to adequate nutritional intake and initiate nutrition consult as needed- Instruct patient on self management of diabetes  Outcome: Progressing     Problem: Patient/Family Goals  Goal: Patient/Family Long Term Goal  Description: Patient's Long Term Goal: Discharge from the hospital   Interventions:  - Monitor vital signs  - Monitor appropriate labs  - Monitor blood glucose levels  - Pain management  - Administer medications per order  - Follow MD orders  - Diagnostics per order  - Update / inform patient and family on plan of care  - Discharge planning  - See additional Care Plan goals for specific interventions  Outcome: Progressing  Goal: Patient/Family Short Term Goal  Description: Patient's Short Term Goal: Improve abdominal pain and vomiting   Interventions:   - Monitor vital signs  - Monitor appropriate labs  - Monitor blood glucose levels  - Pain management  - Administer medications per order  - Follow MD orders  - Diagnostics per order  - Update / inform patient and family on plan of care- See additional Care Plan goals for specific  interventions  Outcome: Progressing     Problem: PAIN - ADULT  Goal: Verbalizes/displays adequate comfort level or patient's stated pain goal  Description: INTERVENTIONS:- Encourage pt to monitor pain and request assistance- Assess pain using appropriate pain scale- Administer analgesics based on type and severity of pain and evaluate response- Implement non-pharmacological measures as appropriate and evaluate response- Consider cultural and social influences on pain and pain management- Manage/alleviate anxiety- Utilize distraction and/or relaxation techniques- Monitor for opioid side effects- Notify MD/LIP if interventions unsuccessful or patient reports new pain- Anticipate increased pain with activity and pre-medicate as appropriate  Outcome: Progressing     Problem: SAFETY ADULT - FALL  Goal: Free from fall injury  Description: INTERVENTIONS:- Assess pt frequently for physical needs- Identify cognitive and physical deficits and behaviors that affect risk of falls.- [place] fall precautions as indicated by assessment.- Educate pt/family on patient safety including physical limitations- Instruct pt to call for assistance with activity based on assessment- Modify environment to reduce risk of injury- Provide assistive devices as appropriate- Consider OT/PT consult to assist with strengthening/mobility- Encourage toileting schedule  Outcome: Progressing     Problem: DISCHARGE PLANNING  Goal: Discharge to home or other facility with appropriate resources  Description: INTERVENTIONS:- Identify barriers to discharge w/pt and caregiver- Include patient/family/discharge partner in discharge planning- Arrange for needed discharge resources and transportation as appropriate- Identify discharge learning needs (meds, wound care, etc)- Arrange for interpreters to assist at discharge as needed- Consider post-discharge preferences of patient/family/discharge partner- Complete POLST form as appropriate- Assess patient's  ability to be responsible for managing their own health- Refer to Case Management Department for coordinating discharge planning if the patient needs post-hospital services based on physician/LIP order or complex needs related to functional status, cognitive ability or social support system  Outcome: Progressing     Problem: Altered Communication/Language Barrier  Goal: Patient/Family is able to understand and participate in their care  Description: Interventions:- Assess communication ability and preferred communication style- Implement communication aides and strategies- Use visual cues when possible- Listen attentively, be patient, do not interrupt- Minimize distractions- Allow time for understanding and response- Establish method for patient to ask for assistance (call light)- Provide an  as needed- Communicate barriers and strategies to overcome with those who interact with patient  Outcome: Progressing     Problem: GASTROINTESTINAL - ADULT  Goal: Minimal or absence of nausea and vomiting  Description: INTERVENTIONS:- Maintain adequate hydration with IV or PO as ordered and tolerated- Nasogastric tube to low intermittent suction as ordered- Evaluate effectiveness of ordered antiemetic medications- Provide nonpharmacologic comfort measures as appropriate- Advance diet as tolerated, if ordered- Obtain nutritional consult as needed- Evaluate fluid balance  Outcome: Progressing  Goal: Maintains or returns to baseline bowel function  Description: INTERVENTIONS:- Assess bowel function- Maintain adequate hydration with IV or PO as ordered and tolerated- Evaluate effectiveness of GI medications- Encourage mobilization and activity- Obtain nutritional consult as needed- Establish a toileting routine/schedule- Consider collaborating with pharmacy to review patient's medication profile  Outcome: Progressing     Problem: SKIN/TISSUE INTEGRITY - ADULT  Goal: Skin integrity remains intact  Description:  INTERVENTIONS- Assess and document risk factors for pressure ulcer development- Assess and document skin integrity- Monitor for areas of redness and/or skin breakdown- Initiate interventions, skin care algorithm/standards of care as needed  Outcome: Progressing

## 2025-03-18 LAB
ANION GAP SERPL CALC-SCNC: 4 MMOL/L (ref 0–18)
BUN BLD-MCNC: 22 MG/DL (ref 9–23)
BUN BLD-MCNC: 23 MG/DL (ref 9–23)
BUN BLD-MCNC: 23 MG/DL (ref 9–23)
BUN/CREAT SERPL: 18.5 (ref 10–20)
CALCIUM BLD-MCNC: 8.4 MG/DL (ref 8.7–10.4)
CHLORIDE SERPL-SCNC: 108 MMOL/L (ref 98–112)
CO2 SERPL-SCNC: 27 MMOL/L (ref 21–32)
CREAT BLD-MCNC: 1.24 MG/DL
DEPRECATED RDW RBC AUTO: 43.9 FL (ref 35.1–46.3)
EGFRCR SERPLBLD CKD-EPI 2021: 59 ML/MIN/1.73M2 (ref 60–?)
ERYTHROCYTE [DISTWIDTH] IN BLOOD BY AUTOMATED COUNT: 13.9 % (ref 11–15)
GLUCOSE BLD-MCNC: 126 MG/DL (ref 70–99)
GLUCOSE BLDC GLUCOMTR-MCNC: 113 MG/DL (ref 70–99)
GLUCOSE BLDC GLUCOMTR-MCNC: 123 MG/DL (ref 70–99)
GLUCOSE BLDC GLUCOMTR-MCNC: 129 MG/DL (ref 70–99)
GLUCOSE BLDC GLUCOMTR-MCNC: 133 MG/DL (ref 70–99)
HCT VFR BLD AUTO: 39.8 %
HGB BLD-MCNC: 13.3 G/DL
LIPASE SERPL-CCNC: 174 U/L (ref 12–53)
MCH RBC QN AUTO: 28.7 PG (ref 26–34)
MCHC RBC AUTO-ENTMCNC: 33.4 G/DL (ref 31–37)
MCV RBC AUTO: 85.8 FL
OSMOLALITY SERPL CALC.SUM OF ELEC: 293 MOSM/KG (ref 275–295)
PLATELET # BLD AUTO: 130 10(3)UL (ref 150–450)
POTASSIUM SERPL-SCNC: 4.1 MMOL/L (ref 3.5–5.1)
RBC # BLD AUTO: 4.64 X10(6)UL
SODIUM SERPL-SCNC: 139 MMOL/L (ref 136–145)
WBC # BLD AUTO: 16.3 X10(3) UL (ref 4–11)

## 2025-03-18 PROCEDURE — 85014 HEMATOCRIT: CPT | Performed by: INTERNAL MEDICINE

## 2025-03-18 PROCEDURE — 85018 HEMOGLOBIN: CPT | Performed by: INTERNAL MEDICINE

## 2025-03-18 PROCEDURE — 94760 N-INVAS EAR/PLS OXIMETRY 1: CPT

## 2025-03-18 PROCEDURE — 80048 BASIC METABOLIC PNL TOTAL CA: CPT | Performed by: INTERNAL MEDICINE

## 2025-03-18 PROCEDURE — 83690 ASSAY OF LIPASE: CPT | Performed by: INTERNAL MEDICINE

## 2025-03-18 PROCEDURE — 84520 ASSAY OF UREA NITROGEN: CPT | Performed by: INTERNAL MEDICINE

## 2025-03-18 PROCEDURE — 85027 COMPLETE CBC AUTOMATED: CPT | Performed by: INTERNAL MEDICINE

## 2025-03-18 PROCEDURE — 82962 GLUCOSE BLOOD TEST: CPT

## 2025-03-18 RX ORDER — SODIUM CHLORIDE, SODIUM LACTATE, POTASSIUM CHLORIDE, CALCIUM CHLORIDE 600; 310; 30; 20 MG/100ML; MG/100ML; MG/100ML; MG/100ML
INJECTION, SOLUTION INTRAVENOUS CONTINUOUS
Status: DISCONTINUED | OUTPATIENT
Start: 2025-03-18 | End: 2025-03-19

## 2025-03-18 RX ORDER — MAGNESIUM HYDROXIDE/ALUMINUM HYDROXICE/SIMETHICONE 120; 1200; 1200 MG/30ML; MG/30ML; MG/30ML
30 SUSPENSION ORAL 4 TIMES DAILY PRN
Status: DISCONTINUED | OUTPATIENT
Start: 2025-03-18 | End: 2025-03-21

## 2025-03-18 NOTE — PROGRESS NOTES
[facility]  part of [facility]    Progress Note    Francisco Del Rio Patient Status:  Inpatient    10/24/1945 MRN [id number]   Location [facility] 5SW/SE Attending Omayra Henao MD   Hosp Day # 2 PCP IRIS BOLAÑOS MD     Subjective:  Feels better     Objective/Physical Exam:  General: Alert, orientated x3.  Cooperative.  No apparent distress.  Vital Signs:  Blood pressure 146/60, pulse 88, temperature 97.5 °F (36.4 °C), temperature source Oral, resp. rate 16, height 5' 6\" (1.676 m), weight 179 lb 3.2 oz (81.3 kg), SpO2 93%.  Abdomen:  Soft, less distended   non tender   Labs:  Lab Results   Component Value Date    WBC 16.3 (H) 2025    HGB 13.3 2025    HCT 39.8 2025    .0 (L) 2025    CREATSERUM 1.24 2025    BUN 23 2025    BUN 23 2025     2025    K 4.1 2025     2025    CO2 27.0 2025     (H) 2025    CA 8.4 (L) 2025    ALB 3.9 2025    ALKPHO 136 (H) 2025    BILT 0.8 2025    TP 6.5 2025    AST 83 (H) 2025    ALT 93 (H) 2025    PTT 22.8 (L) 2025    INR 0.96 2025    KILLIAN 724 (H) 2025     (H) 2025       Imaging:  NM GB HEPATOBILIARY SCAN  (CPT=78226)    Result Date: 3/17/2025  PROCEDURE: NM GB HEPATOBILIARY SCAN (CPT=78226)  COMPARISON: [facility], MRI ABDOMEN AND MRCP W/3D (LUV=02951/83187), 3/16/2025, 7:45 AM.  INDICATIONS: pancreatitis   possible cholecystitis.  Follow-up MRCP.  TECHNIQUE: Hepatobiliary imaging was performed after the injection of 8.2 millicuries of Technetium-99m Choletec into the right and vein.   FINDINGS:   LIVER: There is normal uptake of radiotracer by the liver on the immediate and 5 minute images.  BILIARY DUCTS: Normal.  The central biliary structures are seen 10 minutes after tracer injection.  GALLBLADDER: Normal.  The gallbladder was visualized by the 40 minute image.   INTESTINE: Normal, with evidence of tracer by the 10 minute image.  This is confirmed on subsequent images.          CONCLUSION: Normal examination.     Dictated by (CST): Nilton Arreola MD on 3/17/2025 at 1:06 PM     Finalized by (CST): Nilton Arreola MD on 3/17/2025 at 1:09 PM          CT ABDOMEN+PELVIS(CONTRAST ONLY)(CPT=74177)    Result Date: 3/16/2025  PROCEDURE: CT ABDOMEN + PELVIS (CONTRAST ONLY) (CPT=74177)  COMPARISON: None.  INDICATIONS: Abdominal pain,vomiting  TECHNIQUE: CT images of the abdomen and pelvis were obtained with non-ionic intravenous contrast material.  Automated exposure control for dose reduction was used. Adjustment of the mA and/or kV was done based on the patient's size. Use of iterative reconstruction technique for dose reduction was used.  Dose information is transmitted to the ACR (American College of Radiology) NRDR (National Radiology Data Registry) which includes the Dose Index Registry.  FINDINGS:  LOWER THORAX:  Pulmonary edema.  Cardiomegaly. LIVER:   No enlargement, atrophy, abnormal density, or significant focal lesion. GALLBLADDER: Gallbladder wall thickening.  No radiopaque gallstone. BILIARY:   No visible dilatation or calcification.  PANCREAS:   Diffuse pancreatic edema at surrounding inflammatory changes.  No discrete fluid collection.  No ductal dilatation. SPLEEN:   No enlargement or focal lesion.  ADRENALS:   No mass or enlargement.  KIDNEYS:   No mass, obstruction, or calcification.  RETROPERITONEUM:   No mass or enlarged adenopathy.  Fluid is noted in the anterior pararenal space.  AORTA/VASCULAR:   Infrarenal abdominal aortic penetrating ulcer is seen with aneurysmal dilatation measuring up to 3.1 cm.  Questionable filling defect in the portal vein. PERITONEUM: No free fluid or air. GI TRACT/MESENTERY:    No visible mass, obstruction, or bowel wall thickening. Diverticulosis without evidence of acute diverticulitis. URINARY BLADDER: Unremarkable. REPRODUCTIVE  ORGANS: Prostatomegaly measuring 7.2 x 5 x 4 x 6.4 cm. ABDOMINAL WALL:   No acute abnormality.  Fat containing umbilical hernia. BONES:  No acute fracture.  Moderate spondylosis.          CONCLUSION:   1. Moderate to severe acute interstitial pancreatitis.  No discrete peripancreatic fluid collection is identified.  2. Gallbladder wall thickened may be reactive to pancreatitis.  No radiopaque gallstone is seen.  This is evaluated to advantage on subsequent right upper quadrant ultrasound and MRI/MRCP.  3. Questionable filling defect in the portal vein likely relates to venous admixture.  4. Pulmonary edema.  5. Infrarenal abdominal aortic aneurysm at penetrating ulcer measuring 3.1 cm.  6. Prostatomegaly.  7. Colonic diverticulosis. Additional chronic or incidental findings are described in the body of this report.      Preliminary report was given by [facility].  There are no clinically significant discrepancies.    [facility]     Dictated by (CST): Emilio Pretty MD on 3/16/2025 at 1:47 PM     Finalized by (CST): Emilio Pretty MD on 3/16/2025 at 1:53 PM          MRI ABDOMEN AND MRCP W/3D (CPT=74181/35780)    Result Date: 3/16/2025  PROCEDURE: MRI ABDOMEN&MRCP W/3D (CPT=74181/19858)  MRCP   COMPARISON: [facility], CT ABDOMEN + PELVIS (CONTRAST ONLY) (CPT=74177), 3/16/2025, 0:05 AM  INDICATIONS: choledocholithiasis?  TECHNIQUE: A comprehensive examination was performed utilizing a variety of imaging planes and imaging parameters to optimize visualization of suspected pathology.  Images were obtained without contrast.    Magnetic resonance cholangiopancreatography was also performed.    FINDINGS:  LOWER THORAX: Small right pleural effusion.  Cardiomegaly.  LIVER: Normal size and morphology. No evidence of steatosis. No suspicious focal lesion.  GALLBLADDER: Gallbladder wall thickening and edema is noted.  No gallstones. BILIARY TREE: No intra or extrahepatic biliary ductal dilatation.  No filling  defects.  PANCREAS: Extensive pancreatic edema peripancreatic fat stranding.  The pancreatic duct is not dilated.  No discrete fluid collection is identified. SPLEEN: Normal.  No enlargement or focal lesion.  ADRENALS: Normal.  KIDNEYS: Normal.  Bilateral renal cysts. RETROPERITONEUM: Extensive retroperitoneal edema extending down the anterior pararenal space. PERITONEUM: No ascites.  No enlarged lymph nodes. GI TRACT: No evidence of bowel obstruction. AORTA & MAJOR BRANCHES: Normal flow voids. VENOUS SYSTEM: Normal flow voids. ABDOMINAL WALL: Normal.  BONES: No acute fracture.  Mild spondylosis.          CONCLUSION:   1. No gallstones.  No choledocholithiasis.  Gallbladder wall thickening and edema is probably reactive to adjacent pancreatitis, but acute cholecystitis is not excluded.  2. Severe acute interstitial pancreatitis.  No discrete peripancreatic fluid collections are seen.  Due to lack of intravenous contrast, the pancreas is not well evaluated for necrosis.  3. Small right pleural effusion.    [facility]    Dictated by (CST): Emilio Pretty MD on 3/16/2025 at 8:56 AM     Finalized by (CST): Emilio Pretty MD on 3/16/2025 at 9:01 AM          US GALLBLADDER (CPT=76705)    Result Date: 3/16/2025  PROCEDURE: US GALLBLADDER (CPT=76705)  COMPARISON: [facility], CT ABDOMEN + PELVIS (CONTRAST ONLY) (CPT=74177), 3/16/2025, 0:05 AM.  INDICATIONS: Portal veinous thrombosis? also eval for cholecystitis  TECHNIQUE:   The gallbladder was evaluated with grayscale ultrasound.   FINDINGS:   PANCREAS: Not well seen. LIVER:   Normal size, echotexture and color flow.  No masses. Color flow and Doppler imaging of portal and hepatic veins show patency and antegrade flow. BILE DUCTS:   No intrahepatic or extrahepatic biliary ductal dilatation.  Common bile duct measures 2 mm. GALLBLADDER: Gallbladder wall thickening and edema measuring 10 mm.  Mild gallbladder distention.  No gallstones are seen.  The sonographic  King sign cannot be assessed due to prior pain medication administration.  RIGHT KIDNEY: The right kidney measures 7.5 cm long axis. There is no hydronephrosis.  OTHER:   No peritoneal free fluid.          CONCLUSION:   Gallbladder mild distension and wall thickening/edema may indicate acute cholecystitis.  Note no gallstones are identified.  No biliary ductal dilatation.  Suboptimal evaluation of the portal vein is negative for portal venous thrombus.      Preliminary report was given by [facility].  There are no clinically significant discrepancies.    [facility]     Dictated by (CST): Emilio Pretty MD on 3/16/2025 at 7:22 AM     Finalized by (CST): Emilio Pretty MD on 3/16/2025 at 7:25 AM            Assessment/Plan:  Patient Active Problem List   Diagnosis    Type II diabetes mellitus, uncontrolled    Hypercholesterolemia    Hypertension, benign    Tobacco use disorder    Acute renal failure    Herpes zoster    Pancreatitis (HCC)    Acute pancreatitis, unspecified complication status, unspecified pancreatitis type (HCC)    Acute cholecystitis   Wbc 16.3    Lipase 174/397/3500  Amylase 724  Resolving pancreatitis  Hepato-biliary neg for cholecystitis  No evidence of choledocholithiasis though may have passed stone  Some thickening of gallbladder may be reactive from pancreatitis  No cholecystectomy advised  Could repeat US  and CT as outpt in several weeks to see status but would obs for resolution of pancreatitis (ie  pseudocyst)        MARTÍN GONZALEZ MD  3/18/2025  9:46 AM

## 2025-03-18 NOTE — PLAN OF CARE
Problem: Patient Centered Care  Goal: Patient preferences are identified and integrated in the patient's plan of care  Description: Interventions:- What would you like us to know as we care for you? From home alone- Provide timely, complete, and accurate information to patient/family- Incorporate patient and family knowledge, values, beliefs, and cultural backgrounds into the planning and delivery of care- Encourage patient/family to participate in care and decision-making at the level they choose- Honor patient and family perspectives and choices  Outcome: Progressing     Problem: Diabetes/Glucose Control  Goal: Glucose maintained within prescribed range  Description: INTERVENTIONS:- Monitor Blood Glucose as ordered- Assess for signs and symptoms of hyperglycemia and hypoglycemia- Administer ordered medications to maintain glucose within target range- Assess barriers to adequate nutritional intake and initiate nutrition consult as needed- Instruct patient on self management of diabetes  Outcome: Progressing     Problem: Patient/Family Goals  Goal: Patient/Family Long Term Goal  Description: Patient's Long Term Goal: Discharge from the hospital   Interventions:  - Monitor vital signs  - Monitor appropriate labs  - Monitor blood glucose levels  - Pain management  - Administer medications per order  - Follow MD orders  - Diagnostics per order  - Update / inform patient and family on plan of care  - Discharge planning  - See additional Care Plan goals for specific interventions  Outcome: Progressing  Goal: Patient/Family Short Term Goal  Description: Patient's Short Term Goal: Improve abdominal pain and vomiting   Interventions:   - Monitor vital signs  - Monitor appropriate labs  - Monitor blood glucose levels  - Pain management  - Administer medications per order  - Follow MD orders  - Diagnostics per order  - Update / inform patient and family on plan of care- See additional Care Plan goals for specific  interventions  Outcome: Progressing     Problem: PAIN - ADULT  Goal: Verbalizes/displays adequate comfort level or patient's stated pain goal  Description: INTERVENTIONS:- Encourage pt to monitor pain and request assistance- Assess pain using appropriate pain scale- Administer analgesics based on type and severity of pain and evaluate response- Implement non-pharmacological measures as appropriate and evaluate response- Consider cultural and social influences on pain and pain management- Manage/alleviate anxiety- Utilize distraction and/or relaxation techniques- Monitor for opioid side effects- Notify MD/LIP if interventions unsuccessful or patient reports new pain- Anticipate increased pain with activity and pre-medicate as appropriate  Outcome: Progressing     Problem: SAFETY ADULT - FALL  Goal: Free from fall injury  Description: INTERVENTIONS:- Assess pt frequently for physical needs- Identify cognitive and physical deficits and behaviors that affect risk of falls.- [place] fall precautions as indicated by assessment.- Educate pt/family on patient safety including physical limitations- Instruct pt to call for assistance with activity based on assessment- Modify environment to reduce risk of injury- Provide assistive devices as appropriate- Consider OT/PT consult to assist with strengthening/mobility- Encourage toileting schedule  Outcome: Progressing     Problem: DISCHARGE PLANNING  Goal: Discharge to home or other facility with appropriate resources  Description: INTERVENTIONS:- Identify barriers to discharge w/pt and caregiver- Include patient/family/discharge partner in discharge planning- Arrange for needed discharge resources and transportation as appropriate- Identify discharge learning needs (meds, wound care, etc)- Arrange for interpreters to assist at discharge as needed- Consider post-discharge preferences of patient/family/discharge partner- Complete POLST form as appropriate- Assess patient's  ability to be responsible for managing their own health- Refer to Case Management Department for coordinating discharge planning if the patient needs post-hospital services based on physician/LIP order or complex needs related to functional status, cognitive ability or social support system  Outcome: Progressing     Problem: Altered Communication/Language Barrier  Goal: Patient/Family is able to understand and participate in their care  Description: Interventions:- Assess communication ability and preferred communication style- Implement communication aides and strategies- Use visual cues when possible- Listen attentively, be patient, do not interrupt- Minimize distractions- Allow time for understanding and response- Establish method for patient to ask for assistance (call light)- Provide an  as needed- Communicate barriers and strategies to overcome with those who interact with patient  Outcome: Progressing     Problem: GASTROINTESTINAL - ADULT  Goal: Minimal or absence of nausea and vomiting  Description: INTERVENTIONS:- Maintain adequate hydration with IV or PO as ordered and tolerated- Nasogastric tube to low intermittent suction as ordered- Evaluate effectiveness of ordered antiemetic medications- Provide nonpharmacologic comfort measures as appropriate- Advance diet as tolerated, if ordered- Obtain nutritional consult as needed- Evaluate fluid balance  Outcome: Progressing  Goal: Maintains or returns to baseline bowel function  Description: INTERVENTIONS:- Assess bowel function- Maintain adequate hydration with IV or PO as ordered and tolerated- Evaluate effectiveness of GI medications- Encourage mobilization and activity- Obtain nutritional consult as needed- Establish a toileting routine/schedule- Consider collaborating with pharmacy to review patient's medication profile  Outcome: Progressing     Problem: SKIN/TISSUE INTEGRITY - ADULT  Goal: Skin integrity remains intact  Description:  INTERVENTIONS- Assess and document risk factors for pressure ulcer development- Assess and document skin integrity- Monitor for areas of redness and/or skin breakdown- Initiate interventions, skin care algorithm/standards of care as needed  Outcome: Progressing

## 2025-03-18 NOTE — PROGRESS NOTES
[facility]  part of [facility]    Progress Note    Francisco Del Rio Patient Status:  Inpatient    10/24/1945 MRN [id number]   Location [facility] 5SW/SE Attending Omayra Henao MD   Hosp Day # 2 PCP IRIS BOLAÑOS MD     Date of Admission:  3/15/2025      Subjective:   Pain improved. No n/v.  HIDA normal. Tolerating CLD    Current Medications:  Current Facility-Administered Medications   Medication Dose Route Frequency    HYDROcodone-acetaminophen (Norco) 5-325 MG per tab 1 tablet  1 tablet Oral Q6H PRN    ondansetron (Zofran) 4 MG/2ML injection 4 mg  4 mg Intravenous Q6H PRN    cefTRIAXone (Rocephin) 2 g in sodium chloride 0.9% 100 mL IVPB-ADDV  2 g Intravenous Q24H    metroNIDAZOLE in sodium chloride 0.79% (Flagyl) 5 mg/mL IVPB premix 500 mg  500 mg Intravenous Q12H    aspirin DR tab 81 mg  81 mg Oral Daily    lisinopril (Prinivil; Zestril) tab 5 mg  5 mg Oral Daily    pantoprazole (Protonix) DR tab 40 mg  40 mg Oral Daily with breakfast    insulin aspart (NovoLOG) 100 Units/mL FlexPen 1-5 Units  1-5 Units Subcutaneous TID CC    HYDROmorphone (Dilaudid) 1 MG/ML injection 0.5 mg  0.5 mg Intravenous Q3H PRN       ENDOSCOPIC REPORTS:   Colonoscopy:over 5 years ago  EGD: none  ERCP:    History of Prep or Sedation intolerance with previous endoscopic procedures: None    GENERAL: feels well otherwise.  SKIN: denies any unusual skin lesions  EYES: denies any new visual changes or double vision, denies red painful eyes  HEENT: denies any new hearing changes, nasal congestion, sinus pain  LUNGS: denies shortness of breath with exertion or persistent cough  CARDIOVASCULAR: denies chest pain on exertion  GI: as above  : denies dysuria, nocturia or changes in stream  MUSCULOSKELETAL: denies new myalgias, swelling  NEURO: denies new sensory or motor deficits.  PSYCHE: denies depression or anxiety  HEMATOLOGIC: denies bleeding or bruising    Physical Exam:   Blood pressure 146/60, pulse 88,  temperature 97.5 °F (36.4 °C), temperature source Oral, resp. rate 16, height 5' 6\" (1.676 m), weight 179 lb 3.2 oz (81.3 kg), SpO2 93%.    General appearance:  alert, appears stated age and cooperative    HEENT: negative findings: lids and lashes normal and conjunctivae and sclerae normal.     Cardiovascular: regular rate and rhythm    Abdominal: soft, bowel sounds present; +distended, +tender to palp in epigastric region w/o rebound nor guarding    Extremities: No edema, cyanosis, or clubbing    Skin: warm and dry    Neurologic: Grossly normal    Psychiatric: calm      Results:     Laboratory Data:  Lab Results   Component Value Date    WBC 16.3 (H) 03/18/2025    HGB 13.3 03/18/2025    HCT 39.8 03/18/2025    .0 (L) 03/18/2025    CREATSERUM 1.24 03/18/2025    BUN 23 03/18/2025    BUN 23 03/18/2025     03/18/2025    K 4.1 03/18/2025     03/18/2025    CO2 27.0 03/18/2025     (H) 03/18/2025    CA 8.4 (L) 03/18/2025    ALB 3.9 03/17/2025    ALKPHO 136 (H) 03/17/2025    TP 6.5 03/17/2025    AST 83 (H) 03/17/2025    ALT 93 (H) 03/17/2025    PTT 22.8 (L) 03/16/2025    INR 0.96 03/16/2025    PTP 13.4 03/16/2025    KILLIAN 724 (H) 03/17/2025     (H) 03/18/2025    TROPHS 5 03/15/2025         Imaging:  NM GB HEPATOBILIARY SCAN  (CPT=78226)    Result Date: 3/17/2025  CONCLUSION: Normal examination.     Dictated by (CST): Nilton Arreola MD on 3/17/2025 at 1:06 PM     Finalized by (CST): Nilton Arreola MD on 3/17/2025 at 1:09 PM              Impression:   1)Acute pancreatitis, unspecified complication status, suspect gallstone pancreatitis.  MRI/MRCP w/o choledocholithiasis.     -IV hydration: okay to back off LR to 100cc/h as diet advanced   -okay to advance diet as tolerated to low fat diet   - surgery on consult.  HIDA negative for cholecystitis. No plan by surgery for herber  - consider stopping Abx without e/o cholecystitis.  No cross sectional imaging to suggest pancreatitis with necrosis.         3)elevated ast/alt/alk phos - acute elevated in setting as noted above (normal in 11/2024); ast> alt with alk phos > alt supporting cholestatic pattern  -serially improving.  Suspect passed gallstone.      4) ANDRES on CKD  - improving with LR    5) Leukocytosis suspect reactive from pancreatitis  - consider stopping Abx w/o e/o cholecystitis      Thank you for allowing me to participate in the care of your patient.      Evelyn Leary DO  [facility]  Gastroenterology

## 2025-03-18 NOTE — PLAN OF CARE
Problem: Patient Centered Care  Goal: Patient preferences are identified and integrated in the patient's plan of care  Description: Interventions:- What would you like us to know as we care for you? From home alone- Provide timely, complete, and accurate information to patient/family- Incorporate patient and family knowledge, values, beliefs, and cultural backgrounds into the planning and delivery of care- Encourage patient/family to participate in care and decision-making at the level they choose- Honor patient and family perspectives and choices  Outcome: Progressing     Problem: Diabetes/Glucose Control  Goal: Glucose maintained within prescribed range  Description: INTERVENTIONS:- Monitor Blood Glucose as ordered- Assess for signs and symptoms of hyperglycemia and hypoglycemia- Administer ordered medications to maintain glucose within target range- Assess barriers to adequate nutritional intake and initiate nutrition consult as needed- Instruct patient on self management of diabetes  Outcome: Progressing     Problem: Patient/Family Goals  Goal: Patient/Family Long Term Goal  Description: Patient's Long Term Goal: Discharge from the hospital   Interventions:  - Monitor vital signs  - Monitor appropriate labs  - Monitor blood glucose levels  - Pain management  - Administer medications per order  - Follow MD orders  - Diagnostics per order  - Update / inform patient and family on plan of care  - Discharge planning  - See additional Care Plan goals for specific interventions  Outcome: Progressing  Goal: Patient/Family Short Term Goal  Description: Patient's Short Term Goal: Improve abdominal pain and vomiting   Interventions:   - Monitor vital signs  - Monitor appropriate labs  - Monitor blood glucose levels  - Pain management  - Administer medications per order  - Follow MD orders  - Diagnostics per order  - Update / inform patient and family on plan of care- See additional Care Plan goals for specific  interventions  Outcome: Progressing     Problem: PAIN - ADULT  Goal: Verbalizes/displays adequate comfort level or patient's stated pain goal  Description: INTERVENTIONS:- Encourage pt to monitor pain and request assistance- Assess pain using appropriate pain scale- Administer analgesics based on type and severity of pain and evaluate response- Implement non-pharmacological measures as appropriate and evaluate response- Consider cultural and social influences on pain and pain management- Manage/alleviate anxiety- Utilize distraction and/or relaxation techniques- Monitor for opioid side effects- Notify MD/LIP if interventions unsuccessful or patient reports new pain- Anticipate increased pain with activity and pre-medicate as appropriate  Outcome: Progressing     Problem: SAFETY ADULT - FALL  Goal: Free from fall injury  Description: INTERVENTIONS:- Assess pt frequently for physical needs- Identify cognitive and physical deficits and behaviors that affect risk of falls.- [place] fall precautions as indicated by assessment.- Educate pt/family on patient safety including physical limitations- Instruct pt to call for assistance with activity based on assessment- Modify environment to reduce risk of injury- Provide assistive devices as appropriate- Consider OT/PT consult to assist with strengthening/mobility- Encourage toileting schedule  Outcome: Progressing     Problem: DISCHARGE PLANNING  Goal: Discharge to home or other facility with appropriate resources  Description: INTERVENTIONS:- Identify barriers to discharge w/pt and caregiver- Include patient/family/discharge partner in discharge planning- Arrange for needed discharge resources and transportation as appropriate- Identify discharge learning needs (meds, wound care, etc)- Arrange for interpreters to assist at discharge as needed- Consider post-discharge preferences of patient/family/discharge partner- Complete POLST form as appropriate- Assess patient's  ability to be responsible for managing their own health- Refer to Case Management Department for coordinating discharge planning if the patient needs post-hospital services based on physician/LIP order or complex needs related to functional status, cognitive ability or social support system  Outcome: Progressing     Problem: Altered Communication/Language Barrier  Goal: Patient/Family is able to understand and participate in their care  Description: Interventions:- Assess communication ability and preferred communication style- Implement communication aides and strategies- Use visual cues when possible- Listen attentively, be patient, do not interrupt- Minimize distractions- Allow time for understanding and response- Establish method for patient to ask for assistance (call light)- Provide an  as needed- Communicate barriers and strategies to overcome with those who interact with patient  Outcome: Progressing     Problem: GASTROINTESTINAL - ADULT  Goal: Minimal or absence of nausea and vomiting  Description: INTERVENTIONS:- Maintain adequate hydration with IV or PO as ordered and tolerated- Nasogastric tube to low intermittent suction as ordered- Evaluate effectiveness of ordered antiemetic medications- Provide nonpharmacologic comfort measures as appropriate- Advance diet as tolerated, if ordered- Obtain nutritional consult as needed- Evaluate fluid balance  Outcome: Progressing  Goal: Maintains or returns to baseline bowel function  Description: INTERVENTIONS:- Assess bowel function- Maintain adequate hydration with IV or PO as ordered and tolerated- Evaluate effectiveness of GI medications- Encourage mobilization and activity- Obtain nutritional consult as needed- Establish a toileting routine/schedule- Consider collaborating with pharmacy to review patient's medication profile  Outcome: Progressing     Problem: SKIN/TISSUE INTEGRITY - ADULT  Goal: Skin integrity remains intact  Description:  INTERVENTIONS- Assess and document risk factors for pressure ulcer development- Assess and document skin integrity- Monitor for areas of redness and/or skin breakdown- Initiate interventions, skin care algorithm/standards of care as needed  Outcome: Progressing

## 2025-03-18 NOTE — PROGRESS NOTES
[facility]  part of [facility]    Progress Note    Francisco Del Rio Patient Status:  Inpatient    10/24/1945 MRN [id number]   Location [facility] 5SW/SE Attending Omayra Henao MD   Hosp Day # 2 PCP IRIS BOLAÑOS MD     Chief Complaint: Abd pain    Subjective:     Constitutional: Negative.    HENT: Negative.     Eyes: Negative.    Respiratory: Negative.     Cardiovascular: Negative.    Gastrointestinal:  Positive for abdominal pain.   Endocrine: Negative.    Musculoskeletal: Negative.    Allergic/Immunologic: Negative.    Neurological: Negative.    Psychiatric/Behavioral: Negative.         Objective:   Blood pressure 147/76, pulse 86, temperature 98.6 °F (37 °C), temperature source Oral, resp. rate 18, height 5' 6\" (1.676 m), weight 179 lb 3.2 oz (81.3 kg), SpO2 93%.  Physical Exam  Vitals and nursing note reviewed.   HENT:      Head: Normocephalic and atraumatic.   Eyes:      Pupils: Pupils are equal, round, and reactive to light.   Cardiovascular:      Rate and Rhythm: Normal rate and regular rhythm.      Pulses: Normal pulses.      Heart sounds: Normal heart sounds.   Pulmonary:      Effort: Pulmonary effort is normal.      Breath sounds: Normal breath sounds.   Abdominal:      General: Abdomen is flat. Bowel sounds are normal.      Palpations: Abdomen is soft.   Musculoskeletal:      Cervical back: Neck supple.   Skin:     General: Skin is warm.   Neurological:      General: No focal deficit present.      Mental Status: He is alert.   Psychiatric:         Mood and Affect: Mood normal.         Results:   Lab Results   Component Value Date    WBC 16.3 (H) 2025    HGB 13.3 2025    HCT 39.8 2025    .0 (L) 2025    CREATSERUM 1.24 2025    BUN 23 2025    BUN 23 2025     2025    K 4.1 2025     2025    CO2 27.0 2025     (H) 2025    CA 8.4 (L) 2025    ALB 3.9 2025    ALKPHO 136  (H) 03/17/2025    BILT 0.8 03/17/2025    TP 6.5 03/17/2025    AST 83 (H) 03/17/2025    ALT 93 (H) 03/17/2025    PTT 22.8 (L) 03/16/2025    INR 0.96 03/16/2025    KILLIAN 724 (H) 03/17/2025     (H) 03/18/2025    TROPHS 5 03/15/2025       NM GB HEPATOBILIARY SCAN  (CPT=78226)    Result Date: 3/17/2025  CONCLUSION: Normal examination.     Dictated by (CST): Nilton Arreola MD on 3/17/2025 at 1:06 PM     Finalized by (CST): Nilton Arreola MD on 3/17/2025 at 1:09 PM               Assessment & Plan:       Acute pancreatitis, unspecified complication status, unspecified pancreatitis type (HCC)  NPO, IV fluids ,HIDA scan today, negative    Acute cholecystitis , started on clear liquids  Pain control  HTN BP stable  HLD on statin  DM monitor   On Rocephin and Flagyl  D/W son at bedside  Time spent 35 mins

## 2025-03-18 NOTE — PLAN OF CARE
Problem: Patient Centered Care  Goal: Patient preferences are identified and integrated in the patient's plan of care  Description: Interventions:- What would you like us to know as we care for you? From home alone-   Provide timely, complete, and accurate information to patient/family- Incorporate patient and family knowledge, values, beliefs, and cultural backgrounds into the planning and delivery of care- Encourage patient/family to participate in care and decision-making at the level they choose- Honor patient and family perspectives and choices  Outcome: Progressing     Problem: Diabetes/Glucose Control  Goal: Glucose maintained within prescribed range  Description: INTERVENTIONS:- Monitor Blood Glucose as ordered- Assess for signs and symptoms of hyperglycemia and hypoglycemia- Administer ordered medications to maintain glucose within target range- Assess barriers to adequate nutritional intake and initiate nutrition consult as needed- Instruct patient on self management of diabetes  Outcome: Progressing     Problem: Patient/Family Goals  Goal: Patient/Family Long Term Goal  Description: Patient's Long Term Goal: Discharge from the hospital   Interventions:  - Monitor vital signs  - Monitor appropriate labs  - Monitor blood glucose levels  - Pain management  - Administer medications per order  - Follow MD orders  - Diagnostics per order  - Update / inform patient and family on plan of care  - Discharge planning  - See additional Care Plan goals for specific interventions  Outcome: Progressing  Goal: Patient/Family Short Term Goal  Description: Patient's Short Term Goal: Improve abdominal pain and vomiting   Interventions:   - Monitor vital signs  - Monitor appropriate labs  - Monitor blood glucose levels  - Pain management  - Administer medications per order  - Follow MD orders  - Diagnostics per order  - Update / inform patient and family on plan of care- See additional Care Plan goals for specific  interventions  Outcome: Progressing     Problem: PAIN - ADULT  Goal: Verbalizes/displays adequate comfort level or patient's stated pain goal  Description: INTERVENTIONS:- Encourage pt to monitor pain and request assistance- Assess pain using appropriate pain scale- Administer analgesics based on type and severity of pain and evaluate response- Implement non-pharmacological measures as appropriate and evaluate response- Consider cultural and social influences on pain and pain management- Manage/alleviate anxiety- Utilize distraction and/or relaxation techniques- Monitor for opioid side effects- Notify MD/LIP if interventions unsuccessful or patient reports new pain- Anticipate increased pain with activity and pre-medicate as appropriate  Outcome: Progressing     Problem: SAFETY ADULT - FALL  Goal: Free from fall injury  Description: INTERVENTIONS:- Assess pt frequently for physical needs- Identify cognitive and physical deficits and behaviors that affect risk of falls.- [place] fall precautions as indicated by assessment.- Educate pt/family on patient safety including physical limitations- Instruct pt to call for assistance with activity based on assessment- Modify environment to reduce risk of injury- Provide assistive devices as appropriate- Consider OT/PT consult to assist with strengthening/mobility- Encourage toileting schedule  Outcome: Progressing     Problem: DISCHARGE PLANNING  Goal: Discharge to home or other facility with appropriate resources  Description: INTERVENTIONS:- Identify barriers to discharge w/pt and caregiver- Include patient/family/discharge partner in discharge planning- Arrange for needed discharge resources and transportation as appropriate- Identify discharge learning needs (meds, wound care, etc)- Arrange for interpreters to assist at discharge as needed- Consider post-discharge preferences of patient/family/discharge partner- Complete POLST form as appropriate- Assess patient's  ability to be responsible for managing their own health- Refer to Case Management Department for coordinating discharge planning if the patient needs post-hospital services based on physician/LIP order or complex needs related to functional status, cognitive ability or social support system  Outcome: Progressing     Problem: Altered Communication/Language Barrier  Goal: Patient/Family is able to understand and participate in their care  Description: Interventions:- Assess communication ability and preferred communication style- Implement communication aides and strategies- Use visual cues when possible- Listen attentively, be patient, do not interrupt- Minimize distractions- Allow time for understanding and response- Establish method for patient to ask for assistance (call light)- Provide an  as needed- Communicate barriers and strategies to overcome with those who interact with patient  Outcome: Progressing     Problem: GASTROINTESTINAL - ADULT  Goal: Minimal or absence of nausea and vomiting  Description: INTERVENTIONS:- Maintain adequate hydration with IV or PO as ordered and tolerated- Nasogastric tube to low intermittent suction as ordered- Evaluate effectiveness of ordered antiemetic medications- Provide nonpharmacologic comfort measures as appropriate- Advance diet as tolerated, if ordered- Obtain nutritional consult as needed- Evaluate fluid balance  Outcome: Progressing  Goal: Maintains or returns to baseline bowel function  Description: INTERVENTIONS:- Assess bowel function- Maintain adequate hydration with IV or PO as ordered and tolerated- Evaluate effectiveness of GI medications- Encourage mobilization and activity- Obtain nutritional consult as needed- Establish a toileting routine/schedule- Consider collaborating with pharmacy to review patient's medication profile  Outcome: Progressing     Problem: SKIN/TISSUE INTEGRITY - ADULT  Goal: Skin integrity remains intact  Description:  INTERVENTIONS- Assess and document risk factors for pressure ulcer development- Assess and document skin integrity- Monitor for areas of redness and/or skin breakdown- Initiate interventions, skin care algorithm/standards of care as needed  Outcome: Progressing

## 2025-03-19 LAB
ALBUMIN SERPL-MCNC: 3.6 G/DL (ref 3.2–4.8)
ALP LIVER SERPL-CCNC: 89 U/L
ALT SERPL-CCNC: 33 U/L
AMYLASE SERPL-CCNC: 123 U/L (ref 30–118)
AST SERPL-CCNC: 25 U/L (ref ?–34)
BILIRUB DIRECT SERPL-MCNC: 0.3 MG/DL (ref ?–0.3)
BILIRUB SERPL-MCNC: 0.7 MG/DL (ref 0.2–1.1)
GLUCOSE BLDC GLUCOMTR-MCNC: 111 MG/DL (ref 70–99)
GLUCOSE BLDC GLUCOMTR-MCNC: 137 MG/DL (ref 70–99)
GLUCOSE BLDC GLUCOMTR-MCNC: 140 MG/DL (ref 70–99)
GLUCOSE BLDC GLUCOMTR-MCNC: 179 MG/DL (ref 70–99)
LIPASE SERPL-CCNC: 79 U/L (ref 12–53)
PROT SERPL-MCNC: 6.1 G/DL (ref 5.7–8.2)

## 2025-03-19 PROCEDURE — 82962 GLUCOSE BLOOD TEST: CPT

## 2025-03-19 PROCEDURE — 83690 ASSAY OF LIPASE: CPT | Performed by: SPECIALIST

## 2025-03-19 PROCEDURE — 80076 HEPATIC FUNCTION PANEL: CPT | Performed by: SPECIALIST

## 2025-03-19 PROCEDURE — 82150 ASSAY OF AMYLASE: CPT | Performed by: SPECIALIST

## 2025-03-19 NOTE — CM/SW NOTE
03/19/25 1600   CM/SW Referral Data   Referral Source Social Work (self-referral)   Reason for Referral Discharge planning   Informant Son;EMR;Clinical Staff Member   Medical Hx   Does patient have an established PCP? Yes  (Dr. Tyler Frye)   Significant Past Medical/Mental Health Hx DMll   Patient Info   Advanced directives? No   Patient's Current Mental Status at Time of Assessment Alert;Oriented   Patient's Home Environment Condo/Apt no elevator   Number of Levels in Home 1   Number of Stair in Home 5 [place]   Patient lives with Alone   Patient Status Prior to Admission   Independent with ADLs and Mobility Yes   Discharge Needs   Anticipated D/C needs Home health care;Home delivered meals   Choice of Post-Acute Provider   Informed patient of right to choose their preferred provider Yes     SW self-referred to this case for discharge planning.    Pt admitted for acute pancreatitis.  Conservation management- plan per general surgery to advance diet as tolerated.    PT/OT on consult for baseline eval.    QUIN was asked by PARTH Lindo to call pt's son Leandro to complete assessment.    QUIN confirmed address and PCP on file.    Pt is from home alone in an apartment- 5 stairs to his unit.  Pt is independent with mobility and ADLs at baseline- works part-time for a [employer].    Leandro confirmed pt is a diabetic- compliant with insulin.    Leandro interested in home health care post discharge as pt has been weaker since illness.    [facility] referrals sent in Aidin.  Face to face entered/uploaded.    QUIN explained service/right to chose provider.  Leandro agreeable to first accepting provider that can provide Syrian-speaking staff.    Leandro asked for referral to meals on Wheels- QUIN explained this is through [facility].  Kathy from [facility] sent secure chat with pt info.    Plan is for possible DC tomorrow 3/20.    PLAN: DC home w/home health pending list/choice/med clear    / to remain available for  support and/or discharge planning.     Gwendolyn PERES, LSW  Discharge Planner

## 2025-03-19 NOTE — PROGRESS NOTES
[facility]  part of [facility]    Progress Note    Francisco Del Rio Patient Status:  Inpatient    10/24/1945 MRN [id number]   Location [facility] 5SW/SE Attending Omayra Henao MD   Hosp Day # 3 PCP IRIS BOLAÑOS MD     Chief Complaint: Abd pain    Subjective:     Constitutional: Negative.    HENT: Negative.     Eyes: Negative.    Respiratory: Negative.     Cardiovascular: Negative.    Gastrointestinal: Negative.  Negative for abdominal pain.   Endocrine: Negative.    Musculoskeletal: Negative.    Allergic/Immunologic: Negative.    Neurological: Negative.    Hematological: Negative.    Psychiatric/Behavioral: Negative.         Objective:   Blood pressure 145/67, pulse 67, temperature 98.4 °F (36.9 °C), temperature source Oral, resp. rate 18, height 5' 6\" (1.676 m), weight 179 lb 3.2 oz (81.3 kg), SpO2 95%.  Physical Exam  Vitals and nursing note reviewed.   HENT:      Head: Normocephalic and atraumatic.   Eyes:      Pupils: Pupils are equal, round, and reactive to light.   Cardiovascular:      Rate and Rhythm: Normal rate and regular rhythm.      Pulses: Normal pulses.      Heart sounds: Normal heart sounds.   Pulmonary:      Effort: Pulmonary effort is normal.      Breath sounds: Normal breath sounds.   Abdominal:      General: Abdomen is flat. Bowel sounds are normal.      Palpations: Abdomen is soft.   Musculoskeletal:      Cervical back: Neck supple.   Skin:     General: Skin is warm.   Neurological:      General: No focal deficit present.      Mental Status: He is alert.   Psychiatric:         Mood and Affect: Mood normal.         Results:   Lab Results   Component Value Date    WBC 16.3 (H) 2025    HGB 13.3 2025    HCT 39.8 2025    .0 (L) 2025    CREATSERUM 1.24 2025    BUN 23 2025    BUN 23 2025     2025    K 4.1 2025     2025    CO2 27.0 2025     (H) 2025    CA 8.4 (L)  03/18/2025    ALB 3.6 03/19/2025    ALKPHO 89 03/19/2025    BILT 0.7 03/19/2025    TP 6.1 03/19/2025    AST 25 03/19/2025    ALT 33 03/19/2025    PTT 22.8 (L) 03/16/2025    INR 0.96 03/16/2025    KILLIAN 123 (H) 03/19/2025    LIP 79 (H) 03/19/2025    TROPHS 5 03/15/2025       NM GB HEPATOBILIARY SCAN  (CPT=78226)    Result Date: 3/17/2025  CONCLUSION: Normal examination.     Dictated by (CST): Nilton Arreola MD on 3/17/2025 at 1:06 PM     Finalized by (CST): Nilton Arreola MD on 3/17/2025 at 1:09 PM               Assessment & Plan:       Acute pancreatitis, unspecified complication status, unspecified pancreatitis type (HCC)  NPO, IV fluids ,HIDA scan today, negative    Acute cholecystitis , on clear liquids, advance as tolerated  Pain control  HTN BP stable  HLD on statin  DM monitor   On Rocephin and Flagyl  D/W son at bedside  Time spent 35 mins

## 2025-03-19 NOTE — PLAN OF CARE
Problem: Patient Centered Care  Goal: Patient preferences are identified and integrated in the patient's plan of care  Description: Interventions:- What would you like us to know as we care for you? From home alone- Provide timely, complete, and accurate information to patient/family- Incorporate patient and family knowledge, values, beliefs, and cultural backgrounds into the planning and delivery of care- Encourage patient/family to participate in care and decision-making at the level they choose- Honor patient and family perspectives and choices  Outcome: Progressing     Problem: Diabetes/Glucose Control  Goal: Glucose maintained within prescribed range  Description: INTERVENTIONS:- Monitor Blood Glucose as ordered- Assess for signs and symptoms of hyperglycemia and hypoglycemia- Administer ordered medications to maintain glucose within target range- Assess barriers to adequate nutritional intake and initiate nutrition consult as needed- Instruct patient on self management of diabetes  Outcome: Progressing     Problem: Patient/Family Goals  Goal: Patient/Family Long Term Goal  Description: Patient's Long Term Goal: Discharge from the hospital   Interventions:  - Monitor vital signs  - Monitor appropriate labs  - Monitor blood glucose levels  - Pain management  - Administer medications per order  - Follow MD orders  - Diagnostics per order  - Update / inform patient and family on plan of care  - Discharge planning  - See additional Care Plan goals for specific interventions  Outcome: Progressing  Goal: Patient/Family Short Term Goal  Description: Patient's Short Term Goal: Improve abdominal pain and vomiting   Interventions:   - Monitor vital signs  - Monitor appropriate labs  - Monitor blood glucose levels  - Pain management  - Administer medications per order  - Follow MD orders  - Diagnostics per order  - Update / inform patient and family on plan of care- See additional Care Plan goals for specific  interventions  Outcome: Progressing     Problem: PAIN - ADULT  Goal: Verbalizes/displays adequate comfort level or patient's stated pain goal  Description: INTERVENTIONS:- Encourage pt to monitor pain and request assistance- Assess pain using appropriate pain scale- Administer analgesics based on type and severity of pain and evaluate response- Implement non-pharmacological measures as appropriate and evaluate response- Consider cultural and social influences on pain and pain management- Manage/alleviate anxiety- Utilize distraction and/or relaxation techniques- Monitor for opioid side effects- Notify MD/LIP if interventions unsuccessful or patient reports new pain- Anticipate increased pain with activity and pre-medicate as appropriate  Outcome: Progressing     Problem: SAFETY ADULT - FALL  Goal: Free from fall injury  Description: INTERVENTIONS:- Assess pt frequently for physical needs- Identify cognitive and physical deficits and behaviors that affect risk of falls.- [place] fall precautions as indicated by assessment.- Educate pt/family on patient safety including physical limitations- Instruct pt to call for assistance with activity based on assessment- Modify environment to reduce risk of injury- Provide assistive devices as appropriate- Consider OT/PT consult to assist with strengthening/mobility- Encourage toileting schedule  Outcome: Progressing     Problem: DISCHARGE PLANNING  Goal: Discharge to home or other facility with appropriate resources  Description: INTERVENTIONS:- Identify barriers to discharge w/pt and caregiver- Include patient/family/discharge partner in discharge planning- Arrange for needed discharge resources and transportation as appropriate- Identify discharge learning needs (meds, wound care, etc)- Arrange for interpreters to assist at discharge as needed- Consider post-discharge preferences of patient/family/discharge partner- Complete POLST form as appropriate- Assess patient's  ability to be responsible for managing their own health- Refer to Case Management Department for coordinating discharge planning if the patient needs post-hospital services based on physician/LIP order or complex needs related to functional status, cognitive ability or social support system  Outcome: Progressing     Problem: Altered Communication/Language Barrier  Goal: Patient/Family is able to understand and participate in their care  Description: Interventions:- Assess communication ability and preferred communication style- Implement communication aides and strategies- Use visual cues when possible- Listen attentively, be patient, do not interrupt- Minimize distractions- Allow time for understanding and response- Establish method for patient to ask for assistance (call light)- Provide an  as needed- Communicate barriers and strategies to overcome with those who interact with patient  Outcome: Progressing     Problem: GASTROINTESTINAL - ADULT  Goal: Minimal or absence of nausea and vomiting  Description: INTERVENTIONS:- Maintain adequate hydration with IV or PO as ordered and tolerated- Nasogastric tube to low intermittent suction as ordered- Evaluate effectiveness of ordered antiemetic medications- Provide nonpharmacologic comfort measures as appropriate- Advance diet as tolerated, if ordered- Obtain nutritional consult as needed- Evaluate fluid balance  Outcome: Progressing  Goal: Maintains or returns to baseline bowel function  Description: INTERVENTIONS:- Assess bowel function- Maintain adequate hydration with IV or PO as ordered and tolerated- Evaluate effectiveness of GI medications- Encourage mobilization and activity- Obtain nutritional consult as needed- Establish a toileting routine/schedule- Consider collaborating with pharmacy to review patient's medication profile  Outcome: Progressing     Problem: SKIN/TISSUE INTEGRITY - ADULT  Goal: Skin integrity remains intact  Description:  INTERVENTIONS- Assess and document risk factors for pressure ulcer development- Assess and document skin integrity- Monitor for areas of redness and/or skin breakdown- Initiate interventions, skin care algorithm/standards of care as needed  Outcome: Progressing

## 2025-03-19 NOTE — PROGRESS NOTES
[facility]  part of [facility]    Progress Note    Francisco Del Rio Patient Status:  Inpatient    10/24/1945 MRN [id number]   Location [facility] 5SW/SE Attending Omayra Henao MD   Hosp Day # 3 PCP IRIS BOLAÑOS MD     Date of Admission:  3/15/2025      Subjective:   Pain improved. No n/v. Tolerating diet    Current Medications:  Current Facility-Administered Medications   Medication Dose Route Frequency    lactated ringers infusion   Intravenous Continuous    alum-mag hydroxide-simethicone (Maalox) 200-200-20 MG/5ML oral suspension 30 mL  30 mL Oral QID PRN    HYDROcodone-acetaminophen (Norco) 5-325 MG per tab 1 tablet  1 tablet Oral Q6H PRN    ondansetron (Zofran) 4 MG/2ML injection 4 mg  4 mg Intravenous Q6H PRN    cefTRIAXone (Rocephin) 2 g in sodium chloride 0.9% 100 mL IVPB-ADDV  2 g Intravenous Q24H    metroNIDAZOLE in sodium chloride 0.79% (Flagyl) 5 mg/mL IVPB premix 500 mg  500 mg Intravenous Q12H    aspirin DR tab 81 mg  81 mg Oral Daily    lisinopril (Prinivil; Zestril) tab 5 mg  5 mg Oral Daily    pantoprazole (Protonix) DR tab 40 mg  40 mg Oral Daily with breakfast    insulin aspart (NovoLOG) 100 Units/mL FlexPen 1-5 Units  1-5 Units Subcutaneous TID CC    HYDROmorphone (Dilaudid) 1 MG/ML injection 0.5 mg  0.5 mg Intravenous Q3H PRN       ENDOSCOPIC REPORTS:   Colonoscopy:over 5 years ago  EGD: none  ERCP:    History of Prep or Sedation intolerance with previous endoscopic procedures: None    GENERAL: feels well otherwise.  SKIN: denies any unusual skin lesions  EYES: denies any new visual changes or double vision, denies red painful eyes  HEENT: denies any new hearing changes, nasal congestion, sinus pain  LUNGS: denies shortness of breath with exertion or persistent cough  CARDIOVASCULAR: denies chest pain on exertion  GI: as above  : denies dysuria, nocturia or changes in stream  MUSCULOSKELETAL: denies new myalgias, swelling  NEURO: denies new sensory or motor  deficits.  PSYCHE: denies depression or anxiety  HEMATOLOGIC: denies bleeding or bruising    Physical Exam:   Blood pressure 142/78, pulse 68, temperature 97.3 °F (36.3 °C), temperature source Oral, resp. rate 18, height 5' 6\" (1.676 m), weight 179 lb 3.2 oz (81.3 kg), SpO2 97%.    General appearance:  alert, appears stated age and cooperative    HEENT: negative findings: lids and lashes normal and conjunctivae and sclerae normal.     Cardiovascular: regular rate and rhythm    Abdominal: soft, bowel sounds present; +distended, +tender to palp in epigastric region w/o rebound nor guarding    Extremities: No edema, cyanosis, or clubbing    Skin: warm and dry    Neurologic: Grossly normal    Psychiatric: calm      Results:     Laboratory Data:  Lab Results   Component Value Date    WBC 16.3 (H) 03/18/2025    HGB 13.3 03/18/2025    HCT 39.8 03/18/2025    .0 (L) 03/18/2025    CREATSERUM 1.24 03/18/2025    BUN 23 03/18/2025    BUN 23 03/18/2025     03/18/2025    K 4.1 03/18/2025     03/18/2025    CO2 27.0 03/18/2025     (H) 03/18/2025    CA 8.4 (L) 03/18/2025    ALB 3.6 03/19/2025    ALKPHO 89 03/19/2025    TP 6.1 03/19/2025    AST 25 03/19/2025    ALT 33 03/19/2025    PTT 22.8 (L) 03/16/2025    INR 0.96 03/16/2025    PTP 13.4 03/16/2025    KILLIAN 123 (H) 03/19/2025    LIP 79 (H) 03/19/2025    TROPHS 5 03/15/2025         Imaging:  No results found.       Impression:   1)Acute pancreatitis, unspecified complication status, ? gallstone pancreatitis however no cholelithiasis on imaging noted.  MRI/MRCP w/o choledocholithiasis.    - okay to stop LR  - okay to advance to low fat diet as tolerated  - HIDA negative for cholecystitis.   - consider stopping Abx without e/o cholecystitis.  No cross sectional imaging to suggest pancreatitis with necrosis.    - as no e/o cholelithiasis, consider for outpatient MRI  pancreas in 4 weeks.        3)elevated ast/alt/alk phos - acute elevated in setting as noted  above (normal in 11/2024); ast> alt with alk phos > alt supporting cholestatic pattern  -serially improving.  Suspect passed gallstone ? However no cholelithiasis on US/MRI    4) ANDRES on CKD  Now normalized.     5) Leukocytosis suspect reactive from pancreatitis  - consider stopping Abx w/o e/o cholecystitis      Thank you for allowing me to participate in the care of your patient.      Evelyn Leary DO  [facility]  Gastroenterology

## 2025-03-19 NOTE — PROGRESS NOTES
[facility]  part of [facility]    Progress Note    Francisco Del Rio Patient Status:  Inpatient    10/24/1945 MRN [id number]   Location [facility] 5SW/SE Attending Omayra Henao MD   Hosp Day # 3 PCP IRIS BOLAÑOS MD     Subjective:  Feel better  tolerated liq    Objective/Physical Exam:  General: Alert, orientated x3.  Cooperative.  No apparent distress.  Vital Signs:  Blood pressure 142/78, pulse 68, temperature 97.3 °F (36.3 °C), temperature source Oral, resp. rate 18, height 5' 6\" (1.676 m), weight 179 lb 3.2 oz (81.3 kg), SpO2 97%.      Abdomen:  Soft, non-distended, non-tender, Labs:  Lab Results   Component Value Date    WBC 16.3 (H) 2025    HGB 13.3 2025    HCT 39.8 2025    .0 (L) 2025    CREATSERUM 1.24 2025    BUN 23 2025    BUN 23 2025     2025    K 4.1 2025     2025    CO2 27.0 2025     (H) 2025    CA 8.4 (L) 2025    ALB 3.6 2025    ALKPHO 89 2025    BILT 0.7 2025    TP 6.1 2025    AST 25 2025    ALT 33 2025    PTT 22.8 (L) 2025    INR 0.96 2025    KILLIAN 123 (H) 2025    LIP 79 (H) 2025       Imaging:  NM GB HEPATOBILIARY SCAN  (CPT=78226)    Result Date: 3/17/2025  PROCEDURE: NM GB HEPATOBILIARY SCAN (CPT=78226)  COMPARISON: [facility], MRI ABDOMEN AND MRCP W/3D (NTY=73854/74926), 3/16/2025, 7:45 AM.  INDICATIONS: pancreatitis   possible cholecystitis.  Follow-up MRCP.  TECHNIQUE: Hepatobiliary imaging was performed after the injection of 8.2 millicuries of Technetium-99m Choletec into the right and vein.   FINDINGS:   LIVER: There is normal uptake of radiotracer by the liver on the immediate and 5 minute images.  BILIARY DUCTS: Normal.  The central biliary structures are seen 10 minutes after tracer injection.  GALLBLADDER: Normal.  The gallbladder was visualized by the 40 minute image.   INTESTINE: Normal, with evidence of tracer by the 10 minute image.  This is confirmed on subsequent images.          CONCLUSION: Normal examination.     Dictated by (CST): Nilton Arreola MD on 3/17/2025 at 1:06 PM     Finalized by (CST): Nilton Arreola MD on 3/17/2025 at 1:09 PM          CT ABDOMEN+PELVIS(CONTRAST ONLY)(CPT=74177)    Result Date: 3/16/2025  PROCEDURE: CT ABDOMEN + PELVIS (CONTRAST ONLY) (CPT=74177)  COMPARISON: None.  INDICATIONS: Abdominal pain,vomiting  TECHNIQUE: CT images of the abdomen and pelvis were obtained with non-ionic intravenous contrast material.  Automated exposure control for dose reduction was used. Adjustment of the mA and/or kV was done based on the patient's size. Use of iterative reconstruction technique for dose reduction was used.  Dose information is transmitted to the ACR (American College of Radiology) NRDR (National Radiology Data Registry) which includes the Dose Index Registry.  FINDINGS:  LOWER THORAX:  Pulmonary edema.  Cardiomegaly. LIVER:   No enlargement, atrophy, abnormal density, or significant focal lesion. GALLBLADDER: Gallbladder wall thickening.  No radiopaque gallstone. BILIARY:   No visible dilatation or calcification.  PANCREAS:   Diffuse pancreatic edema at surrounding inflammatory changes.  No discrete fluid collection.  No ductal dilatation. SPLEEN:   No enlargement or focal lesion.  ADRENALS:   No mass or enlargement.  KIDNEYS:   No mass, obstruction, or calcification.  RETROPERITONEUM:   No mass or enlarged adenopathy.  Fluid is noted in the anterior pararenal space.  AORTA/VASCULAR:   Infrarenal abdominal aortic penetrating ulcer is seen with aneurysmal dilatation measuring up to 3.1 cm.  Questionable filling defect in the portal vein. PERITONEUM: No free fluid or air. GI TRACT/MESENTERY:    No visible mass, obstruction, or bowel wall thickening. Diverticulosis without evidence of acute diverticulitis. URINARY BLADDER: Unremarkable. REPRODUCTIVE  ORGANS: Prostatomegaly measuring 7.2 x 5 x 4 x 6.4 cm. ABDOMINAL WALL:   No acute abnormality.  Fat containing umbilical hernia. BONES:  No acute fracture.  Moderate spondylosis.          CONCLUSION:   1. Moderate to severe acute interstitial pancreatitis.  No discrete peripancreatic fluid collection is identified.  2. Gallbladder wall thickened may be reactive to pancreatitis.  No radiopaque gallstone is seen.  This is evaluated to advantage on subsequent right upper quadrant ultrasound and MRI/MRCP.  3. Questionable filling defect in the portal vein likely relates to venous admixture.  4. Pulmonary edema.  5. Infrarenal abdominal aortic aneurysm at penetrating ulcer measuring 3.1 cm.  6. Prostatomegaly.  7. Colonic diverticulosis. Additional chronic or incidental findings are described in the body of this report.      Preliminary report was given by [facility].  There are no clinically significant discrepancies.    [facility]     Dictated by (CST): Emilio Pretty MD on 3/16/2025 at 1:47 PM     Finalized by (CST): Emilio Pretty MD on 3/16/2025 at 1:53 PM          MRI ABDOMEN AND MRCP W/3D (CPT=74181/09397)    Result Date: 3/16/2025  PROCEDURE: MRI ABDOMEN&MRCP W/3D (CPT=74181/21809)  MRCP   COMPARISON: [facility], CT ABDOMEN + PELVIS (CONTRAST ONLY) (CPT=74177), 3/16/2025, 0:05 AM  INDICATIONS: choledocholithiasis?  TECHNIQUE: A comprehensive examination was performed utilizing a variety of imaging planes and imaging parameters to optimize visualization of suspected pathology.  Images were obtained without contrast.    Magnetic resonance cholangiopancreatography was also performed.    FINDINGS:  LOWER THORAX: Small right pleural effusion.  Cardiomegaly.  LIVER: Normal size and morphology. No evidence of steatosis. No suspicious focal lesion.  GALLBLADDER: Gallbladder wall thickening and edema is noted.  No gallstones. BILIARY TREE: No intra or extrahepatic biliary ductal dilatation.  No filling  defects.  PANCREAS: Extensive pancreatic edema peripancreatic fat stranding.  The pancreatic duct is not dilated.  No discrete fluid collection is identified. SPLEEN: Normal.  No enlargement or focal lesion.  ADRENALS: Normal.  KIDNEYS: Normal.  Bilateral renal cysts. RETROPERITONEUM: Extensive retroperitoneal edema extending down the anterior pararenal space. PERITONEUM: No ascites.  No enlarged lymph nodes. GI TRACT: No evidence of bowel obstruction. AORTA & MAJOR BRANCHES: Normal flow voids. VENOUS SYSTEM: Normal flow voids. ABDOMINAL WALL: Normal.  BONES: No acute fracture.  Mild spondylosis.          CONCLUSION:   1. No gallstones.  No choledocholithiasis.  Gallbladder wall thickening and edema is probably reactive to adjacent pancreatitis, but acute cholecystitis is not excluded.  2. Severe acute interstitial pancreatitis.  No discrete peripancreatic fluid collections are seen.  Due to lack of intravenous contrast, the pancreas is not well evaluated for necrosis.  3. Small right pleural effusion.    [facility]    Dictated by (CST): Emilio Pretty MD on 3/16/2025 at 8:56 AM     Finalized by (CST): Emilio Pretty MD on 3/16/2025 at 9:01 AM          US GALLBLADDER (CPT=76705)    Result Date: 3/16/2025  PROCEDURE: US GALLBLADDER (CPT=76705)  COMPARISON: [facility], CT ABDOMEN + PELVIS (CONTRAST ONLY) (CPT=74177), 3/16/2025, 0:05 AM.  INDICATIONS: Portal veinous thrombosis? also eval for cholecystitis  TECHNIQUE:   The gallbladder was evaluated with grayscale ultrasound.   FINDINGS:   PANCREAS: Not well seen. LIVER:   Normal size, echotexture and color flow.  No masses. Color flow and Doppler imaging of portal and hepatic veins show patency and antegrade flow. BILE DUCTS:   No intrahepatic or extrahepatic biliary ductal dilatation.  Common bile duct measures 2 mm. GALLBLADDER: Gallbladder wall thickening and edema measuring 10 mm.  Mild gallbladder distention.  No gallstones are seen.  The sonographic  King sign cannot be assessed due to prior pain medication administration.  RIGHT KIDNEY: The right kidney measures 7.5 cm long axis. There is no hydronephrosis.  OTHER:   No peritoneal free fluid.          CONCLUSION:   Gallbladder mild distension and wall thickening/edema may indicate acute cholecystitis.  Note no gallstones are identified.  No biliary ductal dilatation.  Suboptimal evaluation of the portal vein is negative for portal venous thrombus.      Preliminary report was given by [facility].  There are no clinically significant discrepancies.    [facility]     Dictated by (CST): Emilio Pretty MD on 3/16/2025 at 7:22 AM     Finalized by (CST): Emilio Pretty MD on 3/16/2025 at 7:25 AM            Assessment/Plan:  Patient Active Problem List   Diagnosis    Type II diabetes mellitus, uncontrolled    Hypercholesterolemia    Hypertension, benign    Tobacco use disorder    Acute renal failure    Herpes zoster    Pancreatitis (HCC)    Acute pancreatitis, unspecified complication status, unspecified pancreatitis type (HCC)    Acute cholecystitis   Wbc 16.3    Amylase 123  lipase 79    Resolving pancreatitis  Etiology unk?  Increase po   full liq today   solids tomorrow   if tolerates consider discharge planning  Advise repeat CT /or US as outpt    MARTÍN GONZALEZ MD  3/19/2025  9:08 AM

## 2025-03-20 LAB
AMYLASE SERPL-CCNC: 94 U/L (ref 30–118)
ANION GAP SERPL CALC-SCNC: 5 MMOL/L (ref 0–18)
BUN BLD-MCNC: 21 MG/DL (ref 9–23)
BUN/CREAT SERPL: 19.4 (ref 10–20)
CALCIUM BLD-MCNC: 8.1 MG/DL (ref 8.7–10.4)
CHLORIDE SERPL-SCNC: 107 MMOL/L (ref 98–112)
CO2 SERPL-SCNC: 28 MMOL/L (ref 21–32)
CREAT BLD-MCNC: 1.08 MG/DL
DEPRECATED RDW RBC AUTO: 42.9 FL (ref 35.1–46.3)
EGFRCR SERPLBLD CKD-EPI 2021: 69 ML/MIN/1.73M2 (ref 60–?)
ERYTHROCYTE [DISTWIDTH] IN BLOOD BY AUTOMATED COUNT: 13.4 % (ref 11–15)
GLUCOSE BLD-MCNC: 137 MG/DL (ref 70–99)
GLUCOSE BLDC GLUCOMTR-MCNC: 134 MG/DL (ref 70–99)
GLUCOSE BLDC GLUCOMTR-MCNC: 164 MG/DL (ref 70–99)
GLUCOSE BLDC GLUCOMTR-MCNC: 193 MG/DL (ref 70–99)
GLUCOSE BLDC GLUCOMTR-MCNC: 253 MG/DL (ref 70–99)
HCT VFR BLD AUTO: 37.7 %
HGB BLD-MCNC: 12.4 G/DL
LIPASE SERPL-CCNC: 77 U/L (ref 12–53)
MCH RBC QN AUTO: 28.6 PG (ref 26–34)
MCHC RBC AUTO-ENTMCNC: 32.9 G/DL (ref 31–37)
MCV RBC AUTO: 87.1 FL
OSMOLALITY SERPL CALC.SUM OF ELEC: 295 MOSM/KG (ref 275–295)
PLATELET # BLD AUTO: 132 10(3)UL (ref 150–450)
POTASSIUM SERPL-SCNC: 4.5 MMOL/L (ref 3.5–5.1)
RBC # BLD AUTO: 4.33 X10(6)UL
SODIUM SERPL-SCNC: 140 MMOL/L (ref 136–145)
WBC # BLD AUTO: 11.2 X10(3) UL (ref 4–11)

## 2025-03-20 PROCEDURE — 97161 PT EVAL LOW COMPLEX 20 MIN: CPT

## 2025-03-20 PROCEDURE — 82150 ASSAY OF AMYLASE: CPT | Performed by: SPECIALIST

## 2025-03-20 PROCEDURE — 83690 ASSAY OF LIPASE: CPT | Performed by: SPECIALIST

## 2025-03-20 PROCEDURE — 97116 GAIT TRAINING THERAPY: CPT

## 2025-03-20 PROCEDURE — 80048 BASIC METABOLIC PNL TOTAL CA: CPT | Performed by: INTERNAL MEDICINE

## 2025-03-20 PROCEDURE — 94760 N-INVAS EAR/PLS OXIMETRY 1: CPT

## 2025-03-20 PROCEDURE — 97165 OT EVAL LOW COMPLEX 30 MIN: CPT

## 2025-03-20 PROCEDURE — 97535 SELF CARE MNGMENT TRAINING: CPT

## 2025-03-20 PROCEDURE — 85027 COMPLETE CBC AUTOMATED: CPT | Performed by: INTERNAL MEDICINE

## 2025-03-20 PROCEDURE — 82962 GLUCOSE BLOOD TEST: CPT

## 2025-03-20 NOTE — OCCUPATIONAL THERAPY NOTE
OCCUPATIONAL THERAPY EVALUATION - INPATIENT     Room Number: 538/538-A  Evaluation Date: 3/20/2025  Type of Evaluation: Initial  Presenting Problem: Acute pancreatitis (HTN, HLD, DM, gastritis)    Physician Order: IP Consult to Occupational Therapy  Reason for Therapy: ADL/IADL Dysfunction and Discharge Planning    OCCUPATIONAL THERAPY ASSESSMENT   Patient is a 79 year old male admitted 3/15/2025 for as above.  Prior to admission, patient's baseline is independent w/ ADLs, functional mobility, working full time as a .  Patient is currently functioning near baseline with ADLs and functional mobility.  Patient is requiring CGA/ SBA as a result of the following impairments: fatigue. Occupational Therapy will continue to follow for duration of hospitalization.    Patient will benefit from continued skilled OT Services with no additional skilled services but increased support at home.    PLAN DURING HOSPITALIZATION     OT Treatment Plan: Balance activities, Energy conservation/work simplification techniques, ADL training, Functional transfer training, Endurance training, Equipment eval/education     OCCUPATIONAL THERAPY MEDICAL/SOCIAL HISTORY   Problem List  Principal Problem:    Acute pancreatitis, unspecified complication status, unspecified pancreatitis type (HCC)  Active Problems:    Pancreatitis (HCC)    Acute cholecystitis    HOME SITUATION  Type of Home: Apartment  Home Layout: One level  Lives With: Alone  Drives: Yes (works full time as )  Patient Regularly Uses: None      SUBJECTIVE  Pt is a      OCCUPATIONAL THERAPY EXAMINATION      OBJECTIVE  Precautions:  needed (Czech)  Fall Risk: Standard fall risk      PAIN ASSESSMENT  Rating: Unable to rate  Location: abdomen (mild pain)      ACTIVITY TOLERANCE                         O2 SATURATIONS  Oxygen Therapy  SPO2% Ambulation on Room Air: 94    COGNITION  WFL    RANGE OF MOTION   Upper extremity ROM is within  functional limits     STRENGTH ASSESSMENT  Upper extremity strength is within functional limits       ACTIVITIES OF DAILY LIVING ASSESSMENT  AM-PAC ‘6-Clicks’ Inpatient Daily Activity Short Form  How much help from another person does the patient currently need…  -   Putting on and taking off regular lower body clothing?: A Little  -   Bathing (including washing, rinsing, drying)?: A Little  -   Toileting, which includes using toilet, bedpan or urinal? : None  -   Putting on and taking off regular upper body clothing?: None  -   Taking care of personal grooming such as brushing teeth?: None  -   Eating meals?: None    AM-PAC Score:  Score: 22  Approx Degree of Impairment: 25.8%  Standardized Score (AM-PAC Scale): 47.1  CMS Modifier (G-Code): CJ    FUNCTIONAL TRANSFER ASSESSMENT     BED MOBILITY  Supine to Sit : Stand-by Assist    FUNCTIONAL ADL ASSESSMENT  Grooming Standing: Stand-by Assist  Toileting Standing: Contact Guard Assist  Toileting routine including clothing management and grooming at the sink.  Encouragement for pt to participate with PT in further functional mobility after OT eval.     Skilled Therapy Provided: RN cleared pt for participation in occupational therapy session Upon arrival, pt was supine in bed and agreeable to activity. Pt's son present during session, intermittently provided Kazakh language interpretation . Pt benefited from additional time ,verbal cues , RW to maximize participation.  Pt completed ADLs and functional mobility with overall CGA / SBA - pt using RW to conserve energy , which is below pt's baseline of ind and working.     EDUCATION PROVIDED  Patient Education : Role of Occupational Therapy; Plan of Care  Patient's Response to Education: Verbalized Understanding; Returned Demonstration    The patient's Approx Degree of Impairment: 25.8% has been calculated based on documentation in the [facility] '6 clicks' Inpatient Daily Activity Short Form.  Research supports that patients  with this level of impairment may benefit from home.  Final disposition will be made by interdisciplinary medical team.     Patient End of Session: Up in chair, Needs met, Call light within reach, RN aware of session/findings, Family present    OT Goals  Patients self stated goal is: home     Patient will complete functional transfer with Mod I  Comment:     Patient will complete toileting with Mod I  Comment:     Patient will tolerate standing for 4 minutes in prep for adls with Mod I   Comment:    Patient will complete item retrieval with Mod I  Comment:          Goals  on: 25  Frequency: 3x/w    Patient Evaluation Complexity Level:   Occupational Profile/Medical History LOW - Brief history including review of medical or therapy records    Specific performance deficits impacting engagement in ADL/IADL LOW  1 - 3 performance deficits    Client Assessment/Performance Deficits LOW - No comorbidities nor modifications of tasks    Clinical Decision Making LOW - Analysis of occupational profile, problem-focused assessments, limited treatment options    Overall Complexity LOW     OT Session Time: 15 minutes  Self-Care Home Management: 15 minutes

## 2025-03-20 NOTE — PHYSICAL THERAPY NOTE
PHYSICAL THERAPY EVALUATION - INPATIENT     Room Number: 538/538-A  Evaluation Date: 3/20/2025  Type of Evaluation: Initial   Physician Order: PT Eval and Treat    Presenting Problem: pancreatitis  Co-Morbidities : HTN, HLD, DM, gastritis  Reason for Therapy: Mobility Dysfunction and Discharge Planning    PHYSICAL THERAPY ASSESSMENT   Patient is a 79 year old male admitted 3/15/2025 for pancreatits.  Prior to admission, patient's baseline is independent without a device, works full time as .  Patient is currently functioning near baseline with bed mobility, transfers, and gait.  Patient is requiring supervision and stand-by assist as a result of the following impairments: pain and medical status.  Physical Therapy will continue to follow for duration of hospitalization.    Patient will benefit from continued skilled PT Services for duration of hospitalization, however, given the patient is functioning near baseline level do not anticipate skilled therapy needs at discharge .    PLAN DURING HOSPITALIZATION  Nursing Mobility Recommendation : 1 Assist  PT Device Recommendation: None  PT Treatment Plan: Body mechanics, Endurance, Gait training, Stair training  Rehab Potential : Good  Frequency (Obs): 5x/week     PHYSICAL THERAPY MEDICAL/SOCIAL HISTORY     Problem List  Principal Problem:    Acute pancreatitis, unspecified complication status, unspecified pancreatitis type (HCC)  Active Problems:    Pancreatitis (HCC)    Acute cholecystitis      HOME SITUATION  Type of Home: Apartment  Home Layout: One level  Stairs to Enter : 5   Railing: Yes    Stairs to Bedroom: 10    Railing: No    Lives With: Alone    Drives: Yes (works full time as )   Patient Regularly Uses: None     Prior Level of [place]: independent no device    SUBJECTIVE  Patient reporting fatigue, cooperative.     PHYSICAL THERAPY EXAMINATION   OBJECTIVE  Precautions:  needed (Amharic)  Fall Risk: Standard fall  risk    WEIGHT BEARING RESTRICTION  none    PAIN ASSESSMENT  Rating: Other (Comment) (did not quantify, a little)  Location: abdomen  Management Techniques: Body mechanics, Breathing techniques    COGNITION  Overall Cognitive Status:  WFL - within functional limits    RANGE OF MOTION AND STRENGTH ASSESSMENT  Upper extremity ROM and strength are within functional limits BUEs  Lower extremity ROM is within functional limits BLEs  Lower extremity strength is within functional limits BLEs    BALANCE  Static Sitting: Normal  Dynamic Sitting: Normal  Static Standing: Normal  Dynamic Standing: Good    ACTIVITY TOLERANCE  Pulse: 71  Heart Rate Source: Monitor     BP: 149/68  BP Location: Left arm  BP Method: Automatic  Patient Position: Lying    O2 WALK  Oxygen Therapy  SPO2% on Oxygen at Rest: 92  At rest oxygen flow (liters per minute): 1  SPO2% Ambulation on Room Air: 94    AM-PAC '6-Clicks' INPATIENT SHORT FORM - BASIC MOBILITY  How much difficulty does the patient currently have...  Patient Difficulty: Turning over in bed (including adjusting bedclothes, sheets and blankets)?: None   Patient Difficulty: Sitting down on and standing up from a chair with arms (e.g., wheelchair, bedside commode, etc.): A Little   Patient Difficulty: Moving from lying on back to sitting on the side of the bed?: None   How much help from another person does the patient currently need...   Help from Another: Moving to and from a bed to a chair (including a wheelchair)?: A Little   Help from Another: Need to walk in hospital room?: A Little   Help from Another: Climbing 3-5 steps with a railing?: A Little     AM-PAC Score:  Raw Score: 20   Approx Degree of Impairment: 35.83%   Standardized Score (AM-PAC Scale): 47.67   CMS Modifier (G-Code): CJ    FUNCTIONAL ABILITY STATUS  Functional Mobility/Gait Assessment  Gait Assistance: Other (Comment), Supervision (SBA)  Distance (ft): 15+100  Assistive Device: None, Other (Comment) (IV  pole)  Pattern: Within Functional Limits  Rolling: independent  Supine to Sit: independent  Sit to Stand: supervision    Exercise/Education Provided:  Education Provided To: Patient, Family/Caregiver  Patient Education: Role of Physical Therapy, Plan of Care, Discharge Recommendations, Functional Transfer Techniques, Energy Conservation, Proper Body Mechanics, Gait Training  Patient's Response to Education: Verbalized Understanding, Returned Demonstration  Family/Caregiver Education: Role of Physical Therapy, Plan of Care  Family/Caregiver's Response to Education: Verbalized Understanding    Skilled Therapy Provided: Patient tolerating household distances with no device, managing own IV pole, no loss of balance. Patient highly independent at baseline, patient's son present to provide Cameroonian interpretation.     Patient seen for evaluation in coordination with occupational therapy to maximize patient function and enhance communication with patient. Patient received semi-fowlers in bed, agreeable to physical therapy evaluation. Vital signs monitored as noted above, no adverse symptoms and patient stable during session. Next session anticipate to progress gait and stair negotiation.    Patient history and/or personal factors that may impact the plan of care include home accessibility concerns. Based on the physical therapy examination of the noted systems and functional activity/participation limitations, the patient presentation is stable given the patient demonstrates no significant barriers to meeting therapy goals and is functioning near baseline level.    The patient's Approx Degree of Impairment: 35.83% has been calculated based on documentation in the [facility] '6 clicks' Inpatient Basic Mobility Short Form.  Research supports that patients with this level of impairment may benefit from no services.  Final disposition will be made by interdisciplinary medical team.    Patient End of Session: Up in chair, Needs met,  Call light within reach, RN aware of session/findings, All patient questions and concerns addressed, Hospital anti-slip socks, Family present    CURRENT GOALS  Goals to be met by: 4/5/25  Patient Goal Patient's self-stated goal is: return home safely   Goal #1 Patient is able to demonstrate supine - sit EOB @ level: independent     Goal #1   Current Status    Goal #2 Patient is able to demonstrate transfers E[place] to/from [facility] at assistance level: independent with none     Goal #2  Current Status    Goal #3 Patient is able to ambulate 150 feet with assist device: none at assistance level: independent   Goal #3   Current Status    Goal #4 Patient will negotiate 10 stairs/one curb w/ assistive device and supervision   Goal #4   Current Status    Goal #5 Patient to demonstrate independence with home activity/exercise instructions provided to patient in preparation for discharge.   Goal #5   Current Status    Goal #6    Goal #6  Current Status      Patient Evaluation Complexity Level:  History Moderate - 1 or 2 personal factors and/or co-morbidities   Examination of body systems Moderate - addressing a total of 3 or more elements   Clinical Presentation Low- Stable   Clinical Decision Making  Low Complexity     Gait Training: 15 minutes      Starr Bill, PT, DPT  [facility]  Rehab Services - Physical Therapy  [id number]

## 2025-03-20 NOTE — PLAN OF CARE
Problem: Patient Centered Care  Goal: Patient preferences are identified and integrated in the patient's plan of care  Description: Interventions:- What would you like us to know as we care for you? From home alone- Provide timely, complete, and accurate information to patient/family- Incorporate patient and family knowledge, values, beliefs, and cultural backgrounds into the planning and delivery of care- Encourage patient/family to participate in care and decision-making at the level they choose- Honor patient and family perspectives and choices  Outcome: Progressing     Problem: Diabetes/Glucose Control  Goal: Glucose maintained within prescribed range  Description: INTERVENTIONS:- Monitor Blood Glucose as ordered- Assess for signs and symptoms of hyperglycemia and hypoglycemia- Administer ordered medications to maintain glucose within target range- Assess barriers to adequate nutritional intake and initiate nutrition consult as needed- Instruct patient on self management of diabetes  Outcome: Progressing     Problem: PAIN - ADULT  Goal: Verbalizes/displays adequate comfort level or patient's stated pain goal  Description: INTERVENTIONS:- Encourage pt to monitor pain and request assistance- Assess pain using appropriate pain scale- Administer analgesics based on type and severity of pain and evaluate response- Implement non-pharmacological measures as appropriate and evaluate response- Consider cultural and social influences on pain and pain management- Manage/alleviate anxiety- Utilize distraction and/or relaxation techniques- Monitor for opioid side effects- Notify MD/LIP if interventions unsuccessful or patient reports new pain- Anticipate increased pain with activity and pre-medicate as appropriate  Outcome: Progressing     Problem: SAFETY ADULT - FALL  Goal: Free from fall injury  Description: INTERVENTIONS:- Assess pt frequently for physical needs- Identify cognitive and physical deficits and behaviors  that affect risk of falls.- [place] fall precautions as indicated by assessment.- Educate pt/family on patient safety including physical limitations- Instruct pt to call for assistance with activity based on assessment- Modify environment to reduce risk of injury- Provide assistive devices as appropriate- Consider OT/PT consult to assist with strengthening/mobility- Encourage toileting schedule  Outcome: Progressing     Problem: DISCHARGE PLANNING  Goal: Discharge to home or other facility with appropriate resources  Description: INTERVENTIONS:- Identify barriers to discharge w/pt and caregiver- Include patient/family/discharge partner in discharge planning- Arrange for needed discharge resources and transportation as appropriate- Identify discharge learning needs (meds, wound care, etc)- Arrange for interpreters to assist at discharge as needed- Consider post-discharge preferences of patient/family/discharge partner- Complete POLST form as appropriate- Assess patient's ability to be responsible for managing their own health- Refer to Case Management Department for coordinating discharge planning if the patient needs post-hospital services based on physician/LIP order or complex needs related to functional status, cognitive ability or social support system  Outcome: Progressing     Problem: Altered Communication/Language Barrier  Goal: Patient/Family is able to understand and participate in their care  Description: Interventions:- Assess communication ability and preferred communication style- Implement communication aides and strategies- Use visual cues when possible- Listen attentively, be patient, do not interrupt- Minimize distractions- Allow time for understanding and response- Establish method for patient to ask for assistance (call light)- Provide an  as needed- Communicate barriers and strategies to overcome with those who interact with patient  Outcome: Progressing     Problem: GASTROINTESTINAL  - ADULT  Goal: Minimal or absence of nausea and vomiting  Description: INTERVENTIONS:- Maintain adequate hydration with IV or PO as ordered and tolerated- Nasogastric tube to low intermittent suction as ordered- Evaluate effectiveness of ordered antiemetic medications- Provide nonpharmacologic comfort measures as appropriate- Advance diet as tolerated, if ordered- Obtain nutritional consult as needed- Evaluate fluid balance  Outcome: Progressing  Goal: Maintains or returns to baseline bowel function  Description: INTERVENTIONS:- Assess bowel function- Maintain adequate hydration with IV or PO as ordered and tolerated- Evaluate effectiveness of GI medications- Encourage mobilization and activity- Obtain nutritional consult as needed- Establish a toileting routine/schedule- Consider collaborating with pharmacy to review patient's medication profile  Outcome: Progressing     Problem: SKIN/TISSUE INTEGRITY - ADULT  Goal: Skin integrity remains intact  Description: INTERVENTIONS- Assess and document risk factors for pressure ulcer development- Assess and document skin integrity- Monitor for areas of redness and/or skin breakdown- Initiate interventions, skin care algorithm/standards of care as needed  Outcome: Progressing

## 2025-03-20 NOTE — PLAN OF CARE
Problem: Patient Centered Care  Goal: Patient preferences are identified and integrated in the patient's plan of care  Description: Interventions:- What would you like us to know as we care for you? From home alone- Provide timely, complete, and accurate information to patient/family- Incorporate patient and family knowledge, values, beliefs, and cultural backgrounds into the planning and delivery of care- Encourage patient/family to participate in care and decision-making at the level they choose- Honor patient and family perspectives and choices  Outcome: Progressing     Problem: Diabetes/Glucose Control  Goal: Glucose maintained within prescribed range  Description: INTERVENTIONS:- Monitor Blood Glucose as ordered- Assess for signs and symptoms of hyperglycemia and hypoglycemia- Administer ordered medications to maintain glucose within target range- Assess barriers to adequate nutritional intake and initiate nutrition consult as needed- Instruct patient on self management of diabetes  Outcome: Progressing     Problem: Patient/Family Goals  Goal: Patient/Family Long Term Goal  Description: Patient's Long Term Goal: Discharge from the hospital   Interventions:  - Monitor vital signs  - Monitor appropriate labs  - Monitor blood glucose levels  - Pain management  - Administer medications per order  - Follow MD orders  - Diagnostics per order  - Update / inform patient and family on plan of care  - Discharge planning  - See additional Care Plan goals for specific interventions  Outcome: Progressing  Goal: Patient/Family Short Term Goal  Description: Patient's Short Term Goal: Improve abdominal pain and vomiting   Interventions:   - Monitor vital signs  - Monitor appropriate labs  - Monitor blood glucose levels  - Pain management  - Administer medications per order  - Follow MD orders  - Diagnostics per order  - Update / inform patient and family on plan of care- See additional Care Plan goals for specific  interventions  Outcome: Progressing     Problem: PAIN - ADULT  Goal: Verbalizes/displays adequate comfort level or patient's stated pain goal  Description: INTERVENTIONS:- Encourage pt to monitor pain and request assistance- Assess pain using appropriate pain scale- Administer analgesics based on type and severity of pain and evaluate response- Implement non-pharmacological measures as appropriate and evaluate response- Consider cultural and social influences on pain and pain management- Manage/alleviate anxiety- Utilize distraction and/or relaxation techniques- Monitor for opioid side effects- Notify MD/LIP if interventions unsuccessful or patient reports new pain- Anticipate increased pain with activity and pre-medicate as appropriate  Outcome: Progressing     Problem: SAFETY ADULT - FALL  Goal: Free from fall injury  Description: INTERVENTIONS:- Assess pt frequently for physical needs- Identify cognitive and physical deficits and behaviors that affect risk of falls.- [place] fall precautions as indicated by assessment.- Educate pt/family on patient safety including physical limitations- Instruct pt to call for assistance with activity based on assessment- Modify environment to reduce risk of injury- Provide assistive devices as appropriate- Consider OT/PT consult to assist with strengthening/mobility- Encourage toileting schedule  Outcome: Progressing     Problem: DISCHARGE PLANNING  Goal: Discharge to home or other facility with appropriate resources  Description: INTERVENTIONS:- Identify barriers to discharge w/pt and caregiver- Include patient/family/discharge partner in discharge planning- Arrange for needed discharge resources and transportation as appropriate- Identify discharge learning needs (meds, wound care, etc)- Arrange for interpreters to assist at discharge as needed- Consider post-discharge preferences of patient/family/discharge partner- Complete POLST form as appropriate- Assess patient's  ability to be responsible for managing their own health- Refer to Case Management Department for coordinating discharge planning if the patient needs post-hospital services based on physician/LIP order or complex needs related to functional status, cognitive ability or social support system  Outcome: Progressing     Problem: Altered Communication/Language Barrier  Goal: Patient/Family is able to understand and participate in their care  Description: Interventions:- Assess communication ability and preferred communication style- Implement communication aides and strategies- Use visual cues when possible- Listen attentively, be patient, do not interrupt- Minimize distractions- Allow time for understanding and response- Establish method for patient to ask for assistance (call light)- Provide an  as needed- Communicate barriers and strategies to overcome with those who interact with patient  Outcome: Progressing     Problem: GASTROINTESTINAL - ADULT  Goal: Minimal or absence of nausea and vomiting  Description: INTERVENTIONS:- Maintain adequate hydration with IV or PO as ordered and tolerated- Nasogastric tube to low intermittent suction as ordered- Evaluate effectiveness of ordered antiemetic medications- Provide nonpharmacologic comfort measures as appropriate- Advance diet as tolerated, if ordered- Obtain nutritional consult as needed- Evaluate fluid balance  Outcome: Progressing  Goal: Maintains or returns to baseline bowel function  Description: INTERVENTIONS:- Assess bowel function- Maintain adequate hydration with IV or PO as ordered and tolerated- Evaluate effectiveness of GI medications- Encourage mobilization and activity- Obtain nutritional consult as needed- Establish a toileting routine/schedule- Consider collaborating with pharmacy to review patient's medication profile  Outcome: Progressing     Problem: SKIN/TISSUE INTEGRITY - ADULT  Goal: Skin integrity remains intact  Description:  INTERVENTIONS- Assess and document risk factors for pressure ulcer development- Assess and document skin integrity- Monitor for areas of redness and/or skin breakdown- Initiate interventions, skin care algorithm/standards of care as needed  Outcome: Progressing

## 2025-03-20 NOTE — PROGRESS NOTES
[facility]  part of [facility]    Progress Note    Francisco Del Rio Patient Status:  Inpatient    10/24/1945 MRN [id number]   Location [facility] 5SW/SE Attending Omayra Henao MD   Hosp Day # 4 PCP IRIS BOLAÑOS MD     Subjective:  Feels better    Objective/Physical Exam:  General: Alert, orientated x3.  Cooperative.  No apparent distress.  Vital Signs:  Blood pressure 149/68, pulse 71, temperature 97.5 °F (36.4 °C), temperature source Oral, resp. rate 18, height 5' 6\" (1.676 m), weight 179 lb 3.2 oz (81.3 kg), SpO2 94%.  Abdomen:  Soft, non-distended, non-tender, Labs:  Lab Results   Component Value Date    WBC 11.2 (H) 2025    HGB 12.4 (L) 2025    HCT 37.7 (L) 2025    .0 (L) 2025    CREATSERUM 1.08 2025    BUN 21 2025     2025    K 4.5 2025     2025    CO2 28.0 2025     (H) 2025    CA 8.1 (L) 2025    ALB 3.6 2025    ALKPHO 89 2025    BILT 0.7 2025    TP 6.1 2025    AST 25 2025    ALT 33 2025    PTT 22.8 (L) 2025    INR 0.96 2025    KILLIAN 94 2025    LIP 77 (H) 2025       Imaging:  NM GB HEPATOBILIARY SCAN  (CPT=78226)    Result Date: 3/17/2025  PROCEDURE: NM GB HEPATOBILIARY SCAN (CPT=78226)  COMPARISON: [facility], MRI ABDOMEN AND MRCP W/3D (CIN=47035/74882), 3/16/2025, 7:45 AM.  INDICATIONS: pancreatitis   possible cholecystitis.  Follow-up MRCP.  TECHNIQUE: Hepatobiliary imaging was performed after the injection of 8.2 millicuries of Technetium-99m Choletec into the right and vein.   FINDINGS:   LIVER: There is normal uptake of radiotracer by the liver on the immediate and 5 minute images.  BILIARY DUCTS: Normal.  The central biliary structures are seen 10 minutes after tracer injection.  GALLBLADDER: Normal.  The gallbladder was visualized by the 40 minute image.  INTESTINE: Normal, with evidence of  tracer by the 10 minute image.  This is confirmed on subsequent images.          CONCLUSION: Normal examination.     Dictated by (CST): Nilton Arreola MD on 3/17/2025 at 1:06 PM     Finalized by (CST): Nilton Arreola MD on 3/17/2025 at 1:09 PM          CT ABDOMEN+PELVIS(CONTRAST ONLY)(CPT=74177)    Result Date: 3/16/2025  PROCEDURE: CT ABDOMEN + PELVIS (CONTRAST ONLY) (CPT=74177)  COMPARISON: None.  INDICATIONS: Abdominal pain,vomiting  TECHNIQUE: CT images of the abdomen and pelvis were obtained with non-ionic intravenous contrast material.  Automated exposure control for dose reduction was used. Adjustment of the mA and/or kV was done based on the patient's size. Use of iterative reconstruction technique for dose reduction was used.  Dose information is transmitted to the ACR (American College of Radiology) NRDR (National Radiology Data Registry) which includes the Dose Index Registry.  FINDINGS:  LOWER THORAX:  Pulmonary edema.  Cardiomegaly. LIVER:   No enlargement, atrophy, abnormal density, or significant focal lesion. GALLBLADDER: Gallbladder wall thickening.  No radiopaque gallstone. BILIARY:   No visible dilatation or calcification.  PANCREAS:   Diffuse pancreatic edema at surrounding inflammatory changes.  No discrete fluid collection.  No ductal dilatation. SPLEEN:   No enlargement or focal lesion.  ADRENALS:   No mass or enlargement.  KIDNEYS:   No mass, obstruction, or calcification.  RETROPERITONEUM:   No mass or enlarged adenopathy.  Fluid is noted in the anterior pararenal space.  AORTA/VASCULAR:   Infrarenal abdominal aortic penetrating ulcer is seen with aneurysmal dilatation measuring up to 3.1 cm.  Questionable filling defect in the portal vein. PERITONEUM: No free fluid or air. GI TRACT/MESENTERY:    No visible mass, obstruction, or bowel wall thickening. Diverticulosis without evidence of acute diverticulitis. URINARY BLADDER: Unremarkable. REPRODUCTIVE ORGANS: Prostatomegaly measuring 7.2 x 5  x 4 x 6.4 cm. ABDOMINAL WALL:   No acute abnormality.  Fat containing umbilical hernia. BONES:  No acute fracture.  Moderate spondylosis.          CONCLUSION:   1. Moderate to severe acute interstitial pancreatitis.  No discrete peripancreatic fluid collection is identified.  2. Gallbladder wall thickened may be reactive to pancreatitis.  No radiopaque gallstone is seen.  This is evaluated to advantage on subsequent right upper quadrant ultrasound and MRI/MRCP.  3. Questionable filling defect in the portal vein likely relates to venous admixture.  4. Pulmonary edema.  5. Infrarenal abdominal aortic aneurysm at penetrating ulcer measuring 3.1 cm.  6. Prostatomegaly.  7. Colonic diverticulosis. Additional chronic or incidental findings are described in the body of this report.      Preliminary report was given by [facility].  There are no clinically significant discrepancies.    [facility]     Dictated by (CST): Emilio Pretty MD on 3/16/2025 at 1:47 PM     Finalized by (CST): Emilio Pretty MD on 3/16/2025 at 1:53 PM          MRI ABDOMEN AND MRCP W/3D (CPT=74181/17789)    Result Date: 3/16/2025  PROCEDURE: MRI ABDOMEN&MRCP W/3D (CPT=74181/69526)  MRCP   COMPARISON: [facility], CT ABDOMEN + PELVIS (CONTRAST ONLY) (CPT=74177), 3/16/2025, 0:05 AM  INDICATIONS: choledocholithiasis?  TECHNIQUE: A comprehensive examination was performed utilizing a variety of imaging planes and imaging parameters to optimize visualization of suspected pathology.  Images were obtained without contrast.    Magnetic resonance cholangiopancreatography was also performed.    FINDINGS:  LOWER THORAX: Small right pleural effusion.  Cardiomegaly.  LIVER: Normal size and morphology. No evidence of steatosis. No suspicious focal lesion.  GALLBLADDER: Gallbladder wall thickening and edema is noted.  No gallstones. BILIARY TREE: No intra or extrahepatic biliary ductal dilatation.  No filling defects.  PANCREAS: Extensive pancreatic  edema peripancreatic fat stranding.  The pancreatic duct is not dilated.  No discrete fluid collection is identified. SPLEEN: Normal.  No enlargement or focal lesion.  ADRENALS: Normal.  KIDNEYS: Normal.  Bilateral renal cysts. RETROPERITONEUM: Extensive retroperitoneal edema extending down the anterior pararenal space. PERITONEUM: No ascites.  No enlarged lymph nodes. GI TRACT: No evidence of bowel obstruction. AORTA & MAJOR BRANCHES: Normal flow voids. VENOUS SYSTEM: Normal flow voids. ABDOMINAL WALL: Normal.  BONES: No acute fracture.  Mild spondylosis.          CONCLUSION:   1. No gallstones.  No choledocholithiasis.  Gallbladder wall thickening and edema is probably reactive to adjacent pancreatitis, but acute cholecystitis is not excluded.  2. Severe acute interstitial pancreatitis.  No discrete peripancreatic fluid collections are seen.  Due to lack of intravenous contrast, the pancreas is not well evaluated for necrosis.  3. Small right pleural effusion.    [facility]    Dictated by (CST): Emilio Pretty MD on 3/16/2025 at 8:56 AM     Finalized by (CST): Emilio Pretty MD on 3/16/2025 at 9:01 AM          US GALLBLADDER (CPT=76705)    Result Date: 3/16/2025  PROCEDURE: US GALLBLADDER (CPT=76705)  COMPARISON: [facility], CT ABDOMEN + PELVIS (CONTRAST ONLY) (CPT=74177), 3/16/2025, 0:05 AM.  INDICATIONS: Portal veinous thrombosis? also eval for cholecystitis  TECHNIQUE:   The gallbladder was evaluated with grayscale ultrasound.   FINDINGS:   PANCREAS: Not well seen. LIVER:   Normal size, echotexture and color flow.  No masses. Color flow and Doppler imaging of portal and hepatic veins show patency and antegrade flow. BILE DUCTS:   No intrahepatic or extrahepatic biliary ductal dilatation.  Common bile duct measures 2 mm. GALLBLADDER: Gallbladder wall thickening and edema measuring 10 mm.  Mild gallbladder distention.  No gallstones are seen.  The sonographic King sign cannot be assessed due to  prior pain medication administration.  RIGHT KIDNEY: The right kidney measures 7.5 cm long axis. There is no hydronephrosis.  OTHER:   No peritoneal free fluid.          CONCLUSION:   Gallbladder mild distension and wall thickening/edema may indicate acute cholecystitis.  Note no gallstones are identified.  No biliary ductal dilatation.  Suboptimal evaluation of the portal vein is negative for portal venous thrombus.      Preliminary report was given by [facility].  There are no clinically significant discrepancies.    [facility]     Dictated by (CST): Emilio Pretty MD on 3/16/2025 at 7:22 AM     Finalized by (CST): Emilio Pretty MD on 3/16/2025 at 7:25 AM            Assessment/Plan:  Patient Active Problem List   Diagnosis    Type II diabetes mellitus, uncontrolled    Hypercholesterolemia    Hypertension, benign    Tobacco use disorder    Acute renal failure    Herpes zoster    Pancreatitis (HCC)    Acute pancreatitis, unspecified complication status, unspecified pancreatitis type (HCC)    Acute cholecystitis   94 amylase  77 lipase  Will start solids  If tolerates consider discharge  Out pt  CT or US in several weeks    MARTÍN GONZALEZ MD  3/20/2025  12:15 PM

## 2025-03-20 NOTE — PROGRESS NOTES
[facility]  part of [facility]    Progress Note    Francisco Del Rio Patient Status:  Inpatient    10/24/1945 MRN [id number]   Location [facility] 5SW/SE Attending Omayra Henao MD   Hosp Day # 4 PCP IRIS BOLAÑOS MD     Date of Admission:  3/15/2025      Subjective:   Tolerating FLD.  No n/v.  Pain better.      Current Medications:  Current Facility-Administered Medications   Medication Dose Route Frequency    alum-mag hydroxide-simethicone (Maalox) 200-200-20 MG/5ML oral suspension 30 mL  30 mL Oral QID PRN    HYDROcodone-acetaminophen (Norco) 5-325 MG per tab 1 tablet  1 tablet Oral Q6H PRN    ondansetron (Zofran) 4 MG/2ML injection 4 mg  4 mg Intravenous Q6H PRN    cefTRIAXone (Rocephin) 2 g in sodium chloride 0.9% 100 mL IVPB-ADDV  2 g Intravenous Q24H    metroNIDAZOLE in sodium chloride 0.79% (Flagyl) 5 mg/mL IVPB premix 500 mg  500 mg Intravenous Q12H    aspirin DR tab 81 mg  81 mg Oral Daily    lisinopril (Prinivil; Zestril) tab 5 mg  5 mg Oral Daily    pantoprazole (Protonix) DR tab 40 mg  40 mg Oral Daily with breakfast    insulin aspart (NovoLOG) 100 Units/mL FlexPen 1-5 Units  1-5 Units Subcutaneous TID CC    HYDROmorphone (Dilaudid) 1 MG/ML injection 0.5 mg  0.5 mg Intravenous Q3H PRN       ENDOSCOPIC REPORTS:   Colonoscopy:over 5 years ago  EGD: none  ERCP:        Physical Exam:   Blood pressure 149/68, pulse 71, temperature 97.5 °F (36.4 °C), temperature source Oral, resp. rate 18, height 5' 6\" (1.676 m), weight 179 lb 3.2 oz (81.3 kg), SpO2 94%.    General appearance:  alert, appears stated age and cooperative    HEENT: negative findings: lids and lashes normal and conjunctivae and sclerae normal.       Abdominal: soft, bowel sounds present; nondistended, nontender    Extremities: No edema, cyanosis, or clubbing    Skin: warm and dry    Neurologic: Grossly normal    Psychiatric: calm      Results:     Laboratory Data:  Lab Results   Component Value Date    WBC 11.2  (H) 03/20/2025    HGB 12.4 (L) 03/20/2025    HCT 37.7 (L) 03/20/2025    .0 (L) 03/20/2025    CREATSERUM 1.08 03/20/2025    BUN 21 03/20/2025     03/20/2025    K 4.5 03/20/2025     03/20/2025    CO2 28.0 03/20/2025     (H) 03/20/2025    CA 8.1 (L) 03/20/2025    ALB 3.6 03/19/2025    ALKPHO 89 03/19/2025    TP 6.1 03/19/2025    AST 25 03/19/2025    ALT 33 03/19/2025    PTT 22.8 (L) 03/16/2025    INR 0.96 03/16/2025    PTP 13.4 03/16/2025    KILLIAN 94 03/20/2025    LIP 77 (H) 03/20/2025    TROPHS 5 03/15/2025         Imaging:  No results found.       Impression:   1)Acute pancreatitis, unspecified complication status, ? gallstone pancreatitis however no cholelithiasis on imaging noted.  MRI/MRCP w/o choledocholithiasis.    - diet advanced this afternoon to low fat  - HIDA negative for cholecystitis.   - consider stopping Abx without e/o cholecystitis.  No cross sectional imaging to suggest pancreatitis with necrosis.    - as no e/o cholelithiasis, consider for outpatient MRI  pancreas in 4 weeks.        3)elevated ast/alt/alk phos - acute elevated in setting as noted above (normal in 11/2024); ast> alt with alk phos > alt supporting cholestatic pattern  -serially improving.  Suspect passed gallstone ? However no cholelithiasis on US/MRI    4) ANDRES on CKD  Now normalized.     5) Leukocytosis suspect reactive from pancreatitis  - improved  - consider stopping Abx w/o e/o cholecystitis    Ok for discharge from GI standpoint if tolerates solids this afternoon.   Pt to follow up with Dr. Schneider as outpatient in 2 weeks after discharge.        Thank you for allowing me to participate in the care of your patient.      DO Sylvester Swanson [facility]  Gastroenterology

## 2025-03-20 NOTE — PLAN OF CARE
From home alone. HHC and meals on wheels set up for patient.   Problem: Patient Centered Care  Goal: Patient preferences are identified and integrated in the patient's plan of care  Description: Interventions:- What would you like us to know as we care for you? From home alone- Provide timely, complete, and accurate information to patient/family- Incorporate patient and family knowledge, values, beliefs, and cultural backgrounds into the planning and delivery of care- Encourage patient/family to participate in care and decision-making at the level they choose- Honor patient and family perspectives and choices  Outcome: Progressing     Problem: Diabetes/Glucose Control  Goal: Glucose maintained within prescribed range  Description: INTERVENTIONS:- Monitor Blood Glucose as ordered- Assess for signs and symptoms of hyperglycemia and hypoglycemia- Administer ordered medications to maintain glucose within target range- Assess barriers to adequate nutritional intake and initiate nutrition consult as needed- Instruct patient on self management of diabetes  Outcome: Progressing     Problem: Patient/Family Goals  Goal: Patient/Family Long Term Goal  Description: Patient's Long Term Goal: Discharge from the hospital   Interventions:  - Monitor vital signs  - Monitor appropriate labs  - Monitor blood glucose levels  - Pain management  - Administer medications per order  - Follow MD orders  - Diagnostics per order  - Update / inform patient and family on plan of care  - Discharge planning  - See additional Care Plan goals for specific interventions  Outcome: Progressing  Goal: Patient/Family Short Term Goal  Description: Patient's Short Term Goal: Improve abdominal pain and vomiting   Interventions:   - Monitor vital signs  - Monitor appropriate labs  - Monitor blood glucose levels  - Pain management  - Administer medications per order  - Follow MD orders  - Diagnostics per order  - Update / inform patient and family on plan of  care- See additional Care Plan goals for specific interventions  Outcome: Progressing     Problem: PAIN - ADULT  Goal: Verbalizes/displays adequate comfort level or patient's stated pain goal  Description: INTERVENTIONS:- Encourage pt to monitor pain and request assistance- Assess pain using appropriate pain scale- Administer analgesics based on type and severity of pain and evaluate response- Implement non-pharmacological measures as appropriate and evaluate response- Consider cultural and social influences on pain and pain management- Manage/alleviate anxiety- Utilize distraction and/or relaxation techniques- Monitor for opioid side effects- Notify MD/LIP if interventions unsuccessful or patient reports new pain- Anticipate increased pain with activity and pre-medicate as appropriate  Outcome: Progressing     Problem: SAFETY ADULT - FALL  Goal: Free from fall injury  Description: INTERVENTIONS:- Assess pt frequently for physical needs- Identify cognitive and physical deficits and behaviors that affect risk of falls.- [place] fall precautions as indicated by assessment.- Educate pt/family on patient safety including physical limitations- Instruct pt to call for assistance with activity based on assessment- Modify environment to reduce risk of injury- Provide assistive devices as appropriate- Consider OT/PT consult to assist with strengthening/mobility- Encourage toileting schedule  Outcome: Progressing     Problem: DISCHARGE PLANNING  Goal: Discharge to home or other facility with appropriate resources  Description: INTERVENTIONS:- Identify barriers to discharge w/pt and caregiver- Include patient/family/discharge partner in discharge planning- Arrange for needed discharge resources and transportation as appropriate- Identify discharge learning needs (meds, wound care, etc)- Arrange for interpreters to assist at discharge as needed- Consider post-discharge preferences of patient/family/discharge partner- Complete  POLST form as appropriate- Assess patient's ability to be responsible for managing their own health- Refer to Case Management Department for coordinating discharge planning if the patient needs post-hospital services based on physician/LIP order or complex needs related to functional status, cognitive ability or social support system  Outcome: Progressing     Problem: Altered Communication/Language Barrier  Goal: Patient/Family is able to understand and participate in their care  Description: Interventions:- Assess communication ability and preferred communication style- Implement communication aides and strategies- Use visual cues when possible- Listen attentively, be patient, do not interrupt- Minimize distractions- Allow time for understanding and response- Establish method for patient to ask for assistance (call light)- Provide an  as needed- Communicate barriers and strategies to overcome with those who interact with patient  Outcome: Progressing     Problem: GASTROINTESTINAL - ADULT  Goal: Minimal or absence of nausea and vomiting  Description: INTERVENTIONS:- Maintain adequate hydration with IV or PO as ordered and tolerated- Nasogastric tube to low intermittent suction as ordered- Evaluate effectiveness of ordered antiemetic medications- Provide nonpharmacologic comfort measures as appropriate- Advance diet as tolerated, if ordered- Obtain nutritional consult as needed- Evaluate fluid balance  Outcome: Progressing  Goal: Maintains or returns to baseline bowel function  Description: INTERVENTIONS:- Assess bowel function- Maintain adequate hydration with IV or PO as ordered and tolerated- Evaluate effectiveness of GI medications- Encourage mobilization and activity- Obtain nutritional consult as needed- Establish a toileting routine/schedule- Consider collaborating with pharmacy to review patient's medication profile  Outcome: Progressing     Problem: SKIN/TISSUE INTEGRITY - ADULT  Goal: Skin  integrity remains intact  Description: INTERVENTIONS- Assess and document risk factors for pressure ulcer development- Assess and document skin integrity- Monitor for areas of redness and/or skin breakdown- Initiate interventions, skin care algorithm/standards of care as needed  Outcome: Progressing

## 2025-03-20 NOTE — PROGRESS NOTES
[facility]  part of [facility]    Progress Note    Francisco Del Rio Patient Status:  Inpatient    10/24/1945 MRN [id number]   Location [facility] 5SW/SE Attending Omayra Henao MD   Hosp Day # 4 PCP IRIS BOLAÑOS MD     Chief Complaint: Abd pain    Subjective:     Constitutional: Negative.    HENT: Negative.     Eyes: Negative.    Respiratory: Negative.     Cardiovascular: Negative.    Gastrointestinal: Negative.  Negative for abdominal pain.   Endocrine: Negative.    Musculoskeletal: Negative.    Allergic/Immunologic: Negative.    Neurological: Negative.    Hematological: Negative.    Psychiatric/Behavioral: Negative.         Objective:   Blood pressure 158/76, pulse 63, temperature 98.4 °F (36.9 °C), temperature source Oral, resp. rate 20, height 5' 6\" (1.676 m), weight 179 lb 3.2 oz (81.3 kg), SpO2 94%.  Physical Exam  Vitals and nursing note reviewed.   HENT:      Head: Normocephalic and atraumatic.   Eyes:      Pupils: Pupils are equal, round, and reactive to light.   Cardiovascular:      Rate and Rhythm: Normal rate and regular rhythm.      Pulses: Normal pulses.      Heart sounds: Normal heart sounds.   Pulmonary:      Effort: Pulmonary effort is normal.      Breath sounds: Normal breath sounds.   Abdominal:      General: Abdomen is flat. Bowel sounds are normal.      Palpations: Abdomen is soft.   Musculoskeletal:      Cervical back: Neck supple.   Skin:     General: Skin is warm.   Neurological:      General: No focal deficit present.      Mental Status: He is alert.   Psychiatric:         Mood and Affect: Mood normal.         Results:   Lab Results   Component Value Date    WBC 16.3 (H) 2025    HGB 13.3 2025    HCT 39.8 2025    .0 (L) 2025    CREATSERUM 1.24 2025    BUN 23 2025    BUN 23 2025     2025    K 4.1 2025     2025    CO2 27.0 2025     (H) 2025    CA 8.4 (L)  03/18/2025    ALB 3.6 03/19/2025    ALKPHO 89 03/19/2025    BILT 0.7 03/19/2025    TP 6.1 03/19/2025    AST 25 03/19/2025    ALT 33 03/19/2025    PTT 22.8 (L) 03/16/2025    INR 0.96 03/16/2025    KILLIAN 94 03/20/2025    LIP 77 (H) 03/20/2025    TROPHS 5 03/15/2025       No results found.        Assessment & Plan:       Acute pancreatitis, unspecified complication status, unspecified pancreatitis type (HCC)  NPO, IV fluids ,HIDA scan today, negative    Acute cholecystitis, advance as tolerated  Pain control  HTN BP stable  HLD on statin  DM monitor   DC home soon if tolerating PO  Time spent 35 mins

## 2025-03-20 NOTE — CM/SW NOTE
03/16/25 0408   Choice of Post-Acute Provider   Informed patient of right to choose their preferred provider Yes   List of appropriate post-acute services provided to patient/family with quality data No - Declined list   Patient/family choice [facility]     SW followed up on discharge planning.    Per RN round, PT/OT worked with pt and he did very well.    Anticipated therapy need: Home.     Doc from [facility] confirmed they can accommodate pt's request for [place] speaking staff.    PLAN: DC home w/[facility] pending med clear    / to remain available for support and/or discharge planning.     Gwendolyn PERES, LSW  Discharge Planner

## 2025-03-21 VITALS
OXYGEN SATURATION: 94 % | RESPIRATION RATE: 18 BRPM | HEIGHT: 66 IN | BODY MASS INDEX: 28.8 KG/M2 | WEIGHT: 179.19 LBS | HEART RATE: 62 BPM | TEMPERATURE: 98 F | DIASTOLIC BLOOD PRESSURE: 63 MMHG | SYSTOLIC BLOOD PRESSURE: 134 MMHG

## 2025-03-21 LAB
GLUCOSE BLDC GLUCOMTR-MCNC: 151 MG/DL (ref 70–99)
GLUCOSE BLDC GLUCOMTR-MCNC: 184 MG/DL (ref 70–99)

## 2025-03-21 PROCEDURE — 82962 GLUCOSE BLOOD TEST: CPT

## 2025-03-21 PROCEDURE — 94760 N-INVAS EAR/PLS OXIMETRY 1: CPT

## 2025-03-21 RX ORDER — HYDROCODONE BITARTRATE AND ACETAMINOPHEN 5; 325 MG/1; MG/1
1 TABLET ORAL EVERY 6 HOURS PRN
Qty: 15 TABLET | Refills: 0 | Status: SHIPPED | OUTPATIENT
Start: 2025-03-21

## 2025-03-21 NOTE — PLAN OF CARE
Patient discharged to home with transportation provided by son. PIV removed. AVS reviewed with patient/family with all questions answered. All patient belongings returned at discharge.     Problem: Patient Centered Care  Goal: Patient preferences are identified and integrated in the patient's plan of care  Description: Interventions:- What would you like us to know as we care for you? From home alone- Provide timely, complete, and accurate information to patient/family- Incorporate patient and family knowledge, values, beliefs, and cultural backgrounds into the planning and delivery of care- Encourage patient/family to participate in care and decision-making at the level they choose- Honor patient and family perspectives and choices  Outcome: Adequate for Discharge     Problem: Diabetes/Glucose Control  Goal: Glucose maintained within prescribed range  Description: INTERVENTIONS:- Monitor Blood Glucose as ordered- Assess for signs and symptoms of hyperglycemia and hypoglycemia- Administer ordered medications to maintain glucose within target range- Assess barriers to adequate nutritional intake and initiate nutrition consult as needed- Instruct patient on self management of diabetes  Outcome: Adequate for Discharge     Problem: Patient/Family Goals  Goal: Patient/Family Long Term Goal  Description: Patient's Long Term Goal: Discharge from the hospital   Interventions:  - Monitor vital signs  - Monitor appropriate labs  - Monitor blood glucose levels  - Pain management  - Administer medications per order  - Follow MD orders  - Diagnostics per order  - Update / inform patient and family on plan of care  - Discharge planning  - See additional Care Plan goals for specific interventions  Outcome: Adequate for Discharge  Goal: Patient/Family Short Term Goal  Description: Patient's Short Term Goal: Improve abdominal pain and vomiting   Interventions:   - Monitor vital signs  - Monitor appropriate labs  - Monitor blood  glucose levels  - Pain management  - Administer medications per order  - Follow MD orders  - Diagnostics per order  - Update / inform patient and family on plan of care- See additional Care Plan goals for specific interventions  Outcome: Adequate for Discharge     Problem: PAIN - ADULT  Goal: Verbalizes/displays adequate comfort level or patient's stated pain goal  Description: INTERVENTIONS:- Encourage pt to monitor pain and request assistance- Assess pain using appropriate pain scale- Administer analgesics based on type and severity of pain and evaluate response- Implement non-pharmacological measures as appropriate and evaluate response- Consider cultural and social influences on pain and pain management- Manage/alleviate anxiety- Utilize distraction and/or relaxation techniques- Monitor for opioid side effects- Notify MD/LIP if interventions unsuccessful or patient reports new pain- Anticipate increased pain with activity and pre-medicate as appropriate  Outcome: Adequate for Discharge     Problem: SAFETY ADULT - FALL  Goal: Free from fall injury  Description: INTERVENTIONS:- Assess pt frequently for physical needs- Identify cognitive and physical deficits and behaviors that affect risk of falls.- [place] fall precautions as indicated by assessment.- Educate pt/family on patient safety including physical limitations- Instruct pt to call for assistance with activity based on assessment- Modify environment to reduce risk of injury- Provide assistive devices as appropriate- Consider OT/PT consult to assist with strengthening/mobility- Encourage toileting schedule  Outcome: Adequate for Discharge     Problem: DISCHARGE PLANNING  Goal: Discharge to home or other facility with appropriate resources  Description: INTERVENTIONS:- Identify barriers to discharge w/pt and caregiver- Include patient/family/discharge partner in discharge planning- Arrange for needed discharge resources and transportation as appropriate-  Identify discharge learning needs (meds, wound care, etc)- Arrange for interpreters to assist at discharge as needed- Consider post-discharge preferences of patient/family/discharge partner- Complete POLST form as appropriate- Assess patient's ability to be responsible for managing their own health- Refer to Case Management Department for coordinating discharge planning if the patient needs post-hospital services based on physician/LIP order or complex needs related to functional status, cognitive ability or social support system  Outcome: Adequate for Discharge     Problem: Altered Communication/Language Barrier  Goal: Patient/Family is able to understand and participate in their care  Description: Interventions:- Assess communication ability and preferred communication style- Implement communication aides and strategies- Use visual cues when possible- Listen attentively, be patient, do not interrupt- Minimize distractions- Allow time for understanding and response- Establish method for patient to ask for assistance (call light)- Provide an  as needed- Communicate barriers and strategies to overcome with those who interact with patient  Outcome: Adequate for Discharge     Problem: GASTROINTESTINAL - ADULT  Goal: Minimal or absence of nausea and vomiting  Description: INTERVENTIONS:- Maintain adequate hydration with IV or PO as ordered and tolerated- Nasogastric tube to low intermittent suction as ordered- Evaluate effectiveness of ordered antiemetic medications- Provide nonpharmacologic comfort measures as appropriate- Advance diet as tolerated, if ordered- Obtain nutritional consult as needed- Evaluate fluid balance  Outcome: Adequate for Discharge  Goal: Maintains or returns to baseline bowel function  Description: INTERVENTIONS:- Assess bowel function- Maintain adequate hydration with IV or PO as ordered and tolerated- Evaluate effectiveness of GI medications- Encourage mobilization and activity-  Obtain nutritional consult as needed- Establish a toileting routine/schedule- Consider collaborating with pharmacy to review patient's medication profile  Outcome: Adequate for Discharge     Problem: SKIN/TISSUE INTEGRITY - ADULT  Goal: Skin integrity remains intact  Description: INTERVENTIONS- Assess and document risk factors for pressure ulcer development- Assess and document skin integrity- Monitor for areas of redness and/or skin breakdown- Initiate interventions, skin care algorithm/standards of care as needed  Outcome: Adequate for Discharge

## 2025-03-21 NOTE — PROGRESS NOTES
[facility]  part of [facility]    Progress Note    Francisco Del Rio Patient Status:  Inpatient    10/24/1945 MRN [id number]   Location [facility] 5SW/SE Attending Omayra Henao MD   Hosp Day # 5 PCP IRIS BOLAÑOS MD     Date of Admission:  3/15/2025      Subjective:   Tolerating solid diet. No abd pain. No n/v      Current Medications:  Current Facility-Administered Medications   Medication Dose Route Frequency    alum-mag hydroxide-simethicone (Maalox) 200-200-20 MG/5ML oral suspension 30 mL  30 mL Oral QID PRN    HYDROcodone-acetaminophen (Norco) 5-325 MG per tab 1 tablet  1 tablet Oral Q6H PRN    ondansetron (Zofran) 4 MG/2ML injection 4 mg  4 mg Intravenous Q6H PRN    cefTRIAXone (Rocephin) 2 g in sodium chloride 0.9% 100 mL IVPB-ADDV  2 g Intravenous Q24H    metroNIDAZOLE in sodium chloride 0.79% (Flagyl) 5 mg/mL IVPB premix 500 mg  500 mg Intravenous Q12H    aspirin DR tab 81 mg  81 mg Oral Daily    lisinopril (Prinivil; Zestril) tab 5 mg  5 mg Oral Daily    pantoprazole (Protonix) DR tab 40 mg  40 mg Oral Daily with breakfast    insulin aspart (NovoLOG) 100 Units/mL FlexPen 1-5 Units  1-5 Units Subcutaneous TID CC    HYDROmorphone (Dilaudid) 1 MG/ML injection 0.5 mg  0.5 mg Intravenous Q3H PRN       ENDOSCOPIC REPORTS:   Colonoscopy:over 5 years ago  EGD: none  ERCP:        Physical Exam:   Blood pressure 134/63, pulse 62, temperature 98.2 °F (36.8 °C), temperature source Oral, resp. rate 18, height 5' 6\" (1.676 m), weight 179 lb 3.2 oz (81.3 kg), SpO2 94%.    General appearance:  alert, appears stated age and cooperative    HEENT: negative findings: lids and lashes normal and conjunctivae and sclerae normal.       Abdominal: soft, bowel sounds present; nondistended, nontender    Extremities: No edema, cyanosis, or clubbing    Skin: warm and dry    Neurologic: Grossly normal    Psychiatric: calm      Results:     Laboratory Data:  Lab Results   Component Value Date    WBC  11.2 (H) 03/20/2025    HGB 12.4 (L) 03/20/2025    HCT 37.7 (L) 03/20/2025    .0 (L) 03/20/2025    CREATSERUM 1.08 03/20/2025    BUN 21 03/20/2025     03/20/2025    K 4.5 03/20/2025     03/20/2025    CO2 28.0 03/20/2025     (H) 03/20/2025    CA 8.1 (L) 03/20/2025    ALB 3.6 03/19/2025    ALKPHO 89 03/19/2025    TP 6.1 03/19/2025    AST 25 03/19/2025    ALT 33 03/19/2025    PTT 22.8 (L) 03/16/2025    INR 0.96 03/16/2025    PTP 13.4 03/16/2025    KILLIAN 94 03/20/2025    LIP 77 (H) 03/20/2025    TROPHS 5 03/15/2025         Imaging:  No results found.       Impression:   1)Acute pancreatitis, unspecified complication status, ? gallstone pancreatitis however no cholelithiasis on imaging noted.  MRI/MRCP w/o choledocholithiasis.   HIDA negative for cholecystitis. As no e/o cholelithiasis, consider for outpatient MRI  pancreas in 4 weeks.        3)elevated ast/alt/alk phos - acute elevated in setting as noted above (normal in 11/2024); ast> alt with alk phos > alt supporting cholestatic pattern  -serially improving.  Suspect passed gallstone ? However no cholelithiasis on US/MRI    4) Leukocytosis suspect reactive from pancreatitis  - improved    Ok for discharge from GI standpoint with low fat diet   As no e/o cholelithiasis, consider for outpatient MRI  pancreas in 4 weeks.    Pt to follow up with Dr. Schneider as outpatient in 2 weeks after discharge.        Thank you for allowing me to participate in the care of your patient.      DO Sylvester Swanson [facility]  Gastroenterology

## 2025-03-21 NOTE — DISCHARGE INSTRUCTIONS
Sometimes managing your health at home requires assistance.  The [facility] team has recognized your preference to use [facility].  They can be reached at  [phone].   A representative from the home health agency will contact you or your family to schedule your first visit.       [facility] follow-up (Meals on Wheels)    [phone], option 2 for seniors

## 2025-03-21 NOTE — PROGRESS NOTES
[facility]  part of [facility]    Progress Note    Francisco Del Rio Patient Status:  Inpatient    10/24/1945 MRN [id number]   Location [facility] 5SW/SE Attending Omayra Henao MD   Hosp Day # 5 PCP IRIS BOLAÑOS MD     Subjective:  Feels ok   tolerated solids    Objective/Physical Exam:  General: Alert, orientated x3.  Cooperative.  No apparent distress.  Vital Signs:  Blood pressure 129/50, pulse 69, temperature 98.4 °F (36.9 °C), temperature source Oral, resp. rate 16, height 5' 6\" (1.676 m), weight 179 lb 3.2 oz (81.3 kg), SpO2 96%.  Abdomen:  Soft, non-distended, non-tender,   Labs:  Lab Results   Component Value Date    WBC 11.2 (H) 2025    HGB 12.4 (L) 2025    HCT 37.7 (L) 2025    .0 (L) 2025    CREATSERUM 1.08 2025    BUN 21 2025     2025    K 4.5 2025     2025    CO2 28.0 2025     (H) 2025    CA 8.1 (L) 2025    ALB 3.6 2025    ALKPHO 89 2025    BILT 0.7 2025    TP 6.1 2025    AST 25 2025    ALT 33 2025    PTT 22.8 (L) 2025    INR 0.96 2025    KILLIAN 94 2025    LIP 77 (H) 2025       Imaging:  NM GB HEPATOBILIARY SCAN  (CPT=78226)    Result Date: 3/17/2025  PROCEDURE: NM GB HEPATOBILIARY SCAN (CPT=78226)  COMPARISON: [facility], MRI ABDOMEN AND MRCP W/3D (TMW=40015/69239), 3/16/2025, 7:45 AM.  INDICATIONS: pancreatitis   possible cholecystitis.  Follow-up MRCP.  TECHNIQUE: Hepatobiliary imaging was performed after the injection of 8.2 millicuries of Technetium-99m Choletec into the right and vein.   FINDINGS:   LIVER: There is normal uptake of radiotracer by the liver on the immediate and 5 minute images.  BILIARY DUCTS: Normal.  The central biliary structures are seen 10 minutes after tracer injection.  GALLBLADDER: Normal.  The gallbladder was visualized by the 40 minute image.  INTESTINE: Normal, with  evidence of tracer by the 10 minute image.  This is confirmed on subsequent images.          CONCLUSION: Normal examination.     Dictated by (CST): Nilton Arreola MD on 3/17/2025 at 1:06 PM     Finalized by (CST): Nilton Arreola MD on 3/17/2025 at 1:09 PM          CT ABDOMEN+PELVIS(CONTRAST ONLY)(CPT=74177)    Result Date: 3/16/2025  PROCEDURE: CT ABDOMEN + PELVIS (CONTRAST ONLY) (CPT=74177)  COMPARISON: None.  INDICATIONS: Abdominal pain,vomiting  TECHNIQUE: CT images of the abdomen and pelvis were obtained with non-ionic intravenous contrast material.  Automated exposure control for dose reduction was used. Adjustment of the mA and/or kV was done based on the patient's size. Use of iterative reconstruction technique for dose reduction was used.  Dose information is transmitted to the ACR (American College of Radiology) NRDR (National Radiology Data Registry) which includes the Dose Index Registry.  FINDINGS:  LOWER THORAX:  Pulmonary edema.  Cardiomegaly. LIVER:   No enlargement, atrophy, abnormal density, or significant focal lesion. GALLBLADDER: Gallbladder wall thickening.  No radiopaque gallstone. BILIARY:   No visible dilatation or calcification.  PANCREAS:   Diffuse pancreatic edema at surrounding inflammatory changes.  No discrete fluid collection.  No ductal dilatation. SPLEEN:   No enlargement or focal lesion.  ADRENALS:   No mass or enlargement.  KIDNEYS:   No mass, obstruction, or calcification.  RETROPERITONEUM:   No mass or enlarged adenopathy.  Fluid is noted in the anterior pararenal space.  AORTA/VASCULAR:   Infrarenal abdominal aortic penetrating ulcer is seen with aneurysmal dilatation measuring up to 3.1 cm.  Questionable filling defect in the portal vein. PERITONEUM: No free fluid or air. GI TRACT/MESENTERY:    No visible mass, obstruction, or bowel wall thickening. Diverticulosis without evidence of acute diverticulitis. URINARY BLADDER: Unremarkable. REPRODUCTIVE ORGANS: Prostatomegaly  measuring 7.2 x 5 x 4 x 6.4 cm. ABDOMINAL WALL:   No acute abnormality.  Fat containing umbilical hernia. BONES:  No acute fracture.  Moderate spondylosis.          CONCLUSION:   1. Moderate to severe acute interstitial pancreatitis.  No discrete peripancreatic fluid collection is identified.  2. Gallbladder wall thickened may be reactive to pancreatitis.  No radiopaque gallstone is seen.  This is evaluated to advantage on subsequent right upper quadrant ultrasound and MRI/MRCP.  3. Questionable filling defect in the portal vein likely relates to venous admixture.  4. Pulmonary edema.  5. Infrarenal abdominal aortic aneurysm at penetrating ulcer measuring 3.1 cm.  6. Prostatomegaly.  7. Colonic diverticulosis. Additional chronic or incidental findings are described in the body of this report.      Preliminary report was given by [facility].  There are no clinically significant discrepancies.    [facility]     Dictated by (CST): Emilio Pretty MD on 3/16/2025 at 1:47 PM     Finalized by (CST): Emilio Pretty MD on 3/16/2025 at 1:53 PM          MRI ABDOMEN AND MRCP W/3D (CPT=74181/48457)    Result Date: 3/16/2025  PROCEDURE: MRI ABDOMEN&MRCP W/3D (CPT=74181/04960)  MRCP   COMPARISON: [facility], CT ABDOMEN + PELVIS (CONTRAST ONLY) (CPT=74177), 3/16/2025, 0:05 AM  INDICATIONS: choledocholithiasis?  TECHNIQUE: A comprehensive examination was performed utilizing a variety of imaging planes and imaging parameters to optimize visualization of suspected pathology.  Images were obtained without contrast.    Magnetic resonance cholangiopancreatography was also performed.    FINDINGS:  LOWER THORAX: Small right pleural effusion.  Cardiomegaly.  LIVER: Normal size and morphology. No evidence of steatosis. No suspicious focal lesion.  GALLBLADDER: Gallbladder wall thickening and edema is noted.  No gallstones. BILIARY TREE: No intra or extrahepatic biliary ductal dilatation.  No filling defects.   PANCREAS: Extensive pancreatic edema peripancreatic fat stranding.  The pancreatic duct is not dilated.  No discrete fluid collection is identified. SPLEEN: Normal.  No enlargement or focal lesion.  ADRENALS: Normal.  KIDNEYS: Normal.  Bilateral renal cysts. RETROPERITONEUM: Extensive retroperitoneal edema extending down the anterior pararenal space. PERITONEUM: No ascites.  No enlarged lymph nodes. GI TRACT: No evidence of bowel obstruction. AORTA & MAJOR BRANCHES: Normal flow voids. VENOUS SYSTEM: Normal flow voids. ABDOMINAL WALL: Normal.  BONES: No acute fracture.  Mild spondylosis.          CONCLUSION:   1. No gallstones.  No choledocholithiasis.  Gallbladder wall thickening and edema is probably reactive to adjacent pancreatitis, but acute cholecystitis is not excluded.  2. Severe acute interstitial pancreatitis.  No discrete peripancreatic fluid collections are seen.  Due to lack of intravenous contrast, the pancreas is not well evaluated for necrosis.  3. Small right pleural effusion.    [facility]    Dictated by (CST): Emilio Pretty MD on 3/16/2025 at 8:56 AM     Finalized by (CST): Emilio Pretty MD on 3/16/2025 at 9:01 AM          US GALLBLADDER (CPT=76705)    Result Date: 3/16/2025  PROCEDURE: US GALLBLADDER (CPT=76705)  COMPARISON: [facility], CT ABDOMEN + PELVIS (CONTRAST ONLY) (CPT=74177), 3/16/2025, 0:05 AM.  INDICATIONS: Portal veinous thrombosis? also eval for cholecystitis  TECHNIQUE:   The gallbladder was evaluated with grayscale ultrasound.   FINDINGS:   PANCREAS: Not well seen. LIVER:   Normal size, echotexture and color flow.  No masses. Color flow and Doppler imaging of portal and hepatic veins show patency and antegrade flow. BILE DUCTS:   No intrahepatic or extrahepatic biliary ductal dilatation.  Common bile duct measures 2 mm. GALLBLADDER: Gallbladder wall thickening and edema measuring 10 mm.  Mild gallbladder distention.  No gallstones are seen.  The sonographic King  sign cannot be assessed due to prior pain medication administration.  RIGHT KIDNEY: The right kidney measures 7.5 cm long axis. There is no hydronephrosis.  OTHER:   No peritoneal free fluid.          CONCLUSION:   Gallbladder mild distension and wall thickening/edema may indicate acute cholecystitis.  Note no gallstones are identified.  No biliary ductal dilatation.  Suboptimal evaluation of the portal vein is negative for portal venous thrombus.      Preliminary report was given by [facility].  There are no clinically significant discrepancies.    [facility]     Dictated by (CST): Emilio Pretty MD on 3/16/2025 at 7:22 AM     Finalized by (CST): Emilio Pretty MD on 3/16/2025 at 7:25 AM            Assessment/Plan:  Patient Active Problem List   Diagnosis    Type II diabetes mellitus, uncontrolled    Hypercholesterolemia    Hypertension, benign    Tobacco use disorder    Acute renal failure    Herpes zoster    Pancreatitis (HCC)    Acute pancreatitis, unspecified complication status, unspecified pancreatitis type (HCC)    Acute cholecystitis   Resolved acute pancreatitis  Ok for discharge from surgical standpoint  Agree with outpt f/u with possible MRI pancreas and/or US ,CT  Need to watch for pseudocyst  Or cholelithiasis  Advise lo fat  diet   MARTÍN GONZALEZ MD  3/21/2025  8:59 AM

## 2025-03-21 NOTE — CM/SW NOTE
03/21/25 0900   Discharge disposition   Expected discharge disposition Home-Health   Post Acute Care Provider   ([facility])   Discharge transportation Private car     QUIN confirmed with RN Ani who stated pt is medically ready for discharge today.  DC order placed.    HH updates attached via [facility] to [facility]. Doc from [facility] made aware of discharge through [facility] Messages.    QUIN confirmed that [facility] contact information added to AVS.    PLAN: DC home with [facility]    Gwendolyn PERES, LSW  Discharge Planner

## 2025-03-21 NOTE — DISCHARGE SUMMARY
[facility]  part of [facility]    Discharge Summary    Francisco Del Rio Patient Status:  Inpatient    10/24/1945 MRN [id number]   Location [facility] 5SW/SE Attending Omayra Henao MD   Hosp Day # 5 PCP IRIS BOLAÑOS MD     Date of Admission: 3/15/2025   Date of Discharge: 3/21/2025    Admitting Diagnosis: Acute cholecystitis [K81.0]  Acute pancreatitis, unspecified complication status, unspecified pancreatitis type (HCC) [K85.90]    Disposition: Home    Discharge Diagnosis: .Principal Problem:    Acute pancreatitis, unspecified complication status, unspecified pancreatitis type (HCC)  Active Problems:    Pancreatitis (HCC)    Acute cholecystitis      Hospital Course:   Reason for Admission: Abdominal pain    Discharge Physical Exam: General appearance: alert, appears stated age, and cooperative  Head: Normocephalic, without obvious abnormality, atraumatic  Pulmonary:  clear to auscultation bilaterally  Cardiovascular: S1, S2 normal, no murmur, click, rub or gallop, regular rate and rhythm, S1, S2 normal  Abdominal: soft, non-tender; bowel sounds normal; no masses,  no organomegaly  Extremities: extremities normal, atraumatic, no cyanosis or edema  Pulses: 2+ and symmetric  Skin: Skin color, texture, turgor normal. No rashes or lesions    Hospital Course: Pt seen by GI started on feedings, tolerating well DC home    Complications: None    Consultants         Provider   Role Specialty     Navdeep Borrero MD      Consulting Physician SURGERY, GENERAL     Avani Schneider MD      Consulting Physician GASTROENTEROLOGY                Discharge Plan:   Discharge Condition: Stable    Current Discharge Medication List        New Orders    Details   HYDROcodone-acetaminophen 5-325 MG Oral Tab Take 1 tablet by mouth every 6 (six) hours as needed.           Home Meds - Unchanged    Details   simvastatin 40 MG Oral Tab Take 1 tablet (40 mg total) by mouth nightly.      Pantoprazole Sodium 40 MG  Oral Tab EC Take 1 tablet (40 mg total) by mouth once daily.      lisinopril 10 MG Oral Tab Take 0.5 tablets (5 mg total) by mouth daily.      MetFORMIN HCl 500 MG Oral Tab 1/2 tab PO q12 hrs. Pre meals.      Glucose Blood (FREESTYLE LITE TEST) In Vitro Strip Dx. 250.02. take by Misc.(Non-Drug; Combo Route) route twice a day      FreeStyle Lancets Does not apply Misc as directed      Blood Glucose Monitoring Suppl (FREESTYLE LITE) Does not apply Device as directed      aspirin 81 MG Oral Tab Take 1 tablet by mouth daily.                 Discharge Diet: As tolerated    Discharge Activity: As tolerated    Follow up:     Time spent 35 min        HERMAN COOK, MD YORDY  3/21/2025

## 2025-03-25 NOTE — PAYOR COMM NOTE
--------------  DISCHARGE REVIEW    Payor: [employer]  Subscriber #:  [id number]  Authorization Number: [id number]    Admit date: 3/16/25  Admit time:   4:05 AM  Discharge Date: 3/21/2025  1:58 PM     Admitting Physician: Omayra Henao MD  Attending Physician:  No att. providers found  Primary Care Physician: Iris Frye MD          Discharge Summary Notes        Discharge Summary signed by Omayra Henao MD at 3/21/2025  8:52 AM       Author: Omayra Henao MD Specialty: Internal Medicine Author Type: Physician    Filed: 3/21/2025  8:52 AM Date of Service: 3/21/2025  8:51 AM Status: Signed    : Omayra Henao MD (Physician)         [facility]  part of [facility]    Discharge Summary    Francisco Del Rio Patient Status:  Inpatient    10/24/1945 MRN [id number]   Location [facility] 5SW/SE Attending Omayra Henao MD   Hosp Day # 5 PCP IRIS FRYE MD     Date of Admission: 3/15/2025   Date of Discharge: 3/21/2025    Admitting Diagnosis: Acute cholecystitis [K81.0]  Acute pancreatitis, unspecified complication status, unspecified pancreatitis type (HCC) [K85.90]    Disposition: Home    Discharge Diagnosis: .Principal Problem:    Acute pancreatitis, unspecified complication status, unspecified pancreatitis type (HCC)  Active Problems:    Pancreatitis (HCC)    Acute cholecystitis      Hospital Course:   Reason for Admission: Abdominal pain    Discharge Physical Exam: General appearance: alert, appears stated age, and cooperative  Head: Normocephalic, without obvious abnormality, atraumatic  Pulmonary:  clear to auscultation bilaterally  Cardiovascular: S1, S2 normal, no murmur, click, rub or gallop, regular rate and rhythm, S1, S2 normal  Abdominal: soft, non-tender; bowel sounds normal; no masses,  no organomegaly  Extremities: extremities normal, atraumatic, no cyanosis or edema  Pulses: 2+ and symmetric  Skin: Skin color, texture, turgor  normal. No rashes or lesions    Hospital Course: Pt seen by GI started on feedings, tolerating well DC home    Complications: None    Consultants         Provider   Role Specialty     Navdeep Borrero MD      Consulting Physician SURGERY, GENERAL     Avani Schneider MD      Consulting Physician GASTROENTEROLOGY                Discharge Plan:   Discharge Condition: Stable    Current Discharge Medication List        New Orders    Details   HYDROcodone-acetaminophen 5-325 MG Oral Tab Take 1 tablet by mouth every 6 (six) hours as needed.           Home Meds - Unchanged    Details   simvastatin 40 MG Oral Tab Take 1 tablet (40 mg total) by mouth nightly.      Pantoprazole Sodium 40 MG Oral Tab EC Take 1 tablet (40 mg total) by mouth once daily.      lisinopril 10 MG Oral Tab Take 0.5 tablets (5 mg total) by mouth daily.      MetFORMIN HCl 500 MG Oral Tab 1/2 tab PO q12 hrs. Pre meals.      Glucose Blood (FREESTYLE LITE TEST) In Vitro Strip Dx. 250.02. take by Misc.(Non-Drug; Combo Route) route twice a day      FreeStyle Lancets Does not apply Misc as directed      Blood Glucose Monitoring Suppl (FREESTYLE LITE) Does not apply Device as directed      aspirin 81 MG Oral Tab Take 1 tablet by mouth daily.                 Discharge Diet: As tolerated    Discharge Activity: As tolerated    Follow up:     Time spent 35 min        HERMAN COOK, MD OMAYRA  3/21/2025      Electronically signed by Omayra Henao MD on 3/21/2025  8:52 AM         REVIEWER COMMENTS

## 2025-03-29 ENCOUNTER — APPOINTMENT (OUTPATIENT)
Dept: LAB | Age: 80
End: 2025-03-29

## 2025-04-12 ENCOUNTER — LAB REQUISITION (OUTPATIENT)
Dept: LAB | Age: 80
End: 2025-04-12

## 2025-04-12 ENCOUNTER — APPOINTMENT (OUTPATIENT)
Dept: LAB | Age: 80
End: 2025-04-12

## 2025-04-12 DIAGNOSIS — C61 MALIGNANT NEOPLASM OF PROSTATE  (CMD): ICD-10-CM

## 2025-04-12 DIAGNOSIS — K85.90 ACUTE PANCREATITIS WITHOUT NECROSIS OR INFECTION, UNSPECIFIED (CMD): ICD-10-CM

## 2025-04-12 DIAGNOSIS — E11.9 TYPE 2 DIABETES MELLITUS WITHOUT COMPLICATIONS (CMD): ICD-10-CM

## 2025-04-12 DIAGNOSIS — E78.5 HYPERLIPIDEMIA, UNSPECIFIED: ICD-10-CM

## 2025-04-12 DIAGNOSIS — I10 ESSENTIAL (PRIMARY) HYPERTENSION: ICD-10-CM

## 2025-04-14 DIAGNOSIS — F17.200 SMOKER: Primary | ICD-10-CM

## 2025-05-24 ENCOUNTER — LAB SERVICES (OUTPATIENT)
Dept: LAB | Age: 80
End: 2025-05-24

## 2025-05-24 LAB
ALBUMIN SERPL-MCNC: 3.6 G/DL (ref 3.4–5)
ALBUMIN/GLOB SERPL: 1.1 {RATIO} (ref 1–2.4)
ALP SERPL-CCNC: 128 UNITS/L (ref 45–117)
ALT SERPL-CCNC: 17 UNITS/L
AMYLASE SERPL-CCNC: 92 UNITS/L (ref 25–115)
ANION GAP SERPL CALC-SCNC: 10 MMOL/L (ref 7–19)
APPEARANCE UR: CLEAR
AST SERPL-CCNC: 15 UNITS/L
BILIRUB SERPL-MCNC: 0.6 MG/DL (ref 0.2–1)
BILIRUB UR QL STRIP: NEGATIVE
BUN SERPL-MCNC: 14 MG/DL (ref 6–20)
BUN/CREAT SERPL: 13 (ref 7–25)
CALCIUM SERPL-MCNC: 9.2 MG/DL (ref 8.4–10.2)
CHLORIDE SERPL-SCNC: 106 MMOL/L (ref 97–110)
CHOLEST SERPL-MCNC: 130 MG/DL
CHOLEST/HDLC SERPL: 3 {RATIO}
CO2 SERPL-SCNC: 27 MMOL/L (ref 21–32)
COLOR UR: ABNORMAL
CREAT SERPL-MCNC: 1.12 MG/DL (ref 0.67–1.17)
CREAT UR-MCNC: 73.5 MG/DL
DEPRECATED RDW RBC: 43.9 FL (ref 39–50)
EGFRCR SERPLBLD CKD-EPI 2021: 66 ML/MIN/{1.73_M2}
ERYTHROCYTE [DISTWIDTH] IN BLOOD: 13.8 % (ref 11–15)
FASTING DURATION TIME PATIENT: 12 HOURS (ref 0–999)
GLOBULIN SER-MCNC: 3.3 G/DL (ref 2–4)
GLUCOSE SERPL-MCNC: 101 MG/DL (ref 70–99)
GLUCOSE UR STRIP-MCNC: NEGATIVE MG/DL
HBA1C MFR BLD: 6.6 % (ref 4.5–5.6)
HCT VFR BLD CALC: 39.7 % (ref 39–51)
HDLC SERPL-MCNC: 43 MG/DL
HGB BLD-MCNC: 12.7 G/DL (ref 13–17)
HGB UR QL STRIP: NEGATIVE
KETONES UR STRIP-MCNC: NEGATIVE MG/DL
LDLC SERPL CALC-MCNC: 55 MG/DL
LEUKOCYTE ESTERASE UR QL STRIP: NEGATIVE
LIPASE SERPL-CCNC: 87 UNITS/L (ref 15–77)
MCH RBC QN AUTO: 27.7 PG (ref 26–34)
MCHC RBC AUTO-ENTMCNC: 32 G/DL (ref 32–36.5)
MCV RBC AUTO: 86.7 FL (ref 78–100)
MICROALBUMIN UR-MCNC: 0.63 MG/DL
MICROALBUMIN/CREAT UR: 8.6 MG/G
NITRITE UR QL STRIP: NEGATIVE
NONHDLC SERPL-MCNC: 87 MG/DL
NRBC BLD MANUAL-RTO: 0 /100 WBC
PH UR STRIP: 8 [PH] (ref 5–7)
PLATELET # BLD AUTO: 190 K/MCL (ref 140–450)
POTASSIUM SERPL-SCNC: 4.4 MMOL/L (ref 3.4–5.1)
PROT SERPL-MCNC: 6.9 G/DL (ref 6.4–8.2)
PROT UR STRIP-MCNC: NEGATIVE MG/DL
PSA SERPL-MCNC: 2.87 NG/ML
RBC # BLD: 4.58 MIL/MCL (ref 4.5–5.9)
SODIUM SERPL-SCNC: 139 MMOL/L (ref 135–145)
SP GR UR STRIP: 1.01 (ref 1–1.03)
TRIGL SERPL-MCNC: 161 MG/DL
UROBILINOGEN UR STRIP-MCNC: 0.2 MG/DL
WBC # BLD: 7.6 K/MCL (ref 4.2–11)

## 2025-05-24 PROCEDURE — 82150 ASSAY OF AMYLASE: CPT | Performed by: CLINICAL MEDICAL LABORATORY

## 2025-05-24 PROCEDURE — 82043 UR ALBUMIN QUANTITATIVE: CPT | Performed by: CLINICAL MEDICAL LABORATORY

## 2025-05-24 PROCEDURE — 84153 ASSAY OF PSA TOTAL: CPT | Performed by: CLINICAL MEDICAL LABORATORY

## 2025-05-24 PROCEDURE — 80053 COMPREHEN METABOLIC PANEL: CPT | Performed by: CLINICAL MEDICAL LABORATORY

## 2025-05-24 PROCEDURE — 83690 ASSAY OF LIPASE: CPT | Performed by: CLINICAL MEDICAL LABORATORY

## 2025-05-24 PROCEDURE — 85027 COMPLETE CBC AUTOMATED: CPT | Performed by: CLINICAL MEDICAL LABORATORY

## 2025-05-24 PROCEDURE — 80061 LIPID PANEL: CPT | Performed by: CLINICAL MEDICAL LABORATORY

## 2025-05-24 PROCEDURE — 82570 ASSAY OF URINE CREATININE: CPT | Performed by: CLINICAL MEDICAL LABORATORY

## 2025-05-24 PROCEDURE — 81003 URINALYSIS AUTO W/O SCOPE: CPT | Performed by: CLINICAL MEDICAL LABORATORY

## 2025-05-24 PROCEDURE — 83036 HEMOGLOBIN GLYCOSYLATED A1C: CPT | Performed by: CLINICAL MEDICAL LABORATORY

## 2025-06-06 ENCOUNTER — APPOINTMENT (OUTPATIENT)
Dept: CT IMAGING | Facility: HOSPITAL | Age: 80
End: 2025-06-06
Attending: EMERGENCY MEDICINE
Payer: MEDICARE

## 2025-06-06 ENCOUNTER — HOSPITAL ENCOUNTER (INPATIENT)
Facility: HOSPITAL | Age: 80
LOS: 3 days | Discharge: HOME HEALTH | End: 2025-06-09
Attending: EMERGENCY MEDICINE | Admitting: HOSPITALIST
Payer: MEDICARE

## 2025-06-06 ENCOUNTER — APPOINTMENT (OUTPATIENT)
Dept: GENERAL RADIOLOGY | Facility: HOSPITAL | Age: 80
End: 2025-06-06
Attending: EMERGENCY MEDICINE
Payer: MEDICARE

## 2025-06-06 ENCOUNTER — OFFICE VISIT (OUTPATIENT)
Dept: FAMILY MEDICINE CLINIC | Facility: CLINIC | Age: 80
End: 2025-06-06
Payer: COMMERCIAL

## 2025-06-06 VITALS
WEIGHT: 167 LBS | DIASTOLIC BLOOD PRESSURE: 64 MMHG | BODY MASS INDEX: 26.84 KG/M2 | RESPIRATION RATE: 18 BRPM | HEIGHT: 66 IN | HEART RATE: 70 BPM | OXYGEN SATURATION: 100 % | TEMPERATURE: 97 F | SYSTOLIC BLOOD PRESSURE: 112 MMHG

## 2025-06-06 DIAGNOSIS — E78.00 HYPERCHOLESTEROLEMIA: ICD-10-CM

## 2025-06-06 DIAGNOSIS — K85.91: Primary | ICD-10-CM

## 2025-06-06 DIAGNOSIS — R10.13 EPIGASTRIC PAIN: Primary | ICD-10-CM

## 2025-06-06 PROBLEM — R73.9 HYPERGLYCEMIA: Status: ACTIVE | Noted: 2025-06-06

## 2025-06-06 PROBLEM — E87.1 HYPONATREMIA: Status: ACTIVE | Noted: 2025-06-06

## 2025-06-06 PROBLEM — D69.6 THROMBOCYTOPENIA: Status: ACTIVE | Noted: 2025-06-06

## 2025-06-06 LAB
ALBUMIN SERPL-MCNC: 4.4 G/DL (ref 3.2–4.8)
ALBUMIN/GLOB SERPL: 1.6 {RATIO} (ref 1–2)
ALP LIVER SERPL-CCNC: 103 U/L (ref 45–117)
ALT SERPL-CCNC: 12 U/L (ref 10–49)
ANION GAP SERPL CALC-SCNC: 8 MMOL/L (ref 0–18)
AST SERPL-CCNC: 19 U/L (ref ?–34)
ATRIAL RATE: 60 BPM
BASOPHILS # BLD AUTO: 0.03 X10(3) UL (ref 0–0.2)
BASOPHILS NFR BLD AUTO: 0.3 %
BILIRUB SERPL-MCNC: 0.8 MG/DL (ref 0.2–1.1)
BILIRUB UR QL: NEGATIVE
BUN BLD-MCNC: 21 MG/DL (ref 9–23)
BUN/CREAT SERPL: 14.7 (ref 10–20)
CALCIUM BLD-MCNC: 9.9 MG/DL (ref 8.7–10.4)
CHLORIDE SERPL-SCNC: 100 MMOL/L (ref 98–112)
CLARITY UR: CLEAR
CO2 SERPL-SCNC: 27 MMOL/L (ref 21–32)
COLOR UR: YELLOW
CREAT BLD-MCNC: 1.43 MG/DL (ref 0.7–1.3)
DEPRECATED RDW RBC AUTO: 41.3 FL (ref 35.1–46.3)
EGFRCR SERPLBLD CKD-EPI 2021: 50 ML/MIN/1.73M2 (ref 60–?)
EOSINOPHIL # BLD AUTO: 0.01 X10(3) UL (ref 0–0.7)
EOSINOPHIL NFR BLD AUTO: 0.1 %
ERYTHROCYTE [DISTWIDTH] IN BLOOD BY AUTOMATED COUNT: 13.2 % (ref 11–15)
GLOBULIN PLAS-MCNC: 2.7 G/DL (ref 2–3.5)
GLUCOSE BLD-MCNC: 170 MG/DL (ref 70–99)
GLUCOSE BLDC GLUCOMTR-MCNC: 109 MG/DL (ref 70–99)
GLUCOSE BLDC GLUCOMTR-MCNC: 129 MG/DL (ref 70–99)
GLUCOSE BLDC GLUCOMTR-MCNC: 186 MG/DL (ref 70–99)
GLUCOSE UR-MCNC: NORMAL MG/DL
HCT VFR BLD AUTO: 35.5 % (ref 39–53)
HGB BLD-MCNC: 11.6 G/DL (ref 13–17.5)
HYALINE CASTS #/AREA URNS AUTO: PRESENT /LPF
IMM GRANULOCYTES # BLD AUTO: 0.11 X10(3) UL (ref 0–1)
IMM GRANULOCYTES NFR BLD: 1.2 %
KETONES UR-MCNC: NEGATIVE MG/DL
LEUKOCYTE ESTERASE UR QL STRIP.AUTO: NEGATIVE
LIPASE SERPL-CCNC: 69 U/L (ref 12–53)
LYMPHOCYTES # BLD AUTO: 0.81 X10(3) UL (ref 1–4)
LYMPHOCYTES NFR BLD AUTO: 8.5 %
MCH RBC QN AUTO: 28 PG (ref 26–34)
MCHC RBC AUTO-ENTMCNC: 32.7 G/DL (ref 31–37)
MCV RBC AUTO: 85.5 FL (ref 80–100)
MONOCYTES # BLD AUTO: 0.98 X10(3) UL (ref 0.1–1)
MONOCYTES NFR BLD AUTO: 10.3 %
NEUTROPHILS # BLD AUTO: 7.58 X10 (3) UL (ref 1.5–7.7)
NEUTROPHILS # BLD AUTO: 7.58 X10(3) UL (ref 1.5–7.7)
NEUTROPHILS NFR BLD AUTO: 79.6 %
NITRITE UR QL STRIP.AUTO: NEGATIVE
NT-PROBNP SERPL-MCNC: 2990 PG/ML (ref ?–450)
OSMOLALITY SERPL CALC.SUM OF ELEC: 287 MOSM/KG (ref 275–295)
P AXIS: 34 DEGREES
P-R INTERVAL: 272 MS
PH UR: 5.5 [PH] (ref 5–8)
PLATELET # BLD AUTO: 131 10(3)UL (ref 150–450)
POTASSIUM SERPL-SCNC: 5 MMOL/L (ref 3.5–5.1)
PROT SERPL-MCNC: 7.1 G/DL (ref 5.7–8.2)
Q-T INTERVAL: 414 MS
QRS DURATION: 86 MS
QTC CALCULATION (BEZET): 414 MS
R AXIS: -33 DEGREES
RBC # BLD AUTO: 4.15 X10(6)UL (ref 3.8–5.8)
RBC #/AREA URNS AUTO: >10 /HPF
SODIUM SERPL-SCNC: 135 MMOL/L (ref 136–145)
SP GR UR STRIP: 1.02 (ref 1–1.03)
T AXIS: 11 DEGREES
TROPONIN I SERPL HS-MCNC: 4 NG/L (ref ?–53)
UROBILINOGEN UR STRIP-ACNC: NORMAL
VENTRICULAR RATE: 60 BPM
WBC # BLD AUTO: 9.5 X10(3) UL (ref 4–11)

## 2025-06-06 PROCEDURE — 71045 X-RAY EXAM CHEST 1 VIEW: CPT | Performed by: EMERGENCY MEDICINE

## 2025-06-06 PROCEDURE — 99223 1ST HOSP IP/OBS HIGH 75: CPT | Performed by: HOSPITALIST

## 2025-06-06 PROCEDURE — 74177 CT ABD & PELVIS W/CONTRAST: CPT | Performed by: EMERGENCY MEDICINE

## 2025-06-06 PROCEDURE — 99215 OFFICE O/P EST HI 40 MIN: CPT | Performed by: PHYSICIAN ASSISTANT

## 2025-06-06 RX ORDER — DEXTROSE MONOHYDRATE 25 G/50ML
50 INJECTION, SOLUTION INTRAVENOUS
Status: DISCONTINUED | OUTPATIENT
Start: 2025-06-06 | End: 2025-06-09

## 2025-06-06 RX ORDER — MORPHINE SULFATE 2 MG/ML
2 INJECTION, SOLUTION INTRAMUSCULAR; INTRAVENOUS EVERY 2 HOUR PRN
Status: DISCONTINUED | OUTPATIENT
Start: 2025-06-06 | End: 2025-06-09

## 2025-06-06 RX ORDER — ONDANSETRON 2 MG/ML
4 INJECTION INTRAMUSCULAR; INTRAVENOUS EVERY 6 HOURS PRN
Status: DISCONTINUED | OUTPATIENT
Start: 2025-06-06 | End: 2025-06-09

## 2025-06-06 RX ORDER — SODIUM CHLORIDE 9 MG/ML
1000 INJECTION, SOLUTION INTRAVENOUS ONCE
Status: COMPLETED | OUTPATIENT
Start: 2025-06-06 | End: 2025-06-06

## 2025-06-06 RX ORDER — MORPHINE SULFATE 2 MG/ML
1 INJECTION, SOLUTION INTRAMUSCULAR; INTRAVENOUS EVERY 2 HOUR PRN
Status: DISCONTINUED | OUTPATIENT
Start: 2025-06-06 | End: 2025-06-09

## 2025-06-06 RX ORDER — NICOTINE POLACRILEX 4 MG
30 LOZENGE BUCCAL
Status: DISCONTINUED | OUTPATIENT
Start: 2025-06-06 | End: 2025-06-09

## 2025-06-06 RX ORDER — NICOTINE POLACRILEX 4 MG
15 LOZENGE BUCCAL
Status: DISCONTINUED | OUTPATIENT
Start: 2025-06-06 | End: 2025-06-09

## 2025-06-06 RX ORDER — ACETAMINOPHEN 500 MG
500 TABLET ORAL EVERY 4 HOURS PRN
Status: DISCONTINUED | OUTPATIENT
Start: 2025-06-06 | End: 2025-06-09

## 2025-06-06 RX ORDER — ONDANSETRON 2 MG/ML
4 INJECTION INTRAMUSCULAR; INTRAVENOUS ONCE
Status: COMPLETED | OUTPATIENT
Start: 2025-06-06 | End: 2025-06-06

## 2025-06-06 RX ORDER — METOCLOPRAMIDE HYDROCHLORIDE 5 MG/ML
5 INJECTION INTRAMUSCULAR; INTRAVENOUS EVERY 8 HOURS PRN
Status: DISCONTINUED | OUTPATIENT
Start: 2025-06-06 | End: 2025-06-09

## 2025-06-06 RX ORDER — MORPHINE SULFATE 2 MG/ML
2 INJECTION, SOLUTION INTRAMUSCULAR; INTRAVENOUS ONCE
Status: COMPLETED | OUTPATIENT
Start: 2025-06-06 | End: 2025-06-06

## 2025-06-06 RX ORDER — SODIUM CHLORIDE, SODIUM LACTATE, POTASSIUM CHLORIDE, CALCIUM CHLORIDE 600; 310; 30; 20 MG/100ML; MG/100ML; MG/100ML; MG/100ML
INJECTION, SOLUTION INTRAVENOUS CONTINUOUS
Status: DISCONTINUED | OUTPATIENT
Start: 2025-06-06 | End: 2025-06-07

## 2025-06-06 RX ORDER — MORPHINE SULFATE 4 MG/ML
4 INJECTION, SOLUTION INTRAMUSCULAR; INTRAVENOUS EVERY 2 HOUR PRN
Status: DISCONTINUED | OUTPATIENT
Start: 2025-06-06 | End: 2025-06-09

## 2025-06-06 NOTE — SPIRITUAL CARE NOTE
Spiritual Care Visit Note    Patient Name: Francisco Del Rio Date of Spiritual Care Visit: 25   : 10/24/1945 Primary Dx: Acute necrotizing pancreatitis (HCC)       Referred By: Referral From: Nurse, Family, Patient, Advanced Directives    Spiritual Care Taxonomy:    Intended Effects: Aligning care plan with patient's values    Methods: Demonstrate acceptance    Interventions: Acknowledge current situation    Visit Type/Summary:     received call from RN indicated patient and family wanted to complete Advanced Directives, POAHC, none on EMR.   Patient is decsional per RN, family at bedside. Writer attempted to visit, patient busy with staff, no family present at this time.      - Spiritual Care: Responded to a request via the on call phone Consulted with RN prior to visit. Offered empathic listening and emotional support.  - PoA: Other: Patient did not wish to receive advance directive information at this time. Patient busy with staff.   remains available for follow up.    Spiritual Care support can be requested via an Epic consult.   For urgent/immediate needs, please contact the On Call  at: [place]: ext [phone]    -Chaplain Barbara.

## 2025-06-06 NOTE — ED PROVIDER NOTES
Patient Seen in: [facility] Emergency Department        History  Chief Complaint   Patient presents with    Epigastric Pain     Stated Complaint: Sent from [facility]: epigastric pain. Declined ambulance    Subjective:   HPI            Patient presents the emergency department with 5 days of epigastric discomfort.  Patient has nausea with 1 episode of vomiting this morning.  Went to a walk-in clinic and was redirected here.  He has a previous history of pancreatitis several months ago requiring hospitalization.      Objective:     Past Medical History:    Diabetes (HCC)    Esophageal reflux    Gastritis    High blood pressure    High cholesterol    Other and unspecified hyperlipidemia              History reviewed. No pertinent surgical history.             Social History     Socioeconomic History    Marital status:    Tobacco Use    Smoking status: Some Days     Current packs/day: 0.50     Average packs/day: 0.5 packs/day for 50.0 years (25.0 ttl pk-yrs)     Types: Cigarettes    Smokeless tobacco: Never   Vaping Use    Vaping status: Never Used   Substance and Sexual Activity    Alcohol use: Not Currently     Alcohol/week: 8.3 standard drinks of alcohol     Types: 10 Cans of beer per week    Drug use: No   Other Topics Concern    Caffeine Concern Yes     Comment: Coffee, soda      Social Drivers of Health     Food Insecurity: No Food Insecurity (3/16/2025)    NCSS - Food Insecurity     Worried About Running Out of Food in the Last Year: No     Ran Out of Food in the Last Year: No   Transportation Needs: No Transportation Needs (3/16/2025)    NCSS - Transportation     Lack of Transportation: No   Housing Stability: Not At Risk (3/16/2025)    NCSS - Housing/Utilities     Has Housing: Yes     Worried About Losing Housing: No     Unable to Get Utilities: No                                Physical Exam    ED Triage Vitals [06/06/25 0856]   /63   Pulse 70   Resp 18   Temp 98 °F (36.7 °C)   Temp src    SpO2  98 %   O2 Device        Current Vitals:   Vital Signs  BP: 130/58  Pulse: 58  Resp: 21  Temp: 98 °F (36.7 °C)  MAP (mmHg): 80    Oxygen Therapy  SpO2: 95 %            Physical Exam  Vitals and nursing note reviewed.   Constitutional:       General: He is not in acute distress.     Appearance: He is well-developed.   HENT:      Head: Normocephalic.      Nose: Nose normal.      Mouth/Throat:      Mouth: Mucous membranes are moist.   Eyes:      Conjunctiva/sclera: Conjunctivae normal.   Cardiovascular:      Rate and Rhythm: Normal rate and regular rhythm.      Heart sounds: No murmur heard.  Pulmonary:      Effort: Pulmonary effort is normal. No respiratory distress.      Breath sounds: Normal breath sounds.   Abdominal:      General: There is no distension.      Palpations: Abdomen is soft.      Tenderness: There is abdominal tenderness in the epigastric area. There is no guarding or rebound.   Musculoskeletal:         General: No tenderness. Normal range of motion.      Cervical back: Normal range of motion and neck supple.   Skin:     General: Skin is warm and dry.      Findings: No rash.   Neurological:      Mental Status: He is alert and oriented to person, place, and time.                 ED Course  Labs Reviewed   COMP METABOLIC PANEL (14) - Abnormal; Notable for the following components:       Result Value    Glucose 170 (*)     Sodium 135 (*)     Creatinine 1.43 (*)     eGFR-Cr 50 (*)     All other components within normal limits   CBC WITH DIFFERENTIAL WITH PLATELET - Abnormal; Notable for the following components:    HGB 11.6 (*)     HCT 35.5 (*)     .0 (*)     Lymphocyte Absolute 0.81 (*)     All other components within normal limits   LIPASE - Abnormal; Notable for the following components:    Lipase 69 (*)     All other components within normal limits   URINALYSIS WITH CULTURE REFLEX - Abnormal; Notable for the following components:    Blood Urine 2+ (*)     Protein Urine Trace (*)     RBC Urine  >10 (*)     Hyaline Casts Present (*)     All other components within normal limits   PRO BETA NATRIURETIC PEPTIDE - Abnormal; Notable for the following components:    Pro-Beta Natriuretic Peptide 2,990 (*)     All other components within normal limits   TROPONIN I HIGH SENSITIVITY - Normal   RAINBOW DRAW BLUE     EKG    Rate, intervals and axes as noted on EKG Report.  Rate: 60 bpm  Rhythm: Sinus Rhythm  Reading: LVH with strain, abnormal but no acute injury pattern.                                MDM         Admission disposition: 6/6/2025 11:37 AM           Medical Decision Making  Differential diagnosis considered for gastritis, reflux, pancreatitis, cholelithiasis with biliary colic.    Problems Addressed:  Acute necrotizing pancreatitis (HCC): acute illness or injury    Amount and/or Complexity of Data Reviewed  Labs: ordered. Decision-making details documented in ED Course.     Details: Elevated lipase.  CBC chemistry panel unremarkable.  Radiology: ordered and independent interpretation performed. Decision-making details documented in ED Course.     Details: CT scan of the abdomen shows necrotizing pancreatitis with fluid collection.  ECG/medicine tests: ordered and independent interpretation performed. Decision-making details documented in ED Course.  Discussion of management or test interpretation with external provider(s): Case discussed with the hospitalist who evaluated the patient in the emergency department as well as gastroenterology, Dr. Cox.  Will admit for further evaluation and treatment.  Gentle IV hydration.    Risk  Prescription drug management.  Parenteral controlled substances.  Decision regarding hospitalization.        Disposition and Plan     Clinical Impression:  1. Acute necrotizing pancreatitis (HCC)         Disposition:  Admit  6/6/2025 11:37 am    Follow-up:  No follow-up provider specified.  We recommend that you schedule follow up care with a primary care provider within the next  three months to obtain basic health screening including reassessment of your blood pressure.      Medications Prescribed:  Current Discharge Medication List                Supplementary Documentation:         Hospital Problems       Present on Admission  Date Reviewed: 6/6/2025          ICD-10-CM Noted POA    * (Principal) Acute necrotizing pancreatitis (HCC) K85.91 6/6/2025 Unknown    Hyperglycemia R73.9 6/6/2025 Yes    Hyponatremia E87.1 6/6/2025 Yes    Thrombocytopenia (HCC) D69.6 6/6/2025 Yes

## 2025-06-06 NOTE — CONSULTS
[facility]  part of [facility]    Report of Consultation    Francisco Del Rio Patient Status:  Inpatient    10/24/1945 MRN [id number]   Location [facility] 5SW/SE Attending Albert Fleming MD   Hosp Day # 0 PCP IRIS BOLAÑOS MD     Date of Admission:  2025  Date of Consult:  2025  Reason for Consultation:   Necrotizing pancreatitis    History of Present Illness:     79 year old male with a history of diabetes and hypertension, recently admitted for acute pancreatitis who is readmitted now with necrotizing pancreatitis.  Patient was admitted here 3 months ago with acute interstitial pancreatitis.  He had no drugs to implicate, normal LFTs with no stones on ultrasound, negative hepatobiliary scan, only minimally elevated triglycerides.  He and his family report that he was a heavy alcohol drinker on weekends for at least 40 years but quit 10 years ago.  He continues to smoke which she is density was 14 years old.  After discharge from the hospital his abdominal pain had subsided.  He advance from a bland diet to a more regular diet.  He was seen as an outpatient by Dr. Schneider with plans for a follow-up MRI of the abdomen after his inflammation subsided.  5 days ago he had a recurrence of heartburn and epigastric pain which he felt was gastritis.  He did not tell his family about this initially.  The pain was relatively constant and increased in intensity during the course of the week prompting ER visit that I where CT scan showed evidence of necrotizing pancreatitis with partial obstruction of his splanchnic venous system.  Currently he is only mild pain and is hungry.    Past Medical History  Past Medical History[1]    Past Surgical History  Past Surgical History[2]    Family History  Family History[3]    Social History  Pediatric History   Patient Parents    Not on file     Other Topics Concern     Service Not Asked    Blood Transfusions Not Asked    Caffeine Concern Yes      Comment: Coffee, soda     Occupational Exposure Not Asked    Hobby Hazards Not Asked    Sleep Concern Not Asked    Stress Concern Not Asked    Weight Concern Not Asked    Special Diet Not Asked    Back Care Not Asked    Exercise Not Asked    Bike Helmet Not Asked    Seat Belt Not Asked    Self-Exams Not Asked   Social History Narrative    Not on file           Current Medications:  Current Hospital Medications[4]  Prescriptions Prior to Admission[5]    Allergies  Allergies[6]    Review of Systems:   GENERAL HEALTH: feels well otherwise, denies fever or weight loss  SKIN: denies any unusual skin lesions or rashes  EYES: no visual complaints or deficits  HEENT: denies mouth sores  RESPIRATORY: no shortness of breath   CARDIOVASCULAR:no chest pain   GI: Refer to HPI,   : no hematuria  MUSCULOSKELETAL: no joint swelling or warmth  NEURO: no focal weakness or numbness  PSYCHE: no depression or anxiety  HEMATOLOGIC: no easy bruising  ENDOCRINE: no temperature intolerance    Physical Exam:   Blood pressure 159/57, pulse 68, temperature 99.8 °F (37.7 °C), temperature source Oral, resp. rate 20, height 5' 3\" (1.6 m), weight 165 lb 11.2 oz (75.2 kg), SpO2 96%.  GENERAL: well developed, in no apparent distress  SKIN: no rashes,no suspicious lesions  HEENT: atraumatic, normocephalic, oropharynx clear  NECK: supple,no adenopathy, no masses  LUNGS: clear to auscultation  CARDIO: RRR   GI: normal active BS, mild epigastric tenderness on deep palpation, no masses or ascites, normal liver span/no organomegaly  EXTREMITIES: no calf tenderness  MUSCULOSKELETAL: no calf tenderness  PSYCH: normal affect  NUT: well nourished appearing, no cachexia      Results:     Laboratory Data:  Lab Results   Component Value Date    WBC 9.5 06/06/2025    HGB 11.6 (L) 06/06/2025    HCT 35.5 (L) 06/06/2025    .0 (L) 06/06/2025    CREATSERUM 1.43 (H) 06/06/2025    BUN 21 06/06/2025     (L) 06/06/2025    K 5.0 06/06/2025      06/06/2025    CO2 27.0 06/06/2025     (H) 06/06/2025    CA 9.9 06/06/2025    ALB 4.4 06/06/2025    ALKPHO 103 06/06/2025    TP 7.1 06/06/2025    AST 19 06/06/2025    ALT 12 06/06/2025    PTT 22.8 (L) 03/16/2025    INR 0.96 03/16/2025    PTP 13.4 03/16/2025    KILLIAN 94 03/20/2025    LIP 69 (H) 06/06/2025         Imaging:  CT ABDOMEN+PELVIS(CONTRAST ONLY)(CPT=74177)  Result Date: 6/6/2025  PROCEDURE: CT ABDOMEN + PELVIS (CONTRAST ONLY) (CPT=74177)  COMPARISON: [facility], MRI ABDOMEN AND MRCP W/3D (FYM=32168/61391), 3/16/2025, 7:45 AM.  [facility], CT ABDOMEN + PELVIS (CONTRAST ONLY) (CPT=74177), 3/16/2025, 0:05 AM.  INDICATIONS: Sent from [facility]: epigastric pain. Declined ambulance  TECHNIQUE: Multidetector CT images of the abdomen and pelvis were obtained with non-ionic intravenous contrast material. Automated exposure control for dose reduction was used. Adjustment of the mA and/or kV was done based on the patient's size. Iterative reconstruction technique for dose reduction was employed.  FINDINGS: LUNG BASES: The heart is normal in size; partially imaged coronary artery calcification. There is dependent subsegmental atelectasis bilaterally. LIVER: No enlargement, atrophy, abnormal density, or significant focal lesion is identified.  BILIARY: The gallbladder is present. PANCREAS: Interval development of a large multilobulated thick walled peripherally enhancing collection at the pancreatic neck/body that spans approximately 8.6 x 6.5 cm and demonstrates intrinsic foci of gas.  Smaller peripherally enhancing collection with intrinsic gas at the uncinate process of the pancreas spans 4.2 x 2.2 cm and is also new since prior.  There is distal pancreatic atrophy.  Narrowing and probable partial occlusion of the splenic vein as well as superior mesenteric vein and base of the main portal vein adjacent to the aforementioned peripancreatic collections.  No gross pancreatic ductal  dilation. SPLEEN: Mildly enlarged 13.7 cm maximal craniocaudal extent spleen. ADRENALS:   No defined mass or abnormal enlargement.  KIDNEYS:   Symmetric enhancement is seen without evidence of hydronephrosis or underlying solid masses.  Simple-appearing left upper pole renal cyst with probable right renal hilar vascular calcification. GI/MESENTERY:  There is no evidence of bowel obstruction.  Mild gastric distention noted; mild stranding in the upper small bowel mesentery may be reactive to aforementioned peripancreatic collections.  Colonic diverticulosis, please note that some segments of the colon are incompletely distended and suboptimally evaluated.  Normal appendix.  URINARY BLADDER: Incompletely distended and suboptimally evaluated. PELVIC NODES: No lymphadenopathy.   PELVIC ORGANS: Enlarged 6.5 cm diameter prostate gland, which indents the bladder base. VASCULATURE:   Stable fusiform 3.2 cm infrarenal abdominal aortic aneurysm with moderate atherosclerosis. RETROPERITONEUM: No mass or lymphadenopathy is apparent.  BONES:   Demineralization with lower lumbar spine degeneration. ABDOMINAL WALL: Small fat containing umbilical hernia, which demonstrates mild intrinsic stranding. OTHER: Trace free fluid in the pelvis.  No free intraperitoneal air.         CONCLUSION:  1. Interval development of a large multilobulated peripherally enhancing thick walled 8.6 x 6.5 cm collection at the pancreatic neck/body with intrinsic foci of gas since comparison abdominal CT March, 2025. There is a smaller thick walled collection of fluid and gas in the uncinate process that spans 4.2 x 2.2 cm, which is also new since prior.  Findings are concerning for multifocal walled-off pancreatic necrosis secondary to necrotizing pancreatitis.  There is associated narrowing and probable partial occlusion of the splenic vein, upper superior mesenteric vein, and base of the main portal vein; this finding is new since comparison exam.   Gastroenterology evaluation is recommended. 2. Mild peripancreatic and upper mesenteric inflammatory stranding, which is likely reactive to aforementioned subacute necrotizing pancreatitis.  Trace free fluid in the pelvis is also likely reactive. 3. Mild splenomegaly. 4. Stable 3.2 cm fusiform infrarenal abdominal aortic aneurysm.  Coronary and peripheral atherosclerosis. 5. Colonic diverticulosis. 6. Prostatomegaly. Circumferential urinary bladder wall thickening, which may relate to incomplete distention, chronic partial outlet obstruction from prostate gland enlargement, or cystitis.  If there are referable symptoms, urinalysis correlation is requested. 7. Lesser incidental findings as above.     [facility]  Dictated by (CST): Alexi Cardenas MD on 6/06/2025 at 11:15 AM     Finalized by (CST): Alexi Cardenas MD on 6/06/2025 at 11:27 AM          XR CHEST AP PORTABLE  (CPT=71045)  Result Date: 6/6/2025  PROCEDURE: XR CHEST AP PORTABLE  (CPT=71045) TIME: 931.   COMPARISON: [facility], XR CHEST AP PORTABLE (CPT=71045), 8/25/2023, 3:19 PM.  INDICATIONS: Epigastic pain, nausea, and vomitting.  TECHNIQUE:   Single view.   FINDINGS:  The heart mediastinal structures are minimally enlarged.  Pulmonary vascularity is upper limits of normal.  Lung volumes are normal with no focal consolidation, effusion, or pneumothorax.  There are moderate degenerative changes within both shoulders.         CONCLUSION: Slight cardiac enlargement with minimal increased prominence of pulmonary vascularity.  However no acute abnormalities identified.    Dictated by (CST): Teofilo Motley MD on 6/06/2025 at 9:49 AM     Finalized by (CST): Teofilo Motley MD on 6/06/2025 at 9:53 AM             Impression:   Necrotizing pancreatitis in the setting of heavy past alcohol use for 40 years (abstinent for 10 years) and chronic tobacco use.    Recommendations:  IV hydration  Oral feeding is advised within the first 24 hours of  admission with necrotizing pancreatitis to decrease the risk of loss of gut integrity, bacterial translocation and infected necrosis.  The patient is hungry with a benign examination.  Will advance to low-fat diet.  The importance of tobacco cessation was again stressed with the patient and his children at the bedside.  I explained in detail the significant mortality in this condition and that he may have a prolonged course.  Serial cross-sectional abdominal imaging will be necessary to assess progression of his walled off necrosis.    Time spent in direct patient contact and decision making as well as chart review, counseling/coordination of care:  80 minutes  Thank you for allowing me to participate in the care of your patient.  Note: This chart was prepared using voice recognition software and may contain unintended word substitution errors.   Carlo Cox MD FACG    6/6/2025       [1]   Past Medical History:   Diabetes (HCC)    Esophageal reflux    Gastritis    High blood pressure    High cholesterol    Other and unspecified hyperlipidemia   [2] History reviewed. No pertinent surgical history.  [3]   Family History  Problem Relation Age of Onset    Lipids Mother    [4]   Current Facility-Administered Medications   Medication Dose Route Frequency    lactated ringers infusion   Intravenous Continuous    acetaminophen (Tylenol Extra Strength) tab 500 mg  500 mg Oral Q4H PRN    morphINE PF 2 MG/ML injection 1 mg  1 mg Intravenous Q2H PRN    Or    morphINE PF 2 MG/ML injection 2 mg  2 mg Intravenous Q2H PRN    Or    morphINE PF 4 MG/ML injection 4 mg  4 mg Intravenous Q2H PRN    ondansetron (Zofran) 4 MG/2ML injection 4 mg  4 mg Intravenous Q6H PRN    metoclopramide (Reglan) 5 mg/mL injection 5 mg  5 mg Intravenous Q8H PRN    glucose (Dex4) 15 GM/59ML oral liquid 15 g  15 g Oral Q15 Min PRN    Or    glucose (Glutose) 40% oral gel 15 g  15 g Oral Q15 Min PRN    Or    glucose-vitamin C (Dex-4) chewable tab 4  tablet  4 tablet Oral Q15 Min PRN    Or    dextrose 50% injection 50 mL  50 mL Intravenous Q15 Min PRN    Or    glucose (Dex4) 15 GM/59ML oral liquid 30 g  30 g Oral Q15 Min PRN    Or    glucose (Glutose) 40% oral gel 30 g  30 g Oral Q15 Min PRN    Or    glucose-vitamin C (Dex-4) chewable tab 8 tablet  8 tablet Oral Q15 Min PRN    insulin aspart (NovoLOG) 100 Units/mL FlexPen 1-5 Units  1-5 Units Subcutaneous TID CC   [5]   Medications Prior to Admission   Medication Sig    simvastatin 40 MG Oral Tab Take 1 tablet (40 mg total) by mouth nightly.    Pantoprazole Sodium 40 MG Oral Tab EC Take 1 tablet (40 mg total) by mouth once daily.    lisinopril 10 MG Oral Tab Take 0.5 tablets (5 mg total) by mouth daily.    MetFORMIN HCl 500 MG Oral Tab 1/2 tab PO q12 hrs. Pre meals. (Patient taking differently: Take 1 tablet (500 mg total) by mouth in the morning and 1 tablet (500 mg total) in the evening. Take with meals.)    aspirin 81 MG Oral Tab Take 1 tablet by mouth daily.    Glucose Blood (FREESTYLE LITE TEST) In Vitro Strip Dx. 250.02. take by Misc.(Non-Drug; Combo Route) route twice a day    FreeStyle Lancets Does not apply Misc as directed    Blood Glucose Monitoring Suppl (FREESTYLE LITE) Does not apply Device as directed   [6] No Known Allergies

## 2025-06-06 NOTE — H&P
[facility]    PATIENT'S NAME: SIMBA SIDDIQI SR   ATTENDING PHYSICIAN: Carlo Casiano MD   PATIENT ACCOUNT#:   [id number]    LOCATION:  [address]  MEDICAL RECORD #:   [id number]       YOB: 1945  ADMISSION DATE:       06/06/2025    HISTORY AND PHYSICAL EXAMINATION    CHIEF COMPLAINT:  Acute necrotizing pancreatitis.     HISTORY OF PRESENT ILLNESS:  Patient is an 79-year-old  male who was hospitalized in March 2025 with a similar but less intense incidence of acute pancreatitis.  At that time, no etiology could be found.  He had a negative HIDA scan, normal liver function tests, and his pancreatitis was considered idiopathic.  He followed up with Gastroenterology as an outpatient, and he was supposed to have an MRCP with and without contrast and the plan was to wait until his pancreatitis resolved completely and the inflammation was gone.  He told his family that he has been having midepigastric abdominal pain associated with nausea and poor appetite for the last 5 days.  He informed his family about his symptoms today.  They brought him into the emergency department for evaluation.  CBC showed hemoglobin of 11.6, no leukocytosis or left shift.  Hemoglobin has been 11.6 which has dropped from 12.4 from March 2025.  Chemistry showed GFR of 50 which is slightly below his baseline.  ProBNP 2900 but patient appeared clinically dehydrated.  Urinalysis showed no evidence of urinary tract infection.  Lipase minimally elevated at 69, glucose 170, and sodium 135.  Chest x-ray showed no acute findings.  CT scan of the abdomen showed interval development of large, multilobulated, peripherally enhancing, thick-walled, 8.6 x 6.5 cm collection at the pancreatic neck and body with intrinsic foci of gas in comparison of March 2025.  There is a smaller thick-walled collection of fluid and gas in the uncinate process that spans 4.2 x 2.2 cm which is also new.  Findings concerning for  multifocal walled-off pancreatic necrosis secondary to necrotizing pancreatitis.  There is associated narrowing and probable partial occlusion of the splenic vein, upper superior mesenteric vein, and base of the main portal vein, findings are also new.  Mild peripancreatic and upper mesenteric inflammatory stranding which is likely reactive to the aforementioned subacute necrotizing pancreatitis.  Trace free fluid in the pelvis, also likely reactive.  No other acute findings on CT scan.  Patient will be admitted to the hospital for further management.    PAST MEDICAL HISTORY:  Recent idiopathic pancreatitis as outlined above in March 2025.  Diabetes mellitus type 2, noninsulin dependent.  Hypertension.  Hyperlipidemia.  Chronic kidney disease stage 2.  Gastritis.  Gastroesophageal reflux disease.  Evident on imaging study prostatomegaly.     PAST SURGICAL HISTORY:  None.    MEDICATIONS:  Please see medication reconciliation list.      ALLERGIES:  No known drug allergies.    FAMILY HISTORY:  Positive for diabetes mellitus type 2 and hypertension.    SOCIAL HISTORY:  No current tobacco, alcohol, or drug use.  Ex-tobacco and alcohol user, quit alcohol over 10 years ago.       REVIEW OF SYSTEMS:  Patient describes midepigastric abdominal pain radiating to bilateral upper quadrants, intense in nature, associated with nausea, poor appetite, food will increase the pain level.  He vomited once today.  No fever or chills.  No recent trauma or new medications.  Other 12-point review of systems is negative.        PHYSICAL EXAMINATION:    GENERAL:  Alert, oriented to time, place, and person.  Moderate distress.  Appears fatigued and cachectic.   VITAL SIGNS:  Temperature 98.0, pulse 58, respiratory rate 21, blood pressure 130/58, pulse ox 95% on room air.    HEENT:  Atraumatic.  Oropharynx clear.  Dry mucous membranes.  Ears, nose normal.  Eyes:  Anicteric sclerae.    NECK:  Supple.  No lymphadenopathy.  Trachea midline.    LUNGS:  Clear to auscultation bilaterally.  Normal respiratory effort.   HEART:  Regular rate and rhythm.  S1, S2 auscultated.  No murmur.    ABDOMEN:  Soft, nondistended.  Tenderness noted in epigastric and bilateral upper quadrant areas.  No guarding or rebound tenderness.  Hypoactive bowel sounds.    EXTREMITIES:  No peripheral edema, clubbing, or cyanosis.    NEUROLOGIC:  Motor and sensory intact.    ASSESSMENT:    1.   Acute necrotizing pancreatitis with 2 walled-off thick fluid collections at the neck and body and uncinate process as outlined above.  2.   Partially occluded most likely secondary to surrounding inflammatory changes splenic, superior mesenteric, and base of portal vein.  3.   Diabetes mellitus type 2.  4.   Dehydration with mild acute kidney injury.  5.   Essential hypertension.  Current blood pressure is relatively low.   6.   Abdominal pain secondary to above.    PLAN:  Patient will be admitted to general medical floor.  IV fluid, LR at 150 mL/h.  Pain control, n.p.o.  Monitor hemodynamic status.  Obtain gastroenterology consult.  Triglyceride level was obtained last admission which was normal.  Monitor Accu-Cheks, and sliding scale insulin.  Further recommendations to follow.    Dictated By Albert Fleming MD  d: 06/06/2025 12:24:59  t: 06/06/2025 12:56:43  Job [id number]  FB/

## 2025-06-06 NOTE — ED QUICK NOTES
Orders for admission, patient is aware of plan and ready to go upstairs. Any questions, please call ED RN lionel at extension [phone].     Patient Covid vaccination status: Fully vaccinated     COVID Test Ordered in ED: None    COVID Suspicion at Admission: N/A    Running Infusions: Medication Infusions[1] None    Mental Status/LOC at time of transport: a/o x4    Other pertinent information:   CIWA score: N/A   NIH score:  N/A             [1]

## 2025-06-06 NOTE — PROGRESS NOTES
CHIEF COMPLAINT:     Chief Complaint   Patient presents with    Abdominal Pain     Sx Sunday - GERD, epigastric pain, abdominal bloating  Denies n/v/d, loss of appetite, SOB, chest pain, fever, chills, body aches, recent travel  OTC Mylanta and omeprazole         HPI:   Francisco Del Rio is a 79 year old male who presents for complaints of upper abdominal pain for 5 days. Pain is in the epigastric area and is constant but worse after eating. Pain is described as burning and sharp. + bloating.  Pain does at times radiate to the chest. No current chest pain. No SOB. No cough.  + nausea, no vomiting. + weakness and fatigue. No radiation to the back. No diarrhea. No fever. No dizziness. Eating and drinking. Has taken Mylanta and Prilosec      Current Medications[1]   Past Medical History[2]   Social History:  Short Social Hx on File[3]     REVIEW OF SYSTEMS:   GENERAL HEALTH: No fever    CARDIOVASCULAR: See HPI   RESPIRATORY: denies shortness of breath, cough or wheezing  GI:See HPI  : denies urinary complaints  NEURO: denies loss of bowel or bladder control    EXAM:   /64   Pulse 70   Temp 97.3 °F (36.3 °C) (Tympanic)   Resp 18   Ht 5' 6\" (1.676 m)   Wt 167 lb (75.8 kg)   SpO2 100%   BMI 26.95 kg/m²   Physical Exam  Constitutional:       Appearance: He is ill-appearing.   HENT:      Head: Normocephalic.   Eyes:      Conjunctiva/sclera: Conjunctivae normal.   Cardiovascular:      Rate and Rhythm: Normal rate and regular rhythm.   Pulmonary:      Effort: Pulmonary effort is normal.      Breath sounds: Normal breath sounds.   Abdominal:      General: Bowel sounds are normal. There is distension.      Palpations: Abdomen is rigid.      Tenderness: There is abdominal tenderness in the epigastric area.   Musculoskeletal:      Cervical back: Normal range of motion and neck supple.   Neurological:      Mental Status: He is alert.           ASSESSMENT AND PLAN:   Francisco Del Rio is a 79 year old male who  presents with:     ASSESSMENT:  Encounter Diagnosis   Name Primary?    Epigastric pain Yes       PLAN:      Francisco Del Rio is a 79 year old male who presents to [facility] with c/o epigastric pain.  Accompanied by: son  After triage, higher acuity of care was recommended to Francisco Del Rio today.   Rationale: Needs further workup and treatment   Site recommendation: [place] [facility]    Patient was offered transportation to hospital via EMS. Patient and son declined  Son to drive him directly to the ED   Patient/son verbalized understanding of rationale for further evaluation and was stable upon discharge.  Electronically signed,   Vi Lindsey PA-C 6/6/2025, 8:53 AM           [1]   Current Outpatient Medications   Medication Sig Dispense Refill    HYDROcodone-acetaminophen 5-325 MG Oral Tab Take 1 tablet by mouth every 6 (six) hours as needed. 15 tablet 0    simvastatin 40 MG Oral Tab Take 1 tablet (40 mg total) by mouth nightly. 30 tablet 3    Pantoprazole Sodium 40 MG Oral Tab EC Take 1 tablet (40 mg total) by mouth once daily. 90 tablet 1    lisinopril 10 MG Oral Tab Take 0.5 tablets (5 mg total) by mouth daily. 90 tablet 1    MetFORMIN HCl 500 MG Oral Tab 1/2 tab PO q12 hrs. Pre meals. (Patient taking differently: Take 1 tablet (500 mg total) by mouth 2 (two) times daily with meals.) 90 tablet 1    Glucose Blood (FREESTYLE LITE TEST) In Vitro Strip Dx. 250.02. take by Misc.(Non-Drug; Combo Route) route twice a day 100 strip 6    FreeStyle Lancets Does not apply Misc as directed      Blood Glucose Monitoring Suppl (FREESTYLE LITE) Does not apply Device as directed      aspirin 81 MG Oral Tab Take 1 tablet by mouth daily. 90 tablet 1   [2]   Past Medical History:   Diabetes (HCC)    Esophageal reflux    Gastritis    High blood pressure    High cholesterol    Other and unspecified hyperlipidemia   [3]   Social History  Socioeconomic History    Marital status:    Tobacco Use    Smoking status: Some Days      Current packs/day: 0.50     Average packs/day: 0.5 packs/day for 50.0 years (25.0 ttl pk-yrs)     Types: Cigarettes    Smokeless tobacco: Never   Vaping Use    Vaping status: Never Used   Substance and Sexual Activity    Alcohol use: Not Currently     Alcohol/week: 8.3 standard drinks of alcohol     Types: 10 Cans of beer per week    Drug use: No   Other Topics Concern    Caffeine Concern Yes     Comment: Coffee, soda      Social Drivers of Health     Food Insecurity: No Food Insecurity (3/16/2025)    NCSS - Food Insecurity     Worried About Running Out of Food in the Last Year: No     Ran Out of Food in the Last Year: No   Transportation Needs: No Transportation Needs (3/16/2025)    NCSS - Transportation     Lack of Transportation: No   Housing Stability: Not At Risk (3/16/2025)    NCSS - Housing/Utilities     Has Housing: Yes     Worried About Losing Housing: No     Unable to Get Utilities: No

## 2025-06-07 LAB
ANION GAP SERPL CALC-SCNC: 12 MMOL/L (ref 0–18)
BASOPHILS # BLD AUTO: 0.02 X10(3) UL (ref 0–0.2)
BASOPHILS NFR BLD AUTO: 0.3 %
BUN BLD-MCNC: 9 MG/DL (ref 9–23)
BUN/CREAT SERPL: 15 (ref 10–20)
CALCIUM BLD-MCNC: 7.4 MG/DL (ref 8.7–10.4)
CHLORIDE SERPL-SCNC: 106 MMOL/L (ref 98–112)
CO2 SERPL-SCNC: 18 MMOL/L (ref 21–32)
CREAT BLD-MCNC: 0.6 MG/DL (ref 0.7–1.3)
DEPRECATED RDW RBC AUTO: 41.9 FL (ref 35.1–46.3)
EGFRCR SERPLBLD CKD-EPI 2021: 98 ML/MIN/1.73M2 (ref 60–?)
EOSINOPHIL # BLD AUTO: 0.03 X10(3) UL (ref 0–0.7)
EOSINOPHIL NFR BLD AUTO: 0.5 %
ERYTHROCYTE [DISTWIDTH] IN BLOOD BY AUTOMATED COUNT: 13.3 % (ref 11–15)
GLUCOSE BLD-MCNC: 72 MG/DL (ref 70–99)
GLUCOSE BLDC GLUCOMTR-MCNC: 108 MG/DL (ref 70–99)
GLUCOSE BLDC GLUCOMTR-MCNC: 122 MG/DL (ref 70–99)
GLUCOSE BLDC GLUCOMTR-MCNC: 140 MG/DL (ref 70–99)
GLUCOSE BLDC GLUCOMTR-MCNC: 140 MG/DL (ref 70–99)
HCT VFR BLD AUTO: 23.2 % (ref 39–53)
HCT VFR BLD AUTO: 28 % (ref 39–53)
HGB BLD-MCNC: 7.6 G/DL (ref 13–17.5)
HGB BLD-MCNC: 9.3 G/DL (ref 13–17.5)
IMM GRANULOCYTES # BLD AUTO: 0.09 X10(3) UL (ref 0–1)
IMM GRANULOCYTES NFR BLD: 1.5 %
LIPASE SERPL-CCNC: 36 U/L (ref 12–53)
LYMPHOCYTES # BLD AUTO: 1.28 X10(3) UL (ref 1–4)
LYMPHOCYTES NFR BLD AUTO: 21.7 %
MCH RBC QN AUTO: 28.3 PG (ref 26–34)
MCHC RBC AUTO-ENTMCNC: 32.8 G/DL (ref 31–37)
MCV RBC AUTO: 86.2 FL (ref 80–100)
MONOCYTES # BLD AUTO: 0.66 X10(3) UL (ref 0.1–1)
MONOCYTES NFR BLD AUTO: 11.2 %
NEUTROPHILS # BLD AUTO: 3.81 X10 (3) UL (ref 1.5–7.7)
NEUTROPHILS # BLD AUTO: 3.81 X10(3) UL (ref 1.5–7.7)
NEUTROPHILS NFR BLD AUTO: 64.8 %
OSMOLALITY SERPL CALC.SUM OF ELEC: 279 MOSM/KG (ref 275–295)
PLATELET # BLD AUTO: 117 10(3)UL (ref 150–450)
PLATELETS.RETICULATED NFR BLD AUTO: 3.1 % (ref 0–7)
POTASSIUM SERPL-SCNC: 4.1 MMOL/L (ref 3.5–5.1)
RBC # BLD AUTO: 2.69 X10(6)UL (ref 3.8–5.8)
SODIUM SERPL-SCNC: 136 MMOL/L (ref 136–145)
WBC # BLD AUTO: 5.9 X10(3) UL (ref 4–11)

## 2025-06-07 PROCEDURE — 99233 SBSQ HOSP IP/OBS HIGH 50: CPT | Performed by: HOSPITALIST

## 2025-06-07 RX ORDER — ATORVASTATIN CALCIUM 20 MG/1
20 TABLET, FILM COATED ORAL NIGHTLY
Status: DISCONTINUED | OUTPATIENT
Start: 2025-06-07 | End: 2025-06-09

## 2025-06-07 RX ORDER — PANTOPRAZOLE SODIUM 40 MG/1
40 TABLET, DELAYED RELEASE ORAL
Status: DISCONTINUED | OUTPATIENT
Start: 2025-06-08 | End: 2025-06-09

## 2025-06-07 RX ORDER — SODIUM CHLORIDE, SODIUM LACTATE, POTASSIUM CHLORIDE, CALCIUM CHLORIDE 600; 310; 30; 20 MG/100ML; MG/100ML; MG/100ML; MG/100ML
INJECTION, SOLUTION INTRAVENOUS CONTINUOUS
Status: DISCONTINUED | OUTPATIENT
Start: 2025-06-07 | End: 2025-06-09

## 2025-06-07 NOTE — PROGRESS NOTES
[facility]  Gastroenterology Progress Note    Francisco Del Rio Patient Status:  Inpatient    10/24/1945 MRN [id number]   Location [facility] 5SW/SE Attending Aaron Dela Cruz MD   Hosp Day # 1 PCP IRIS BOLAÑOS MD     Subjective:  Francisco Del Rio is a(n) 79 year old male.    Current complaints: Feels better.  No abdominal pain.  Tolerating solid food without nausea or pain.    Objective:  Blood pressure 125/52, pulse 64, temperature 98.3 °F (36.8 °C), temperature source Oral, resp. rate 18, height 5' 3\" (1.6 m), weight 165 lb 11.2 oz (75.2 kg), SpO2 98%.  Respiratory: no labored breathing  CV: RRR  Abdomen: nondistended, soft, nontender, no ecchymoses over the flanks or periumbilical region  Extremities: no calf tenderness  Neurologic: Ox3    Labs:   Lab Results   Component Value Date    WBC 5.9 2025    HGB 7.6 2025    HCT 23.2 2025    .0 2025    CREATSERUM 0.60 2025    BUN 9 2025     2025    K 4.1 2025     2025    CO2 18.0 2025    GLU 72 2025    CA 7.4 2025    LIP 36 2025       Imaging:  CT ABDOMEN+PELVIS(CONTRAST ONLY)(CPT=74177)  Result Date: 2025  CONCLUSION:  1. Interval development of a large multilobulated peripherally enhancing thick walled 8.6 x 6.5 cm collection at the pancreatic neck/body with intrinsic foci of gas since comparison abdominal CT . There is a smaller thick walled collection of fluid and gas in the uncinate process that spans 4.2 x 2.2 cm, which is also new since prior.  Findings are concerning for multifocal walled-off pancreatic necrosis secondary to necrotizing pancreatitis.  There is associated narrowing and probable partial occlusion of the splenic vein, upper superior mesenteric vein, and base of the main portal vein; this finding is new since comparison exam.  Gastroenterology evaluation is recommended. 2. Mild peripancreatic and upper mesenteric inflammatory  stranding, which is likely reactive to aforementioned subacute necrotizing pancreatitis.  Trace free fluid in the pelvis is also likely reactive. 3. Mild splenomegaly. 4. Stable 3.2 cm fusiform infrarenal abdominal aortic aneurysm.  Coronary and peripheral atherosclerosis. 5. Colonic diverticulosis. 6. Prostatomegaly. Circumferential urinary bladder wall thickening, which may relate to incomplete distention, chronic partial outlet obstruction from prostate gland enlargement, or cystitis.  If there are referable symptoms, urinalysis correlation is requested. 7. Lesser incidental findings as above.     elm-remote  Dictated by (CST): Alexi Cardenas MD on 6/06/2025 at 11:15 AM     Finalized by (CST): Alexi Cardenas MD on 6/06/2025 at 11:27 AM          XR CHEST AP PORTABLE  (CPT=71045)  Result Date: 6/6/2025  CONCLUSION: Slight cardiac enlargement with minimal increased prominence of pulmonary vascularity.  However no acute abnormalities identified.    Dictated by (CST): Teofilo Motley MD on 6/06/2025 at 9:49 AM     Finalized by (CST): Teofilo Motley MD on 6/06/2025 at 9:53 AM            Problem list:  Problem List[1]    Assessment/Plan:  Necrotizing pancreatitis in the setting of heavy past alcohol use for 40 years (abstinent for 10 years) and chronic tobacco use.   Symptomatically improved overnight.  Tolerating solid foods.  Significant drop in hemoglobin overnight without ecchymoses or bowel movements yet.  Check stool for occult blood and repeat hemoglobin at noon today.  Repeat CT scan for retroperitoneal bleed if drops further.  May decrease IV hydration.    Note: This chart was prepared using voice recognition software and may contain unintended word substitution errors.   Carlo Cox MD FACG  6/7/2025  9:37 AM         [1]   Patient Active Problem List  Diagnosis    Type II diabetes mellitus, uncontrolled    Hypercholesterolemia    Hypertension, benign    Tobacco use disorder    Acute renal failure     Herpes zoster    Pancreatitis (HCC)    Acute pancreatitis, unspecified complication status, unspecified pancreatitis type (HCC)    Acute cholecystitis    Hyponatremia    Thrombocytopenia (HCC)    Hyperglycemia    Acute necrotizing pancreatitis (HCC)

## 2025-06-07 NOTE — PLAN OF CARE
Problem: Patient Centered Care  Goal: Patient preferences are identified and integrated in the patient's plan of care  Description: Interventions:  - What would you like us to know as we care for you?   - Provide timely, complete, and accurate information to patient/family  - Incorporate patient and family knowledge, values, beliefs, and cultural backgrounds into the planning and delivery of care  - Encourage patient/family to participate in care and decision-making at the level they choose  - Honor patient and family perspectives and choices  Outcome: Progressing     Problem: Diabetes/Glucose Control  Goal: Glucose maintained within prescribed range  Description: INTERVENTIONS:  - Monitor Blood Glucose as ordered  - Assess for signs and symptoms of hyperglycemia and hypoglycemia  - Administer ordered medications to maintain glucose within target range  - Assess barriers to adequate nutritional intake and initiate nutrition consult as needed  - Instruct patient on self management of diabetes  Outcome: Progressing     Problem: Patient/Family Goals  Goal: Patient/Family Long Term Goal  Description: Patient's Long Term Goal:     Interventions:  - See additional Care Plan goals for specific interventions  Outcome: Progressing  Goal: Patient/Family Short Term Goal  Description: Patient's Short Term Goal:     Interventions:   - See additional Care Plan goals for specific interventions  Outcome: Progressing     Problem: GASTROINTESTINAL - ADULT  Goal: Minimal or absence of nausea and vomiting  Description: INTERVENTIONS:  - Maintain adequate hydration with IV or PO as ordered and tolerated  - Nasogastric tube to low intermittent suction as ordered  - Evaluate effectiveness of ordered antiemetic medications  - Provide nonpharmacologic comfort measures as appropriate  - Advance diet as tolerated, if ordered  - Obtain nutritional consult as needed  - Evaluate fluid balance  Outcome: Progressing  Goal: Maintains or returns  to baseline bowel function  Description: INTERVENTIONS:  - Assess bowel function  - Maintain adequate hydration with IV or PO as ordered and tolerated  - Evaluate effectiveness of GI medications  - Encourage mobilization and activity  - Obtain nutritional consult as needed  - Establish a toileting routine/schedule  - Consider collaborating with pharmacy to review patient's medication profile  Outcome: Progressing     Problem: METABOLIC/FLUID AND ELECTROLYTES - ADULT  Goal: Glucose maintained within prescribed range  Description: INTERVENTIONS:  - Monitor Blood Glucose as ordered  - Assess for signs and symptoms of hyperglycemia and hypoglycemia  - Administer ordered medications to maintain glucose within target range  - Assess barriers to adequate nutritional intake and initiate nutrition consult as needed  - Instruct patient on self management of diabetes  Outcome: Progressing     Problem: PAIN - ADULT  Goal: Verbalizes/displays adequate comfort level or patient's stated pain goal  Description: INTERVENTIONS:  - Encourage pt to monitor pain and request assistance  - Assess pain using appropriate pain scale  - Administer analgesics based on type and severity of pain and evaluate response  - Implement non-pharmacological measures as appropriate and evaluate response  - Consider cultural and social influences on pain and pain management  - Manage/alleviate anxiety  - Utilize distraction and/or relaxation techniques  - Monitor for opioid side effects  - Notify MD/LIP if interventions unsuccessful or patient reports new pain  - Anticipate increased pain with activity and pre-medicate as appropriate  Outcome: Progressing     Problem: SAFETY ADULT - FALL  Goal: Free from fall injury  Description: INTERVENTIONS:  - Assess pt frequently for physical needs  - Identify cognitive and physical deficits and behaviors that affect risk of falls.  - [place] fall precautions as indicated by assessment.  - Educate pt/family on  patient safety including physical limitations  - Instruct pt to call for assistance with activity based on assessment  - Modify environment to reduce risk of injury  - Provide assistive devices as appropriate  - Consider OT/PT consult to assist with strengthening/mobility  - Encourage toileting schedule  Outcome: Progressing

## 2025-06-07 NOTE — PLAN OF CARE
No acute changes. PRN morphine for abdominal pain. Safety precautions in place.    Problem: Patient Centered Care  Goal: Patient preferences are identified and integrated in the patient's plan of care  Description: Interventions:  - What would you like us to know as we care for you? From home alone  - Provide timely, complete, and accurate information to patient/family  - Incorporate patient and family knowledge, values, beliefs, and cultural backgrounds into the planning and delivery of care  - Encourage patient/family to participate in care and decision-making at the level they choose  - Honor patient and family perspectives and choices  Outcome: Progressing     Problem: Diabetes/Glucose Control  Goal: Glucose maintained within prescribed range  Description: INTERVENTIONS:  - Monitor Blood Glucose as ordered  - Assess for signs and symptoms of hyperglycemia and hypoglycemia  - Administer ordered medications to maintain glucose within target range  - Assess barriers to adequate nutritional intake and initiate nutrition consult as needed  - Instruct patient on self management of diabetes  Outcome: Progressing     Problem: GASTROINTESTINAL - ADULT  Goal: Minimal or absence of nausea and vomiting  Description: INTERVENTIONS:  - Maintain adequate hydration with IV or PO as ordered and tolerated  - Nasogastric tube to low intermittent suction as ordered  - Evaluate effectiveness of ordered antiemetic medications  - Provide nonpharmacologic comfort measures as appropriate  - Advance diet as tolerated, if ordered  - Obtain nutritional consult as needed  - Evaluate fluid balance  Outcome: Progressing  Goal: Maintains or returns to baseline bowel function  Description: INTERVENTIONS:  - Assess bowel function  - Maintain adequate hydration with IV or PO as ordered and tolerated  - Evaluate effectiveness of GI medications  - Encourage mobilization and activity  - Obtain nutritional consult as needed  - Establish a toileting  routine/schedule  - Consider collaborating with pharmacy to review patient's medication profile  Outcome: Progressing     Problem: METABOLIC/FLUID AND ELECTROLYTES - ADULT  Goal: Glucose maintained within prescribed range  Description: INTERVENTIONS:  - Monitor Blood Glucose as ordered  - Assess for signs and symptoms of hyperglycemia and hypoglycemia  - Administer ordered medications to maintain glucose within target range  - Assess barriers to adequate nutritional intake and initiate nutrition consult as needed  - Instruct patient on self management of diabetes  Outcome: Progressing

## 2025-06-07 NOTE — PROGRESS NOTES
[facility]  part of [facility]  Hospitalist Progress Note     Francisco  Quang Patient Status:  Inpatient    10/24/1945  79 year old CSN [id number]   Location 526/526-A Attending Aaron Dela Cruz MD   Hosp Day # 1 PCP IRIS BOLAÑOS MD     Assessment & Plan:   ----------------------------------  Acute necrotizing pancreatitis.  Patient was here in late March with acute pancreatitis.  Workup was not revealing for specific etiology.  Feeling much better already today.  Tolerating diet.  - GI following  - Continue current diet  - Decrease IV fluids  - Pain control  - Defer further imaging to GI    Hemoglobin drop.  No physical exam findings to suggest retroperitoneal bleed though he is at risk with his necrotizing pancreatitis.  Baseline hemoglobin is around 13, admitted with 11.6 and now is 7.6.  Hemodynamically stable  - Repeat hemoglobin 12 PM  - Repeat CT scan if lower  - Monitor blood pressure heart rate closely    Other problems  Type 2 diabetes  Hypertension  Dyslipidemia  CKD stage II  Gastritis  GERD    Supplementary Documentation:   DVT Mechanical Prophylaxis:   SCDs,    DVT Pharmacologic Prophylaxis   Medication   None                Code Status: Not on file  Skinner: No urinary catheter in place  Skinner Duration (in days):   Central line:    KRISHAN: 2025    **Certification      PHYSICIAN Certification of Need for Inpatient Hospitalization - Initial Certification    Patient will require inpatient services that will reasonably be expected to span two midnight's based on the clinical documentation in H+P.   Based on patients current state of illness, I anticipate that, after discharge, patient will require TBD.           I personally reviewed the available laboratories, imaging including. I discussed/will discuss the case with consultants. I ordered laboratories and/or radiographic studies. I adjusted medications as detailed above.  Medical decision making high, risk is high.  Discussed with  son at the bedside    Subjective:   ----------------------------------  Abdominal pain is completely gone.  Tolerating current diet.  No black stools or red stools.      Objective:   Chief Complaint:   Chief Complaint   Patient presents with    Epigastric Pain     ----------------------------------  Temp:  [98.3 °F (36.8 °C)-99.8 °F (37.7 °C)] 98.3 °F (36.8 °C)  Pulse:  [54-78] 64  Resp:  [16-20] 18  BP: (110-159)/(52-69) 125/52  SpO2:  [93 %-98 %] 98 %  Gen: A+Ox3.  No distress.   HEENT: NCAT, neck supple, no carotid bruit.  CV: RRR, S1S2, and intact distal pulses. No gallop, rub, murmur.  Pulm: Effort and breath sounds normal. No distress, wheezes, rales, rhonchi.  Abd: Soft, NTND, BS normal, no mass, no HSM, no rebound/guarding.  No flank bruising.  Neuro: Normal reflexes, CN. Sensory/motor exams grossly normal deficit.   MS: No joint effusions.  No peripheral edema.  Skin: Skin is warm and dry. No rashes, erythema, diaphoresis.   Psych: Normal mood and affect. Calm, cooperative    Labs:  Lab Results   Component Value Date    HGB 7.6 (L) 06/07/2025    WBC 5.9 06/07/2025    .0 (L) 06/07/2025     06/07/2025    K 4.1 06/07/2025    CREATSERUM 0.60 (L) 06/07/2025    INR 0.96 03/16/2025    AST 19 06/06/2025    ALT 12 06/06/2025           Scheduled Medications[1]  PRN Medications[2]             [1]    [START ON 6/8/2025] pantoprazole  40 mg Oral Daily    atorvastatin  20 mg Oral Nightly    insulin aspart  1-5 Units Subcutaneous TID CC   [2]   acetaminophen    morphINE **OR** morphINE **OR** morphINE    ondansetron    metoclopramide    glucose **OR** glucose **OR** glucose-vitamin C **OR** dextrose **OR** glucose **OR** glucose **OR** glucose-vitamin C

## 2025-06-07 NOTE — PLAN OF CARE
Problem: Patient Centered Care  Goal: Patient preferences are identified and integrated in the patient's plan of care  Description: Interventions:  - What would you like us to know as we care for you?   - Provide timely, complete, and accurate information to patient/family  - Incorporate patient and family knowledge, values, beliefs, and cultural backgrounds into the planning and delivery of care  - Encourage patient/family to participate in care and decision-making at the level they choose  - Honor patient and family perspectives and choices  Outcome: Progressing     Problem: Diabetes/Glucose Control  Goal: Glucose maintained within prescribed range  Description: INTERVENTIONS:  - Monitor Blood Glucose as ordered  - Assess for signs and symptoms of hyperglycemia and hypoglycemia  - Administer ordered medications to maintain glucose within target range  - Assess barriers to adequate nutritional intake and initiate nutrition consult as needed  - Instruct patient on self management of diabetes  Outcome: Progressing     Problem: Patient/Family Goals  Goal: Patient/Family Long Term Goal  Description: Patient's Long Term Goal: discharge from hospital     Interventions:  - Monitor vitals, labs, imaging   - Follow MD's orders   - Administer medications per order    - See additional Care Plan goals for specific interventions  Outcome: Progressing  Goal: Patient/Family Short Term Goal  Description: Patient's Short Term Goal: decrease pain    Interventions:   - Monitor vitals, labs, imaging   - Follow MD's orders   - Administer medications per order    - See additional Care Plan goals for specific interventions  Outcome: Progressing     Problem: GASTROINTESTINAL - ADULT  Goal: Minimal or absence of nausea and vomiting  Description: INTERVENTIONS:  - Maintain adequate hydration with IV or PO as ordered and tolerated  - Nasogastric tube to low intermittent suction as ordered  - Evaluate effectiveness of ordered antiemetic  medications  - Provide nonpharmacologic comfort measures as appropriate  - Advance diet as tolerated, if ordered  - Obtain nutritional consult as needed  - Evaluate fluid balance  Outcome: Progressing  Goal: Maintains or returns to baseline bowel function  Description: INTERVENTIONS:  - Assess bowel function  - Maintain adequate hydration with IV or PO as ordered and tolerated  - Evaluate effectiveness of GI medications  - Encourage mobilization and activity  - Obtain nutritional consult as needed  - Establish a toileting routine/schedule  - Consider collaborating with pharmacy to review patient's medication profile  Outcome: Progressing     Problem: METABOLIC/FLUID AND ELECTROLYTES - ADULT  Goal: Glucose maintained within prescribed range  Description: INTERVENTIONS:  - Monitor Blood Glucose as ordered  - Assess for signs and symptoms of hyperglycemia and hypoglycemia  - Administer ordered medications to maintain glucose within target range  - Assess barriers to adequate nutritional intake and initiate nutrition consult as needed  - Instruct patient on self management of diabetes  Outcome: Progressing     Problem: PAIN - ADULT  Goal: Verbalizes/displays adequate comfort level or patient's stated pain goal  Description: INTERVENTIONS:  - Encourage pt to monitor pain and request assistance  - Assess pain using appropriate pain scale  - Administer analgesics based on type and severity of pain and evaluate response  - Implement non-pharmacological measures as appropriate and evaluate response  - Consider cultural and social influences on pain and pain management  - Manage/alleviate anxiety  - Utilize distraction and/or relaxation techniques  - Monitor for opioid side effects  - Notify MD/LIP if interventions unsuccessful or patient reports new pain  - Anticipate increased pain with activity and pre-medicate as appropriate  Outcome: Progressing     Problem: SAFETY ADULT - FALL  Goal: Free from fall injury  Description:  INTERVENTIONS:  - Assess pt frequently for physical needs  - Identify cognitive and physical deficits and behaviors that affect risk of falls.  - [place] fall precautions as indicated by assessment.  - Educate pt/family on patient safety including physical limitations  - Instruct pt to call for assistance with activity based on assessment  - Modify environment to reduce risk of injury  - Provide assistive devices as appropriate  - Consider OT/PT consult to assist with strengthening/mobility  - Encourage toileting schedule  Outcome: Progressing

## 2025-06-07 NOTE — CM/SW NOTE
Per care everywhere, patient is current with [facility] for RN. HH referral sent via aidin to [facility]. SW entered NELL. If patient requires PT/OT, SW/CM is to place f2f. Unsigned NELL sent via aidin.     SW/CM to remain available for support and/or discharge planning.     Arline Garcia, MSW, LSW   x [phone]

## 2025-06-07 NOTE — DIETARY NOTE
ADULT NUTRITION INITIAL ASSESSMENT    Pt is at moderate nutrition risk.  Pt does not meet malnutrition criteria.      RECOMMENDATIONS TO MD: See Nutrition Intervention for ONS specifics     ADMITTING DIAGNOSIS:  Acute necrotizing pancreatitis (HCC) [K85.91]  PERTINENT PAST MEDICAL HISTORY: Past Medical History[1]    PATIENT STATUS: Initial 06/07/25: Pt assessed due to consult for \"weight loss of 20# since March per pt, son, and dtr\" and assessed for MST score of 3 for weight loss and declined PO intake. Per EMR significant wt loss of 7.8% x 3 months noted. Visited pt a bedside with dtr present. Pt reports his intake had originally declined at this beginning of the year because of having his teeth pulled for dentures. He was unable to eat as much as he usually does and ended up pureeing most of his meats in order to start eating adequately again. Then he began having issues with pancreatitis in March and he has had some weight loss since then. Dx of necrotizing pancreatitis noted per chart review. Pt reports feeling much better at this time. He is tolerating his low fiber/soft/low fat diet at this time and eating 80-90% of his meals so far. Pt requesting \"softer\" meat options. RD provided pt with minced moist and pureed menus to order from as well since he preferred this texture of meat at home PTA. Pt happy with the menus and new options. Pt also requesting change of ONS. Agreeable to Glucerna and SF shake each daily to trial.     FOOD/NUTRITION RELATED HISTORY:  Appetite: Good  Intake: 80-90% of all meals so far  Intake Meeting Needs: Yes, and oral nutrition supplements (ONS) to maximize  Percent Meals Eaten (last 6 days)       Date/Time Percent Meals Eaten (%)    06/06/25 1400 0 %     Percent Meals Eaten (%): NPO at 06/06/25 1400    06/06/25 1900 80 %    06/07/25 1011 90 %    06/07/25 1451 90 %           Food Allergies: No Known Food Allergies (NKFA)  Cultural/Ethnic/Catholic Preferences:  None    GASTROINTESTINAL: no GI issues reported per pt/chart and +BM 6/6    MEDICATIONS: reviewed  Scheduled Medications[2]  LABS: reviewed  Recent Labs     06/06/25  0921 06/07/25  0538   * 72   BUN 21 9   CREATSERUM 1.43* 0.60*   CA 9.9 7.4*   * 136   K 5.0 4.1    106   CO2 27.0 18.0*   OSMOCALC 287 279       WEIGHT HISTORY:  Patient Weight(s) for the past 336 hrs:   Weight   06/06/25 1308 75.2 kg (165 lb 11.2 oz)   06/06/25 1303 75.2 kg (165 lb 11.2 oz)   06/06/25 0856 75.8 kg (167 lb)     Wt Readings from Last 10 Encounters:   06/06/25 75.2 kg (165 lb 11.2 oz)   06/06/25 75.8 kg (167 lb)   03/16/25 81.3 kg (179 lb 3.2 oz)   03/16/25 89.8 kg (197 lb 15.6 oz)   08/25/23 89.8 kg (198 lb)   06/22/21 90.7 kg (200 lb)   08/25/16 98.9 kg (218 lb)   03/22/16 98 kg (216 lb)   03/15/16 99.8 kg (220 lb)   09/01/15 100.7 kg (222 lb)       ANTHROPOMETRICS:  HT: 160 cm (5' 3\")  Wt Readings from Last 1 Encounters:   06/06/25 75.2 kg (165 lb 11.2 oz)     Last weight: Likely accurate  BMI: Body mass index is 29.35 kg/m².  Dosing Weight: 75.2 kg - taken on 6/6, utilized for anthropometric calculations  BMI CLASSIFICATION: 25-29.9 kg/m2 - overweight  IBW/lbs (Calculated) Male: 124 lbs             133% IBW  Usual Body Wt: ~190's lbs       ~86% UBW    NUTRITION RELATED PHYSICAL FINDINGS:  - Nutrition Focused Physical Exam (NFPE): no wasting noted  - Fluid Accumulation: none . See RN documentation for details  - Skin Integrity: intact. See RN documentation for details      NUTRITION DIAGNOSIS/PROBLEM:   Unintended wt loss related to Decreased ability to consume sufficient energy and physiological causes increasing nutrient needs (teeth pulled for dentures and in the setting of necrotizing pancreatitis) as evidenced by 14#/7.8% weight loss x 3 months and pt's report of inability to take adequate PO while mouth was healing.     NUTRITION INTERVENTION:     NUTRITION PRESCRIPTION:   Estimated Nutrition needs: --dosing  wt of 75.2 kg - wt taken on 6/6  Calories: 1577-7630 calories/day (20-23 calories per kg Dosing wt)  Protein: 75-90 g protein/day (1-1.2 g protein/kg Dosing wt)    - Diet:       Procedures    Low Fiber/Soft diet Low Fiber/Soft, Low Fat; Is Patient on Accuchecks? Yes      - Nutrition Care Plan: Initiated ONS (oral nutritional supplements), Educated patient/family on ways to increase oral intake, and Provided pt with alternate textured diets to choose from as he prefers minced moist/pureed meats.   - ONS (Oral Nutrition Supplements)/Meals/Snacks: Glucerna (220 calories/ 10 g protein each) Daily Vanilla and SF Shake (200 calories/ 7 g protein each) Daily Chocolate   - Vitamin and mineral supplements: none  - Feeding assistance: independent  - Nutrition education: Discussed importance of adequate energy and protein intake    - Coordination of nutrition care: collaboration with other providers  - Discharge and transfer of nutrition care to new setting or provider: monitor plans; from home alone    MONITOR AND EVALUATE/NUTRITION GOALS:  - Food and Nutrient Intake:      Monitor: adequacy of PO intake and adequacy of supplement intake  - Food and Nutrient Administration:      Monitor: N/A  - Anthropometric Measurement:    Monitor weight  - Nutrition Goals:      maintain wt within 5%, PO and supplement greater than 75% of needs, and good supplement intake    DIETITIAN FOLLOW UP: RD to follow and monitor nutrition status      Gabriela Baig RD, LDN  Clinical Dietitian  P: [phone]         [1]   Past Medical History:   Diabetes (HCC)    Esophageal reflux    Gastritis    High blood pressure    High cholesterol    Other and unspecified hyperlipidemia   [2]    [START ON 6/8/2025] pantoprazole  40 mg Oral Daily    atorvastatin  20 mg Oral Nightly    insulin aspart  1-5 Units Subcutaneous TID CC

## 2025-06-07 NOTE — PROGRESS NOTES
06/07/25 1548   Vital Signs   Temp 100.1 °F (37.8 °C)   Temp src Oral   Pulse 66   Heart Rate Source Monitor   Resp 18   Respiratory Quality Normal   /88   MAP (mmHg) 97   BP Location Right arm   BP Method Automatic   Patient Position Lying   Oxygen Therapy   SpO2 96 %   O2 Device None (Room air)     Service aware of temp.

## 2025-06-08 ENCOUNTER — APPOINTMENT (OUTPATIENT)
Dept: CV DIAGNOSTICS | Facility: HOSPITAL | Age: 80
End: 2025-06-08
Attending: NURSE PRACTITIONER
Payer: MEDICARE

## 2025-06-08 LAB
ALBUMIN SERPL-MCNC: 3.5 G/DL (ref 3.2–4.8)
ALBUMIN/GLOB SERPL: 1.5 {RATIO} (ref 1–2)
ALP LIVER SERPL-CCNC: 85 U/L (ref 45–117)
ALT SERPL-CCNC: 20 U/L (ref 10–49)
ANION GAP SERPL CALC-SCNC: 6 MMOL/L (ref 0–18)
ANION GAP SERPL CALC-SCNC: 8 MMOL/L (ref 0–18)
AST SERPL-CCNC: 24 U/L (ref ?–34)
BASOPHILS # BLD AUTO: 0.03 X10(3) UL (ref 0–0.2)
BASOPHILS # BLD AUTO: 0.05 X10(3) UL (ref 0–0.2)
BASOPHILS NFR BLD AUTO: 0.5 %
BASOPHILS NFR BLD AUTO: 1 %
BILIRUB SERPL-MCNC: 0.5 MG/DL (ref 0.2–1.1)
BUN BLD-MCNC: 12 MG/DL (ref 9–23)
BUN BLD-MCNC: 12 MG/DL (ref 9–23)
BUN/CREAT SERPL: 11.5 (ref 10–20)
BUN/CREAT SERPL: 11.5 (ref 10–20)
CALCIUM BLD-MCNC: 8.7 MG/DL (ref 8.7–10.4)
CALCIUM BLD-MCNC: 9.1 MG/DL (ref 8.7–10.4)
CHLORIDE SERPL-SCNC: 105 MMOL/L (ref 98–112)
CHLORIDE SERPL-SCNC: 106 MMOL/L (ref 98–112)
CO2 SERPL-SCNC: 26 MMOL/L (ref 21–32)
CO2 SERPL-SCNC: 27 MMOL/L (ref 21–32)
CREAT BLD-MCNC: 1.04 MG/DL (ref 0.7–1.3)
CREAT BLD-MCNC: 1.04 MG/DL (ref 0.7–1.3)
DEPRECATED RDW RBC AUTO: 40.3 FL (ref 35.1–46.3)
DEPRECATED RDW RBC AUTO: 40.6 FL (ref 35.1–46.3)
EGFRCR SERPLBLD CKD-EPI 2021: 73 ML/MIN/1.73M2 (ref 60–?)
EGFRCR SERPLBLD CKD-EPI 2021: 73 ML/MIN/1.73M2 (ref 60–?)
EOSINOPHIL # BLD AUTO: 0.09 X10(3) UL (ref 0–0.7)
EOSINOPHIL # BLD AUTO: 0.09 X10(3) UL (ref 0–0.7)
EOSINOPHIL NFR BLD AUTO: 1.4 %
EOSINOPHIL NFR BLD AUTO: 1.8 %
ERYTHROCYTE [DISTWIDTH] IN BLOOD BY AUTOMATED COUNT: 12.9 % (ref 11–15)
ERYTHROCYTE [DISTWIDTH] IN BLOOD BY AUTOMATED COUNT: 13.1 % (ref 11–15)
GLOBULIN PLAS-MCNC: 2.3 G/DL (ref 2–3.5)
GLUCOSE BLD-MCNC: 107 MG/DL (ref 70–99)
GLUCOSE BLD-MCNC: 123 MG/DL (ref 70–99)
GLUCOSE BLDC GLUCOMTR-MCNC: 121 MG/DL (ref 70–99)
GLUCOSE BLDC GLUCOMTR-MCNC: 125 MG/DL (ref 70–99)
GLUCOSE BLDC GLUCOMTR-MCNC: 152 MG/DL (ref 70–99)
GLUCOSE BLDC GLUCOMTR-MCNC: 223 MG/DL (ref 70–99)
HCT VFR BLD AUTO: 29.9 % (ref 39–53)
HCT VFR BLD AUTO: 31.2 % (ref 39–53)
HGB BLD-MCNC: 10 G/DL (ref 13–17.5)
HGB BLD-MCNC: 10.4 G/DL (ref 13–17.5)
IMM GRANULOCYTES # BLD AUTO: 0.19 X10(3) UL (ref 0–1)
IMM GRANULOCYTES # BLD AUTO: 0.25 X10(3) UL (ref 0–1)
IMM GRANULOCYTES NFR BLD: 3.7 %
IMM GRANULOCYTES NFR BLD: 3.9 %
LYMPHOCYTES # BLD AUTO: 1.3 X10(3) UL (ref 1–4)
LYMPHOCYTES # BLD AUTO: 1.83 X10(3) UL (ref 1–4)
LYMPHOCYTES NFR BLD AUTO: 25.6 %
LYMPHOCYTES NFR BLD AUTO: 28.4 %
MAGNESIUM SERPL-MCNC: 1.7 MG/DL (ref 1.6–2.6)
MCH RBC QN AUTO: 28 PG (ref 26–34)
MCH RBC QN AUTO: 28.6 PG (ref 26–34)
MCHC RBC AUTO-ENTMCNC: 33.3 G/DL (ref 31–37)
MCHC RBC AUTO-ENTMCNC: 33.4 G/DL (ref 31–37)
MCV RBC AUTO: 83.8 FL (ref 80–100)
MCV RBC AUTO: 85.7 FL (ref 80–100)
MONOCYTES # BLD AUTO: 0.48 X10(3) UL (ref 0.1–1)
MONOCYTES # BLD AUTO: 0.65 X10(3) UL (ref 0.1–1)
MONOCYTES NFR BLD AUTO: 10.1 %
MONOCYTES NFR BLD AUTO: 9.4 %
NEUTROPHILS # BLD AUTO: 2.97 X10 (3) UL (ref 1.5–7.7)
NEUTROPHILS # BLD AUTO: 2.97 X10(3) UL (ref 1.5–7.7)
NEUTROPHILS # BLD AUTO: 3.6 X10 (3) UL (ref 1.5–7.7)
NEUTROPHILS # BLD AUTO: 3.6 X10(3) UL (ref 1.5–7.7)
NEUTROPHILS NFR BLD AUTO: 55.7 %
NEUTROPHILS NFR BLD AUTO: 58.5 %
OSMOLALITY SERPL CALC.SUM OF ELEC: 288 MOSM/KG (ref 275–295)
OSMOLALITY SERPL CALC.SUM OF ELEC: 289 MOSM/KG (ref 275–295)
PLATELET # BLD AUTO: 123 10(3)UL (ref 150–450)
PLATELET # BLD AUTO: 133 10(3)UL (ref 150–450)
POTASSIUM SERPL-SCNC: 4.2 MMOL/L (ref 3.5–5.1)
POTASSIUM SERPL-SCNC: 4.2 MMOL/L (ref 3.5–5.1)
POTASSIUM SERPL-SCNC: 4.3 MMOL/L (ref 3.5–5.1)
PROT SERPL-MCNC: 5.8 G/DL (ref 5.7–8.2)
RBC # BLD AUTO: 3.57 X10(6)UL (ref 3.8–5.8)
RBC # BLD AUTO: 3.64 X10(6)UL (ref 3.8–5.8)
SODIUM SERPL-SCNC: 139 MMOL/L (ref 136–145)
SODIUM SERPL-SCNC: 139 MMOL/L (ref 136–145)
TROPONIN I SERPL HS-MCNC: 6 NG/L (ref ?–53)
TSI SER-ACNC: 2.4 UIU/ML (ref 0.55–4.78)
WBC # BLD AUTO: 5.1 X10(3) UL (ref 4–11)
WBC # BLD AUTO: 6.5 X10(3) UL (ref 4–11)

## 2025-06-08 PROCEDURE — 93306 TTE W/DOPPLER COMPLETE: CPT | Performed by: NURSE PRACTITIONER

## 2025-06-08 PROCEDURE — 99233 SBSQ HOSP IP/OBS HIGH 50: CPT | Performed by: HOSPITALIST

## 2025-06-08 RX ORDER — MAGNESIUM OXIDE 400 MG/1
400 TABLET ORAL ONCE
Status: COMPLETED | OUTPATIENT
Start: 2025-06-08 | End: 2025-06-08

## 2025-06-08 NOTE — PROGRESS NOTES
[facility]  Gastroenterology Progress Note    Francisco Del Rio Patient Status:  Inpatient    10/24/1945 MRN [id number]   Location [facility] 5SW/SE Attending Aaron Dela Cruz MD   Hosp Day # 2 PCP IRIS BOLAÑOS MD     Subjective:  Francisco Del Rio is a(n) 79 year old male.    Current complaints: Feels better.  No abdominal pain.  Tolerating solid food without nausea or pain.    Objective:  Blood pressure 131/60, pulse 53, temperature 98.2 °F (36.8 °C), temperature source Oral, resp. rate 16, height 5' 3\" (1.6 m), weight 165 lb (74.8 kg), SpO2 95%.  Respiratory: no labored breathing  CV: RRR  Abdomen: nondistended, soft, nontender, no ecchymoses over the flanks or periumbilical region  Extremities: no calf tenderness  Neurologic: Ox3    Labs:   Lab Results   Component Value Date    WBC 5.1 2025    HGB 10.0 2025    HCT 29.9 2025    .0 2025    CREATSERUM 1.04 2025    BUN 12 2025     2025    K 4.3 2025     2025    CO2 27.0 2025     2025    CA 8.7 2025    ALB 3.5 2025    ALKPHO 85 2025    BILT 0.5 2025    TP 5.8 2025    AST 24 2025    ALT 20 2025    TSH 2.397 2025    MG 1.7 2025       Imaging:  No results found.      Problem list:  Problem List[1]    Assessment/Plan:  Necrotizing pancreatitis in the setting of heavy past alcohol use for 40 years (abstinent for 10 years) and chronic tobacco use.  First admission for this 3 months ago.  Abdominal pain has resolved since admission.  Tolerating solid foods.  Drop in hemoglobin noted yesterday was not substantiated on subsequent blood draws.  Although the CT findings suggest high risk for morbidity and mortality with walled off necrosis and partial occlusion of the splanchnic venous system, the patient is currently asymptomatic with a benign abdominal examination.  Discussed today with patient and family.  I explained  that he will need serial cross-sectional abdominal imaging to follow the progress of his WON but that the information is more meaningful if we wait a week or more between scans.  Cardiology currently evaluating patient for heart block noted while sleeping.    Note: This chart was prepared using voice recognition software and may contain unintended word substitution errors.   Carlo Cox MD FAC  6/7/2025  9:37 AM         [1]   Patient Active Problem List  Diagnosis    Type II diabetes mellitus, uncontrolled    Hypercholesterolemia    Hypertension, benign    Tobacco use disorder    Acute renal failure    Herpes zoster    Pancreatitis (HCC)    Acute pancreatitis, unspecified complication status, unspecified pancreatitis type (HCC)    Acute cholecystitis    Hyponatremia    Thrombocytopenia (HCC)    Hyperglycemia    Acute necrotizing pancreatitis (HCC)

## 2025-06-08 NOTE — CONSULTS
Francisco Del Rio Patient Status:  Inpatient    10/24/1945 MRN [id number]   Location [facility] 5SW/SE Attending Andres Donnelly MD   Hosp Day # 2 PCP IRIS BOLAÑOS MD     Reason for Consultation:  Heart Block    History of Present Illness:  Francisco Del Rio is a a(n) 79 year old male with history of hypertension, hyperlipidemia, diabetes presented to the hospital with abdominal pain.  He has a history of pancreatitis.  States that his been having poor appetite prior to admission due to abdominal pain and nausea.  Cardiology was consulted due to heart block while sleeping.    History:  Past Medical History[1]  Past Surgical History[2]  Family History[3]   reports that he has been smoking cigarettes. He has a 25 pack-year smoking history. He has never used smokeless tobacco. He reports that he does not currently use alcohol after a past usage of about 8.3 standard drinks of alcohol per week. He reports that he does not use drugs.    Allergies:  Allergies[4]    Medications:  Current Hospital Medications[5]    Review of Systems:  A comprehensive review of systems was negative if not otherwise mention in above HPI.    /59 (BP Location: Right arm)   Pulse (!) 49   Temp 99.2 °F (37.3 °C) (Oral)   Resp 18   Ht 5' 3\" (1.6 m)   Wt 165 lb (74.8 kg)   SpO2 96%   BMI 29.23 kg/m²   Temp (24hrs), Av.8 °F (37.1 °C), Min:97.8 °F (36.6 °C), Max:100.1 °F (37.8 °C)       Intake/Output Summary (Last 24 hours) at 2025 0810  Last data filed at 2025 0600  Gross per 24 hour   Intake 2093.75 ml   Output 1600 ml   Net 493.75 ml     Wt Readings from Last 3 Encounters:   25 165 lb (74.8 kg)   25 167 lb (75.8 kg)   25 179 lb 3.2 oz (81.3 kg)       Physical Exam:   General: Alert and oriented x 3. No apparent distress. No respiratory or constitutional distress.  HEENT: Normocephalic, anicteric sclera, neck supple.  Neck: No JVD, carotids 2+, no bruits.  Cardiac: Regular rate and rhythm.  S1, S2 normal. No murmur, pericardial rub, S3.  Lungs: Clear without wheezes, rales, rhonchi or dullness.  Normal excursions and effort.  Abdomen: Soft, non-tender. BS-present.  Extremities: Without clubbing, cyanosis or edema.  Peripheral pulses are 2+.  Neurologic: Alert and oriented, normal affect.  Skin: Warm and dry.     Laboratory Data:  Lab Results   Component Value Date    WBC 5.1 06/08/2025    HGB 10.0 06/08/2025    HCT 29.9 06/08/2025    .0 06/08/2025    CREATSERUM 1.04 06/08/2025    BUN 12 06/08/2025     06/08/2025    K 4.3 06/08/2025     06/08/2025    CO2 27.0 06/08/2025     06/08/2025    CA 8.7 06/08/2025    ALB 3.5 06/08/2025    ALKPHO 85 06/08/2025    BILT 0.5 06/08/2025    TP 5.8 06/08/2025    AST 24 06/08/2025    ALT 20 06/08/2025    TSH 2.397 06/08/2025    MG 1.7 06/08/2025    PGLU 140 06/07/2025         Impression:  Second degree Type I heart block, asymptomatic  Acute necrotizing pancreatitis  Type 2 diabetes  Hypertension  Hyperlipidemia  Chronic kidney disease    Recommendations:  Telemetry was reviewed.  Use heart block occurs while sleeping.  Otherwise asymptomatic.  During waking hours he is in a first-degree heart block  Avoid AV kirstie blocking agents  Outpatient sleep study, monitor  Echocardiogram performed at bedside shows an ejection a preserved ejection fraction.  Final read pending  Will continue to follow    Thank you for allowing me to participate in the care of your patient.    Jacobo Fairchild MD  6/8/2025  8:11 AM       [1]   Past Medical History:   Diabetes (HCC)    Esophageal reflux    Gastritis    High blood pressure    High cholesterol    Other and unspecified hyperlipidemia   [2] History reviewed. No pertinent surgical history.  [3]   Family History  Problem Relation Age of Onset    Lipids Mother    [4] No Known Allergies  [5]   Current Facility-Administered Medications:     magnesium oxide (Mag-Ox) tab 400 mg, 400 mg, Oral, Once    lactated ringers  infusion, , Intravenous, Continuous    pantoprazole (Protonix) DR tab 40 mg, 40 mg, Oral, Daily    atorvastatin (Lipitor) tab 20 mg, 20 mg, Oral, Nightly    acetaminophen (Tylenol Extra Strength) tab 500 mg, 500 mg, Oral, Q4H PRN    morphINE PF 2 MG/ML injection 1 mg, 1 mg, Intravenous, Q2H PRN **OR** morphINE PF 2 MG/ML injection 2 mg, 2 mg, Intravenous, Q2H PRN **OR** morphINE PF 4 MG/ML injection 4 mg, 4 mg, Intravenous, Q2H PRN    ondansetron (Zofran) 4 MG/2ML injection 4 mg, 4 mg, Intravenous, Q6H PRN    metoclopramide (Reglan) 5 mg/mL injection 5 mg, 5 mg, Intravenous, Q8H PRN    glucose (Dex4) 15 GM/59ML oral liquid 15 g, 15 g, Oral, Q15 Min PRN **OR** glucose (Glutose) 40% oral gel 15 g, 15 g, Oral, Q15 Min PRN **OR** glucose-vitamin C (Dex-4) chewable tab 4 tablet, 4 tablet, Oral, Q15 Min PRN **OR** dextrose 50% injection 50 mL, 50 mL, Intravenous, Q15 Min PRN **OR** glucose (Dex4) 15 GM/59ML oral liquid 30 g, 30 g, Oral, Q15 Min PRN **OR** glucose (Glutose) 40% oral gel 30 g, 30 g, Oral, Q15 Min PRN **OR** glucose-vitamin C (Dex-4) chewable tab 8 tablet, 8 tablet, Oral, Q15 Min PRN    insulin aspart (NovoLOG) 100 Units/mL FlexPen 1-5 Units, 1-5 Units, Subcutaneous, TID CC

## 2025-06-08 NOTE — PROGRESS NOTES
[facility]  part of [facility]  Hospitalist Progress Note     Francisco  Quang Patient Status:  Inpatient    10/24/1945  79 year old CSN [id number]   Location 526/526-A Attending Andres Donnelly MD   Hosp Day # 2 PCP IRIS BOLAÑOS MD     Assessment & Plan:   ----------------------------------  Acute necrotizing pancreatitis.  Patient was here in late March with acute pancreatitis.  Workup was not revealing for specific etiology.   Feeling almost back to his baseline  - GI following  - Continue current diet  - cont IVF  - Pain control adequate    Hemoglobin drop.  No physical exam findings to suggest retroperitoneal bleed though he is at risk with his necrotizing pancreatitis.  Baseline hemoglobin is around 13, admitted with 11.6  - this AM 10  - cont to monitor    Bradycardia  - occurred while sleeping, asymptomatic  - noted to have type 2 HB on tele, cards was consulted  - 2d echo pending  - will avoid AV kirstie blocking agents  - cont to monitor     Other problems  Type 2 diabetes  Hypertension  Dyslipidemia  CKD stage II  Gastritis  GERD    Supplementary Documentation:   DVT Mechanical Prophylaxis:   SCDs,    DVT Pharmacologic Prophylaxis   Medication   None                Code Status: Not on file  Skinner: No urinary catheter in place  Skinner Duration (in days):   Central line:    KRISHAN: 2025  MDM :High     Subjective:   ----------------------------------  Pt was seen and examined  Resting comfortably in bed  Abd pain resolved, tolerating diet  Noted to be shira on tele at night, asymptomatic  Denies any dizziness currently       Objective:   Chief Complaint:   Chief Complaint   Patient presents with    Epigastric Pain     ----------------------------------  Temp:  [97.8 °F (36.6 °C)-100.1 °F (37.8 °C)] 98.2 °F (36.8 °C)  Pulse:  [49-66] 53  Resp:  [16-18] 16  BP: (128-134)/(59-88) 131/60  SpO2:  [95 %-98 %] 95 %  Gen: A+Ox3.  No distress.   HEENT: NCAT, neck supple, no carotid bruit.  CV:  RRR, S1S2, and intact distal pulses. No gallop, rub, murmur.  Pulm: Effort and breath sounds normal. No distress, wheezes, rales, rhonchi.  Abd: Soft, NTND, BS normal, no mass, no HSM, no rebound/guarding.  No flank bruising.  Neuro: Normal reflexes, CN. Sensory/motor exams grossly normal deficit.   MS: No joint effusions.  No peripheral edema.  Skin: Skin is warm and dry. No rashes, erythema, diaphoresis.   Psych: Normal mood and affect. Calm, cooperative    Labs:  Lab Results   Component Value Date    HGB 10.0 (L) 06/08/2025    WBC 5.1 06/08/2025    .0 (L) 06/08/2025     06/08/2025    K 4.3 06/08/2025    CREATSERUM 1.04 06/08/2025    INR 0.96 03/16/2025    AST 24 06/08/2025    ALT 20 06/08/2025           Scheduled Medications[1]  PRN Medications[2]             [1]    pantoprazole  40 mg Oral Daily    atorvastatin  20 mg Oral Nightly    insulin aspart  1-5 Units Subcutaneous TID CC   [2]   acetaminophen    morphINE **OR** morphINE **OR** morphINE    ondansetron    metoclopramide    glucose **OR** glucose **OR** glucose-vitamin C **OR** dextrose **OR** glucose **OR** glucose **OR** glucose-vitamin C

## 2025-06-08 NOTE — PLAN OF CARE
Francisco Del Rio Patient Status:  Inpatient    10/24/1945 MRN [id number]   Location [facility] 5SW/SE Attending Aaron Dela Cruz MD   Hosp Day # 2 PCP IRIS BOLAÑOS MD     Cardiology Nocturnal APN Note    Briefly: (Documentation from chart review)     Francisco Del Rio is a 79 yr old M  who presented with epigastric discomfort pmh recent pancreatitis  HTN HL DM2 CKD2 gastritis GERD  Consulted for poss 2nd degree type II av block     Past Medical History[1]    Primary Cardiologist new    Vital Signs:       2025     6:37 PM 2025     8:12 PM   Vitals History   BP  134/63   BP Location  Right arm   Pulse  57   Resp  18   Temp 98.8 °F (37.1 °C) 97.8 °F (36.6 °C)   SpO2  98 %        Labs:   Lab Results   Component Value Date    WBC 5.9 2025    HGB 9.3 2025    HCT 28.0 2025    .0 2025    CREATSERUM 0.60 2025    BUN 9 2025     2025    K 4.1 2025     2025    CO2 18.0 2025    GLU 72 2025    CA 7.4 2025    LIP 36 2025       Diagnostics:   CT ABDOMEN+PELVIS(CONTRAST ONLY)(CPT=74177)  Result Date: 2025  PROCEDURE: CT ABDOMEN + PELVIS (CONTRAST ONLY) (CPT=74177)  COMPARISON: [facility], MRI ABDOMEN AND MRCP W/3D (KWR=[id number]/[id number]), 3/16/2025, 7:45 AM.  [facility], CT ABDOMEN + PELVIS (CONTRAST ONLY) (CPT=74177), 3/16/2025, 0:05 AM.  INDICATIONS: Sent from [facility]: epigastric pain. Declined ambulance  TECHNIQUE: Multidetector CT images of the abdomen and pelvis were obtained with non-ionic intravenous contrast material. Automated exposure control for dose reduction was used. Adjustment of the mA and/or kV was done based on the patient's size. Iterative reconstruction technique for dose reduction was employed.  FINDINGS: LUNG BASES: The heart is normal in size; partially imaged coronary artery calcification. There is dependent subsegmental atelectasis bilaterally. LIVER: No enlargement,  atrophy, abnormal density, or significant focal lesion is identified.  BILIARY: The gallbladder is present. PANCREAS: Interval development of a large multilobulated thick walled peripherally enhancing collection at the pancreatic neck/body that spans approximately 8.6 x 6.5 cm and demonstrates intrinsic foci of gas.  Smaller peripherally enhancing collection with intrinsic gas at the uncinate process of the pancreas spans 4.2 x 2.2 cm and is also new since prior.  There is distal pancreatic atrophy.  Narrowing and probable partial occlusion of the splenic vein as well as superior mesenteric vein and base of the main portal vein adjacent to the aforementioned peripancreatic collections.  No gross pancreatic ductal dilation. SPLEEN: Mildly enlarged 13.7 cm maximal craniocaudal extent spleen. ADRENALS:   No defined mass or abnormal enlargement.  KIDNEYS:   Symmetric enhancement is seen without evidence of hydronephrosis or underlying solid masses.  Simple-appearing left upper pole renal cyst with probable right renal hilar vascular calcification. GI/MESENTERY:  There is no evidence of bowel obstruction.  Mild gastric distention noted; mild stranding in the upper small bowel mesentery may be reactive to aforementioned peripancreatic collections.  Colonic diverticulosis, please note that some segments of the colon are incompletely distended and suboptimally evaluated.  Normal appendix.  URINARY BLADDER: Incompletely distended and suboptimally evaluated. PELVIC NODES: No lymphadenopathy.   PELVIC ORGANS: Enlarged 6.5 cm diameter prostate gland, which indents the bladder base. VASCULATURE:   Stable fusiform 3.2 cm infrarenal abdominal aortic aneurysm with moderate atherosclerosis. RETROPERITONEUM: No mass or lymphadenopathy is apparent.  BONES:   Demineralization with lower lumbar spine degeneration. ABDOMINAL WALL: Small fat containing umbilical hernia, which demonstrates mild intrinsic stranding. OTHER: Trace free fluid  in the pelvis.  No free intraperitoneal air.         CONCLUSION:  1. Interval development of a large multilobulated peripherally enhancing thick walled 8.6 x 6.5 cm collection at the pancreatic neck/body with intrinsic foci of gas since comparison abdominal CT March, 2025. There is a smaller thick walled collection of fluid and gas in the uncinate process that spans 4.2 x 2.2 cm, which is also new since prior.  Findings are concerning for multifocal walled-off pancreatic necrosis secondary to necrotizing pancreatitis.  There is associated narrowing and probable partial occlusion of the splenic vein, upper superior mesenteric vein, and base of the main portal vein; this finding is new since comparison exam.  Gastroenterology evaluation is recommended. 2. Mild peripancreatic and upper mesenteric inflammatory stranding, which is likely reactive to aforementioned subacute necrotizing pancreatitis.  Trace free fluid in the pelvis is also likely reactive. 3. Mild splenomegaly. 4. Stable 3.2 cm fusiform infrarenal abdominal aortic aneurysm.  Coronary and peripheral atherosclerosis. 5. Colonic diverticulosis. 6. Prostatomegaly. Circumferential urinary bladder wall thickening, which may relate to incomplete distention, chronic partial outlet obstruction from prostate gland enlargement, or cystitis.  If there are referable symptoms, urinalysis correlation is requested. 7. Lesser incidental findings as above.     elm-remote  Dictated by (CST): Alexi Cardenas MD on 6/06/2025 at 11:15 AM     Finalized by (CST): Alexi Cardenas MD on 6/06/2025 at 11:27 AM          XR CHEST AP PORTABLE  (CPT=71045)  Result Date: 6/6/2025  PROCEDURE: XR CHEST AP PORTABLE  (CPT=71045) TIME: 931.   COMPARISON: [facility], XR CHEST AP PORTABLE (CPT=71045), 8/25/2023, 3:19 PM.  INDICATIONS: Epigastic pain, nausea, and vomitting.  TECHNIQUE:   Single view.   FINDINGS:  The heart mediastinal structures are minimally enlarged.  Pulmonary  vascularity is upper limits of normal.  Lung volumes are normal with no focal consolidation, effusion, or pneumothorax.  There are moderate degenerative changes within both shoulders.         CONCLUSION: Slight cardiac enlargement with minimal increased prominence of pulmonary vascularity.  However no acute abnormalities identified.    Dictated by (CST): Teofilo Motley MD on 6/06/2025 at 9:49 AM     Finalized by (CST): Teofilo Motley MD on 6/06/2025 at 9:53 AM            Allergies:  Allergies[2]    Medications:  Current Hospital Medications[3]    Assessment:   EKG shows sinus bradycardia no acute changes  Telemetry shows what appears wenckebach  versus type II 2nd degree HB  CxR Slight cardiac enlargement with minimal increased prominence of pulmonary vascularity.  However no acute abnormalities identified.  K  4.1 creat 0.60 pbnp 2990 6/6  trop 6/6 - 4 trop 6/8 - 6   WBC 5.9 HH 7.6/23.2 plts 117 > 10.4/31.2 plt 123   Diuresed 2100 remains +3058 net since admit   Has LR infusing at 75/hr   K 4.2 Mg 1.7 recheck   /63 HR 57 RR 18  O2 sats 98%  BMI 29.35 rhythm occurred during noc          Plan:  Trop pending  Echo in am  Strict I/O   Daily wts  Cbc cmp mag in am   - Continue to monitor overnight  - Formal Cardiology consult to follow in AM.       Carla Raya NP  [facility]  6/8/2025  1:10 AM         [1]   Past Medical History:   Diabetes (HCC)    Esophageal reflux    Gastritis    High blood pressure    High cholesterol    Other and unspecified hyperlipidemia   [2] No Known Allergies  [3]   lactated ringers    pantoprazole    atorvastatin    acetaminophen    morphINE **OR** morphINE **OR** morphINE    ondansetron    metoclopramide    glucose **OR** glucose **OR** glucose-vitamin C **OR** dextrose **OR** glucose **OR** glucose **OR** glucose-vitamin C    insulin aspart

## 2025-06-08 NOTE — PLAN OF CARE
Pt A&Ox4. Tele in place- pt HR dropped to 28 bpm with type 2 heart block- md aware, see orders. Call light within reach  Problem: Patient Centered Care  Goal: Patient preferences are identified and integrated in the patient's plan of care  Description: Interventions:  - What would you like us to know as we care for you?   - Provide timely, complete, and accurate information to patient/family  - Incorporate patient and family knowledge, values, beliefs, and cultural backgrounds into the planning and delivery of care  - Encourage patient/family to participate in care and decision-making at the level they choose  - Honor patient and family perspectives and choices  Outcome: Progressing     Problem: Diabetes/Glucose Control  Goal: Glucose maintained within prescribed range  Description: INTERVENTIONS:  - Monitor Blood Glucose as ordered  - Assess for signs and symptoms of hyperglycemia and hypoglycemia  - Administer ordered medications to maintain glucose within target range  - Assess barriers to adequate nutritional intake and initiate nutrition consult as needed  - Instruct patient on self management of diabetes  Outcome: Progressing     Problem: Patient/Family Goals  Goal: Patient/Family Long Term Goal  Description: Patient's Long Term Goal:     Interventions:  -   - See additional Care Plan goals for specific interventions  Outcome: Progressing  Goal: Patient/Family Short Term Goal  Description: Patient's Short Term Goal:     Interventions:   -   - See additional Care Plan goals for specific interventions  Outcome: Progressing     Problem: GASTROINTESTINAL - ADULT  Goal: Minimal or absence of nausea and vomiting  Description: INTERVENTIONS:  - Maintain adequate hydration with IV or PO as ordered and tolerated  - Nasogastric tube to low intermittent suction as ordered  - Evaluate effectiveness of ordered antiemetic medications  - Provide nonpharmacologic comfort measures as appropriate  - Advance diet as tolerated,  if ordered  - Obtain nutritional consult as needed  - Evaluate fluid balance  Outcome: Progressing  Goal: Maintains or returns to baseline bowel function  Description: INTERVENTIONS:  - Assess bowel function  - Maintain adequate hydration with IV or PO as ordered and tolerated  - Evaluate effectiveness of GI medications  - Encourage mobilization and activity  - Obtain nutritional consult as needed  - Establish a toileting routine/schedule  - Consider collaborating with pharmacy to review patient's medication profile  Outcome: Progressing     Problem: METABOLIC/FLUID AND ELECTROLYTES - ADULT  Goal: Glucose maintained within prescribed range  Description: INTERVENTIONS:  - Monitor Blood Glucose as ordered  - Assess for signs and symptoms of hyperglycemia and hypoglycemia  - Administer ordered medications to maintain glucose within target range  - Assess barriers to adequate nutritional intake and initiate nutrition consult as needed  - Instruct patient on self management of diabetes  Outcome: Progressing     Problem: PAIN - ADULT  Goal: Verbalizes/displays adequate comfort level or patient's stated pain goal  Description: INTERVENTIONS:  - Encourage pt to monitor pain and request assistance  - Assess pain using appropriate pain scale  - Administer analgesics based on type and severity of pain and evaluate response  - Implement non-pharmacological measures as appropriate and evaluate response  - Consider cultural and social influences on pain and pain management  - Manage/alleviate anxiety  - Utilize distraction and/or relaxation techniques  - Monitor for opioid side effects  - Notify MD/LIP if interventions unsuccessful or patient reports new pain  - Anticipate increased pain with activity and pre-medicate as appropriate  Outcome: Progressing     Problem: SAFETY ADULT - FALL  Goal: Free from fall injury  Description: INTERVENTIONS:  - Assess pt frequently for physical needs  - Identify cognitive and physical deficits  and behaviors that affect risk of falls.  - [place] fall precautions as indicated by assessment.  - Educate pt/family on patient safety including physical limitations  - Instruct pt to call for assistance with activity based on assessment  - Modify environment to reduce risk of injury  - Provide assistive devices as appropriate  - Consider OT/PT consult to assist with strengthening/mobility  - Encourage toileting schedule  Outcome: Progressing

## 2025-06-09 VITALS
HEART RATE: 55 BPM | SYSTOLIC BLOOD PRESSURE: 133 MMHG | RESPIRATION RATE: 16 BRPM | OXYGEN SATURATION: 99 % | HEIGHT: 63 IN | BODY MASS INDEX: 29.23 KG/M2 | TEMPERATURE: 98 F | WEIGHT: 165 LBS | DIASTOLIC BLOOD PRESSURE: 57 MMHG

## 2025-06-09 LAB
ALBUMIN SERPL-MCNC: 3.7 G/DL (ref 3.2–4.8)
ALBUMIN/GLOB SERPL: 1.5 {RATIO} (ref 1–2)
ALP LIVER SERPL-CCNC: 90 U/L (ref 45–117)
ALT SERPL-CCNC: 23 U/L (ref 10–49)
ANION GAP SERPL CALC-SCNC: 9 MMOL/L (ref 0–18)
AST SERPL-CCNC: 21 U/L (ref ?–34)
ATRIAL RATE: 50 BPM
ATRIAL RATE: 54 BPM
BASOPHILS # BLD AUTO: 0.06 X10(3) UL (ref 0–0.2)
BASOPHILS NFR BLD AUTO: 1.2 %
BILIRUB SERPL-MCNC: 0.5 MG/DL (ref 0.2–1.1)
BUN BLD-MCNC: 12 MG/DL (ref 9–23)
BUN/CREAT SERPL: 11.4 (ref 10–20)
CALCIUM BLD-MCNC: 9.1 MG/DL (ref 8.7–10.4)
CHLORIDE SERPL-SCNC: 104 MMOL/L (ref 98–112)
CO2 SERPL-SCNC: 27 MMOL/L (ref 21–32)
CREAT BLD-MCNC: 1.05 MG/DL (ref 0.7–1.3)
DEPRECATED RDW RBC AUTO: 41.1 FL (ref 35.1–46.3)
EGFRCR SERPLBLD CKD-EPI 2021: 72 ML/MIN/1.73M2 (ref 60–?)
EOSINOPHIL # BLD AUTO: 0.12 X10(3) UL (ref 0–0.7)
EOSINOPHIL NFR BLD AUTO: 2.4 %
ERYTHROCYTE [DISTWIDTH] IN BLOOD BY AUTOMATED COUNT: 12.9 % (ref 11–15)
GLOBULIN PLAS-MCNC: 2.5 G/DL (ref 2–3.5)
GLUCOSE BLD-MCNC: 121 MG/DL (ref 70–99)
GLUCOSE BLDC GLUCOMTR-MCNC: 121 MG/DL (ref 70–99)
GLUCOSE BLDC GLUCOMTR-MCNC: 133 MG/DL (ref 70–99)
HCT VFR BLD AUTO: 33.3 % (ref 39–53)
HGB BLD-MCNC: 10.6 G/DL (ref 13–17.5)
IMM GRANULOCYTES # BLD AUTO: 0.25 X10(3) UL (ref 0–1)
IMM GRANULOCYTES NFR BLD: 5 %
LYMPHOCYTES # BLD AUTO: 1.38 X10(3) UL (ref 1–4)
LYMPHOCYTES NFR BLD AUTO: 27.7 %
MAGNESIUM SERPL-MCNC: 1.6 MG/DL (ref 1.6–2.6)
MCH RBC QN AUTO: 27.7 PG (ref 26–34)
MCHC RBC AUTO-ENTMCNC: 31.8 G/DL (ref 31–37)
MCV RBC AUTO: 86.9 FL (ref 80–100)
MONOCYTES # BLD AUTO: 0.48 X10(3) UL (ref 0.1–1)
MONOCYTES NFR BLD AUTO: 9.6 %
NEUTROPHILS # BLD AUTO: 2.7 X10 (3) UL (ref 1.5–7.7)
NEUTROPHILS # BLD AUTO: 2.7 X10(3) UL (ref 1.5–7.7)
NEUTROPHILS NFR BLD AUTO: 54.1 %
OSMOLALITY SERPL CALC.SUM OF ELEC: 291 MOSM/KG (ref 275–295)
P AXIS: 34 DEGREES
P AXIS: 42 DEGREES
P-R INTERVAL: 242 MS
P-R INTERVAL: 334 MS
PLATELET # BLD AUTO: 153 10(3)UL (ref 150–450)
POTASSIUM SERPL-SCNC: 4.4 MMOL/L (ref 3.5–5.1)
PROT SERPL-MCNC: 6.2 G/DL (ref 5.7–8.2)
Q-T INTERVAL: 472 MS
Q-T INTERVAL: 478 MS
QRS DURATION: 88 MS
QRS DURATION: 90 MS
QTC CALCULATION (BEZET): 435 MS
QTC CALCULATION (BEZET): 447 MS
R AXIS: -33 DEGREES
R AXIS: -34 DEGREES
RBC # BLD AUTO: 3.83 X10(6)UL (ref 3.8–5.8)
SODIUM SERPL-SCNC: 140 MMOL/L (ref 136–145)
T AXIS: 24 DEGREES
T AXIS: 37 DEGREES
VENTRICULAR RATE: 50 BPM
VENTRICULAR RATE: 54 BPM
WBC # BLD AUTO: 5 X10(3) UL (ref 4–11)

## 2025-06-09 PROCEDURE — 99239 HOSP IP/OBS DSCHRG MGMT >30: CPT | Performed by: HOSPITALIST

## 2025-06-09 RX ORDER — MAGNESIUM OXIDE 400 MG/1
400 TABLET ORAL ONCE
Status: COMPLETED | OUTPATIENT
Start: 2025-06-09 | End: 2025-06-09

## 2025-06-09 NOTE — CM/SW NOTE
06/09/25 1300   Discharge disposition   Expected discharge disposition Home-Health   Post Acute Care Provider   ([facility])   Discharge transportation Private car     QUIN confirmed with RN Maame who stated pt is medically ready for discharge today. DC order placed.    HH updates attached via [facility] to [facility].  Doc from [facility]  made aware of discharge through [facility] Messages.    QUIN confirmed that [facility] contact information added to AVS.    PLAN: DC home with [facility]    Gwendolyn PERES, LSW  Discharge Planner

## 2025-06-09 NOTE — PROGRESS NOTES
Progress Note  Francisco Del Rio Patient Status:  Inpatient    10/24/1945 MRN [id number]   Location [facility] 5SW/SE Attending Andres Donnelly MD   Hosp Day # 3 PCP IRIS BOLAÑOS MD     SUBJECTIVE:    No chest pain, dyspnea, or weakness/lightheadedness. Hoping that he will be discharged today.     VITALS:  /57 (BP Location: Right arm)   Pulse 55   Temp 97.9 °F (36.6 °C) (Oral)   Resp 16   Ht 5' 3\" (1.6 m)   Wt 165 lb (74.8 kg)   SpO2 99%   BMI 29.23 kg/m²   INTAKE/OUTPUT:    Intake/Output Summary (Last 24 hours) at 2025 0957  Last data filed at 2025 0858  Gross per 24 hour   Intake 2145 ml   Output 2725 ml   Net -580 ml     Last 3 Weights   25 0535 165 lb (74.8 kg)   25 0437 165 lb (74.8 kg)   25 1308 165 lb 11.2 oz (75.2 kg)   25 1303 165 lb 11.2 oz (75.2 kg)   25 0856 167 lb (75.8 kg)   25 0821 167 lb (75.8 kg)   25 0407 179 lb 3.2 oz (81.3 kg)     LABS:  Recent Labs   Lab 25  0127 25  0548 25  0753   * 123* 121*   BUN 12 12 12   CREATSERUM 1.04 1.04 1.05   EGFRCR 73 73 72   CA 9.1 8.7 9.1    139 140   K 4.2  4.2 4.3 4.4    106 104   CO2 26.0 27.0 27.0     Recent Labs   Lab 25  0127 25  0755 25  0753   RBC 3.64* 3.57* 3.83   HGB 10.4* 10.0* 10.6*   HCT 31.2* 29.9* 33.3*   MCV 85.7 83.8 86.9   MCH 28.6 28.0 27.7   MCHC 33.3 33.4 31.8   RDW 12.9 13.1 12.9   NEPRELIM 3.60 2.97 2.70   WBC 6.5 5.1 5.0   .0* 133.0* 153.0     No results for input(s): \"TROP\", \"CK\" in the last 168 hours.  DIAGNOSTICS:  TELEMETRY: SB/SR 1st degree av block, overnight, Mobitz Type I    ECHO 2025:  Conclusions:   1. Left ventricle: The cavity size was normal. Wall thickness was normal.      Systolic function was normal. The estimated ejection fraction was 60-65%,      by visual assessment. No diagnostic evidence for regional wall motion      abnormalities. Left ventricular diastolic function  parameters were normal      for the patient's age.   2. Right ventricle: Systolic pressure was at the upper limits of normal.   3. Left atrium: The left atrial volume was mildly increased.   4. Aortic valve: The annulus was mildly calcified. The valve was trileaflet.      The leaflets were mildly calcified.   5. Pulmonary arteries: Systolic pressure was at the upper limits of normal,      estimated to be 36mm Hg.   Impressions:  No previous study was available for comparison.     ROS: Negative unless noted above   PHYSICAL EXAM:  General: Alert and oriented x 3. No apparent distress.  HEENT: Normocephalic, sclera are nonicteric. Hearing appropriate bilaterally.  Neck: No JVD or Carotid bruits. Trachea midline.   Cardiac: Regular rate and rhythm. S1, S2 auscultated. No murmurs, rubs, or gallops appreciated.   Lungs: Clear without wheezes, rales, rhonchi or dullness. Chest expansion symmetrical. Regular effort.  Abdomen: Soft, non-tender, +BS. No hepatosplenomegaly or appreciable masses.   Extremities: Without clubbing, cyanosis. Peripheral pulses are 2+. Edema none  Neurologic: Motor and sensory nerves grossly intact.   Psych: Appropriate affect   Skin: Warm and dry. No obvious lesions, wounds, or ulcerations.     MEDICATIONS:  Scheduled Medications[1]  Medication Infusions[2]    ASSESSMENT:    Patient here with acute necrotizing pancreatitis. Cardiology was consulted for Second Degree Type I Heart block.     Second Degree Type I Heart Block   - TSH normal   - Occurs during sleep  - ECHO with preserved ejection fraction and no WMA   - Not on AV Cherelle Blockers  - Mag mildly low, being replaced    Necrotizing Pancreatitis   Hx Heavy/ Past ETOH Use   - Per GI and Primary     HLD- Last LDL 2016, Lipitor 20 mg, elevated liver enzymes in March  Type II DM- A1c 6.2%     PLAN:  - With stable ECHO and intermittent, Mobitz Type I overnight will recommend o/p sleep study with [facility], discussed with patient and he is agreeable,  otherwise continue to keep off AV kirstie blockers  - Hold Statin, repeat Lipid panel and LFTs at o/p visit in 1-2 weeks   - No further cardiac workup, we will follow peripherally     Plan of care discussed with patient and RN.     Ruba Cooney, MSN, FNP-BC, CCK  06/09/25   9:57 AM           [1]    pantoprazole  40 mg Oral Daily    atorvastatin  20 mg Oral Nightly    insulin aspart  1-5 Units Subcutaneous TID CC   [2]    lactated ringers 75 mL/hr at 06/09/25 0537

## 2025-06-09 NOTE — PLAN OF CARE
Heart rate decreased while sleeping, Cardiology aware, EKG completed per orders. Fall precautions in place. Call light in reach.     Problem: Patient Centered Care  Goal: Patient preferences are identified and integrated in the patient's plan of care  Description: Interventions:  - What would you like us to know as we care for you? None stated  - Provide timely, complete, and accurate information to patient/family  - Incorporate patient and family knowledge, values, beliefs, and cultural backgrounds into the planning and delivery of care  - Encourage patient/family to participate in care and decision-making at the level they choose  - Honor patient and family perspectives and choices  Outcome: Progressing     Problem: Diabetes/Glucose Control  Goal: Glucose maintained within prescribed range  Description: INTERVENTIONS:  - Monitor Blood Glucose as ordered  - Assess for signs and symptoms of hyperglycemia and hypoglycemia  - Administer ordered medications to maintain glucose within target range  - Assess barriers to adequate nutritional intake and initiate nutrition consult as needed  - Instruct patient on self management of diabetes  Outcome: Progressing     Problem: Patient/Family Goals  Goal: Patient/Family Long Term Goal  Description: Patient's Long Term Goal: Free of falls    Interventions:  - fall precautions in place  - See additional Care Plan goals for specific interventions  Outcome: Progressing  Goal: Patient/Family Short Term Goal  Description: Patient's Short Term Goal: free of pain    Interventions:   - monitor pain  - See additional Care Plan goals for specific interventions  Outcome: Progressing     Problem: GASTROINTESTINAL - ADULT  Goal: Minimal or absence of nausea and vomiting  Description: INTERVENTIONS:  - Maintain adequate hydration with IV or PO as ordered and tolerated  - Nasogastric tube to low intermittent suction as ordered  - Evaluate effectiveness of ordered antiemetic medications  -  Provide nonpharmacologic comfort measures as appropriate  - Advance diet as tolerated, if ordered  - Obtain nutritional consult as needed  - Evaluate fluid balance  Outcome: Progressing  Goal: Maintains or returns to baseline bowel function  Description: INTERVENTIONS:  - Assess bowel function  - Maintain adequate hydration with IV or PO as ordered and tolerated  - Evaluate effectiveness of GI medications  - Encourage mobilization and activity  - Obtain nutritional consult as needed  - Establish a toileting routine/schedule  - Consider collaborating with pharmacy to review patient's medication profile  Outcome: Progressing     Problem: METABOLIC/FLUID AND ELECTROLYTES - ADULT  Goal: Glucose maintained within prescribed range  Description: INTERVENTIONS:  - Monitor Blood Glucose as ordered  - Assess for signs and symptoms of hyperglycemia and hypoglycemia  - Administer ordered medications to maintain glucose within target range  - Assess barriers to adequate nutritional intake and initiate nutrition consult as needed  - Instruct patient on self management of diabetes  Outcome: Progressing     Problem: PAIN - ADULT  Goal: Verbalizes/displays adequate comfort level or patient's stated pain goal  Description: INTERVENTIONS:  - Encourage pt to monitor pain and request assistance  - Assess pain using appropriate pain scale  - Administer analgesics based on type and severity of pain and evaluate response  - Implement non-pharmacological measures as appropriate and evaluate response  - Consider cultural and social influences on pain and pain management  - Manage/alleviate anxiety  - Utilize distraction and/or relaxation techniques  - Monitor for opioid side effects  - Notify MD/LIP if interventions unsuccessful or patient reports new pain  - Anticipate increased pain with activity and pre-medicate as appropriate  Outcome: Progressing     Problem: SAFETY ADULT - FALL  Goal: Free from fall injury  Description:  INTERVENTIONS:  - Assess pt frequently for physical needs  - Identify cognitive and physical deficits and behaviors that affect risk of falls.  - [place] fall precautions as indicated by assessment.  - Educate pt/family on patient safety including physical limitations  - Instruct pt to call for assistance with activity based on assessment  - Modify environment to reduce risk of injury  - Provide assistive devices as appropriate  - Consider OT/PT consult to assist with strengthening/mobility  - Encourage toileting schedule  Outcome: Progressing

## 2025-06-09 NOTE — PROGRESS NOTES
GASTROENTEROLOGY PROGRESS NOTE      Subjective: Patient without complaints, denies abdominal pain.    PE:  Vitals:   BP Readings from Last 3 Encounters:   06/09/25 133/57   06/06/25 112/64   03/21/25 134/63      Wt Readings from Last 3 Encounters:   06/09/25 165 lb (74.8 kg)   06/06/25 167 lb (75.8 kg)   03/16/25 179 lb 3.2 oz (81.3 kg)      General: AAOx3 in NAD  HEENT: No scleral icterus, no LAD  Abdomen: normal active bowel sounds, soft, nontender, mildly distended with small umbilical hernia, no stigmata of liver disease    Labs: Reviewed in chart   Latest Reference Range & Units 06/09/25 07:53   WBC 4.0 - 11.0 x10(3) uL 5.0   Hemoglobin 13.0 - 17.5 g/dL 10.6 (L)   Hematocrit 39.0 - 53.0 % 33.3 (L)   Platelet Count 150.0 - 450.0 10(3)uL 153.0      Latest Reference Range & Units 06/09/25 07:53   ALKALINE PHOSPHATASE 45 - 117 U/L 90   AST (SGOT) <34 U/L 21   ALT (SGPT) 10 - 49 U/L 23   Total Bilirubin 0.2 - 1.1 mg/dL 0.5     Imaging:  3/16/25 CT A/P:  CONCLUSION:   1. Moderate to severe acute interstitial pancreatitis.  No discrete peripancreatic fluid collection is identified.   2. Gallbladder wall thickened may be reactive to pancreatitis.  No radiopaque gallstone is seen.  This is evaluated to advantage on subsequent right upper quadrant ultrasound and MRI/MRCP.   3. Questionable filling defect in the portal vein likely relates to venous admixture.   4. Pulmonary edema.   5. Infrarenal abdominal aortic aneurysm at penetrating ulcer measuring 3.1 cm.   6. Prostatomegaly.   7. Colonic diverticulosis. Additional chronic or incidental findings are described in the body of this report.     3/16/25  MRI Abd:  1. No gallstones.  No choledocholithiasis.  Gallbladder wall thickening and edema is probably reactive to adjacent pancreatitis, but acute cholecystitis is not excluded.   2. Severe acute interstitial pancreatitis.  No discrete peripancreatic fluid collections are seen.  Due to lack of intravenous contrast, the  pancreas is not well evaluated for necrosis.   3. Small right pleural effusion.     6/6/25 CT A/P:  CONCLUSION:   1. Interval development of a large multilobulated peripherally enhancing thick walled 8.6 x 6.5 cm collection at the pancreatic neck/body with intrinsic foci of gas since comparison abdominal CT March, 2025. There is a smaller thick walled collection of fluid and gas in the uncinate process that spans 4.2 x 2.2 cm, which is also new since prior.  Findings are concerning for multifocal walled-off pancreatic necrosis secondary to necrotizing pancreatitis.  There is associated narrowing and probable partial occlusion of the splenic vein, upper superior mesenteric vein, and base of the main portal vein; this finding is new since comparison exam.  Gastroenterology evaluation is recommended.   2. Mild peripancreatic and upper mesenteric inflammatory stranding, which is likely reactive to aforementioned subacute necrotizing pancreatitis.  Trace free fluid in the pelvis is also likely reactive.   3. Mild splenomegaly.   4. Stable 3.2 cm fusiform infrarenal abdominal aortic aneurysm.  Coronary and peripheral atherosclerosis.   5. Colonic diverticulosis.   6. Prostatomegaly. Circumferential urinary bladder wall thickening, which may relate to incomplete distention, chronic partial outlet obstruction from prostate gland enlargement, or cystitis.  If there are referable symptoms, urinalysis correlation is requested.   7. Lesser incidental findings as above.           Assessment and Plan: Patient is an 79-year-old male with past medical history of alcohol abuse admitted on June 6, 2025 with necrotizing pancreatitis.  Repeat CT scan with interval fluid collection consistent with walled off necrotizing pancreatitis, no clinical signs of infection.  Patient planning for discharge today, will need follow-up as an outpatient with Dr. Schneider.

## 2025-06-09 NOTE — DISCHARGE SUMMARY
[facility]  part of [facility]    Discharge Summary    Francisco Del Rio Patient Status:  Inpatient    10/24/1945 MRN [id number]   Location [facility] 5SW/SE Attending Andres Donnelly MD   Hosp Day # 3 PCP IRIS BOLAÑOS MD     Date of Admission: 2025 Disposition: Home Health Care Services     Date of Discharge: 25    Admitting Diagnosis: Acute necrotizing pancreatitis (HCC) [K85.91]    Hospital Discharge Diagnoses: acute necrotizing pancreatitis, Second degree type 1 heart block    Lace+ Score: 63  59-90 High Risk  29-58 Medium Risk  0-28   Low Risk.    TCM Follow-Up Recommendation:  LACE > 58: High Risk of readmission after discharge from the hospital.    Problem List: Problem List[1]    Reason for Admission: abd pain    Physical Exam:   Vitals:    25 0855   BP: 133/57   Pulse: 55   Resp: 16   Temp: 97.9 °F (36.6 °C)   Gen: A+Ox3.  No distress.   HEENT: NCAT, neck supple, no carotid bruit.  CV: RRR, S1S2, and intact distal pulses. No gallop, rub, murmur.  Pulm: Effort and breath sounds normal. No distress, wheezes, rales, rhonchi.  Abd: Soft, NTND, BS normal, no mass, no HSM, no rebound/guarding.  No flank bruising.  Neuro:  Normal reflexes, CN. Sensory/motor exams grossly normal deficit.   MS: No joint effusions.  No peripheral edema.  Skin: Skin is warm and dry. No rashes, erythema, diaphoresis.   Psych:   Normal mood and affect. Calm, cooperative      History of Present Illness:   Per Dr. Fleming  Patient is an 79-year-old  male who was hospitalized in 2025 with a similar but less intense incidence of acute pancreatitis. At that time, no etiology could be found. He had a negative HIDA scan, normal liver function tests, and his pancreatitis was considered idiopathic. He followed up with Gastroenterology as an outpatient, and he was supposed to have an MRCP with and without contrast and the plan was to wait until his pancreatitis resolved completely  and the inflammation was gone. He told his family that he has been having midepigastric abdominal pain associated with nausea and poor appetite for the last 5 days. He informed his family about his symptoms today. They brought him into the emergency department for evaluation. CBC showed hemoglobin of 11.6, no leukocytosis or left shift. Hemoglobin has been 11.6 which has dropped from 12.4 from March 2025. Chemistry showed GFR of 50 which is slightly below his baseline. ProBNP 2900 but patient appeared clinically dehydrated. Urinalysis showed no evidence of urinary tract infection. Lipase minimally elevated at 69, glucose 170, and sodium 135. Chest x-ray showed no acute findings. CT scan of the abdomen showed interval development of large, multilobulated, peripherally enhancing, thick-walled, 8.6 x 6.5 cm collection at the pancreatic neck and body with intrinsic foci of gas in comparison of March 2025. There is a smaller thick-walled collection of fluid and gas in the uncinate process that spans 4.2 x 2.2 cm which is also new. Findings concerning for multifocal walled-off pancreatic necrosis secondary to necrotizing pancreatitis. There is associated narrowing and probable partial occlusion of the splenic vein, upper superior mesenteric vein, and base of the main portal vein, findings are also new. Mild peripancreatic and upper mesenteric inflammatory stranding which is likely reactive to the aforementioned subacute necrotizing pancreatitis. Trace free fluid in the pelvis, also likely reactive. No other acute findings on CT scan. Patient will be admitted to the hospital for further management.      Hospital Course:   Acute necrotizing pancreatitis.  Patient was here in late March with acute pancreatitis.  Workup was not revealing for specific etiology.   Feeling back to his baseline   - GI was following  - Continue current diet  - rec'd IVF     Hemoglobin drop.  No physical exam findings to suggest retroperitoneal  bleed though he is at risk with his necrotizing pancreatitis.  Baseline hemoglobin is around 13, admitted with 11.6  - this AM 10.6  - stable     Second Degree Type 1 heart block  - intermittent, overnight   - cards was consulted  - 2d echo reviewed  - will avoid AV kirstie blocking agents  - cont to monitor      Other problems  Type 2 diabetes  Hypertension  Dyslipidemia  CKD stage II  Gastritis  GERD    Consultations: Cardiology, GI    Procedures: none    Complications: none    Discharge Condition: Stable    Discharge Medications:      Discharge Medications        CHANGE how you take these medications        Instructions Prescription details   metFORMIN 500 MG Tabs  Commonly known as: Glucophage  What changed:   how much to take  how to take this  when to take this  additional instructions      1/2 tab PO q12 hrs. Pre meals.   Quantity: 90 tablet  Refills: 1            CONTINUE taking these medications        Instructions Prescription details   aspirin 81 MG Tabs      Take 1 tablet by mouth daily.   Quantity: 90 tablet  Refills: 1     FreeStyle Lancets Misc      as directed   Refills: 0     FreeStyle Lite Suzanne      as directed   Refills: 0     Glucose Blood Strp  Commonly known as: FreeStyle Lite Test      Dx. 250.02. take by Misc.(Non-Drug; Combo Route) route twice a day   Quantity: 100 strip  Refills: 6     lisinopril 10 MG Tabs  Commonly known as: Zestril      Take 0.5 tablets (5 mg total) by mouth daily.   Quantity: 90 tablet  Refills: 1     pantoprazole 40 MG Tbec  Commonly known as: Protonix      Take 1 tablet (40 mg total) by mouth once daily.   Quantity: 90 tablet  Refills: 1            STOP taking these medications      simvastatin 40 MG Tabs  Commonly known as: Zocor                 Greater than 35 minutes spent, >50% spent counseling re: treatment plan and workup     Andres Donnelly MD  6/9/2025           [1]   Patient Active Problem List  Diagnosis    Type II diabetes mellitus, uncontrolled     Hypercholesterolemia    Hypertension, benign    Tobacco use disorder    Acute renal failure    Herpes zoster    Pancreatitis (HCC)    Acute pancreatitis, unspecified complication status, unspecified pancreatitis type (HCC)    Acute cholecystitis    Hyponatremia    Thrombocytopenia (HCC)    Hyperglycemia    Acute necrotizing pancreatitis (HCC)

## 2025-06-09 NOTE — PROGRESS NOTES
Patient was provided with discharge instructions, education, and follow up information. Patient's son present for discharge instructions with patient's consent.  Patient verbalizes understanding of follow up information, specifically stopping simvastatin. Patient has no questions after reviewing all instructions and will be going home. Belongings taken with patient. Patient transported to exit in wheelchair by PCT.

## 2025-06-09 NOTE — PLAN OF CARE
Problem: Patient Centered Care  Goal: Patient preferences are identified and integrated in the patient's plan of care  Description: Interventions:  - What would you like us to know as we care for you? I live at home.  - Provide timely, complete, and accurate information to patient/family  - Incorporate patient and family knowledge, values, beliefs, and cultural backgrounds into the planning and delivery of care  - Encourage patient/family to participate in care and decision-making at the level they choose  - Honor patient and family perspectives and choices  Outcome: Progressing     Problem: Diabetes/Glucose Control  Goal: Glucose maintained within prescribed range  Description: INTERVENTIONS:  - Monitor Blood Glucose as ordered  - Assess for signs and symptoms of hyperglycemia and hypoglycemia  - Administer ordered medications to maintain glucose within target range  - Assess barriers to adequate nutritional intake and initiate nutrition consult as needed  - Instruct patient on self management of diabetes  Outcome: Progressing     Problem: Patient/Family Goals  Goal: Patient/Family Long Term Goal  Description: Patient's Long Term Goal: To go home    Interventions:  - Monitor labs and vital signs  - Telemetry monitoring  - Follow provider recommendations  - See additional Care Plan goals for specific interventions  Outcome: Progressing  Goal: Patient/Family Short Term Goal  Description: Patient's Short Term Goal: To keep my HR up    Interventions:   - Telemetry monitoring  - Cardiology consult  - Follow provider recommendations  - See additional Care Plan goals for specific interventions  Outcome: Progressing     Problem: GASTROINTESTINAL - ADULT  Goal: Minimal or absence of nausea and vomiting  Description: INTERVENTIONS:  - Maintain adequate hydration with IV or PO as ordered and tolerated  - Nasogastric tube to low intermittent suction as ordered  - Evaluate effectiveness of ordered antiemetic medications  -  Provide nonpharmacologic comfort measures as appropriate  - Advance diet as tolerated, if ordered  - Obtain nutritional consult as needed  - Evaluate fluid balance  Outcome: Progressing  Goal: Maintains or returns to baseline bowel function  Description: INTERVENTIONS:  - Assess bowel function  - Maintain adequate hydration with IV or PO as ordered and tolerated  - Evaluate effectiveness of GI medications  - Encourage mobilization and activity  - Obtain nutritional consult as needed  - Establish a toileting routine/schedule  - Consider collaborating with pharmacy to review patient's medication profile  Outcome: Progressing     Problem: METABOLIC/FLUID AND ELECTROLYTES - ADULT  Goal: Glucose maintained within prescribed range  Description: INTERVENTIONS:  - Monitor Blood Glucose as ordered  - Assess for signs and symptoms of hyperglycemia and hypoglycemia  - Administer ordered medications to maintain glucose within target range  - Assess barriers to adequate nutritional intake and initiate nutrition consult as needed  - Instruct patient on self management of diabetes  Outcome: Progressing     Problem: PAIN - ADULT  Goal: Verbalizes/displays adequate comfort level or patient's stated pain goal  Description: INTERVENTIONS:  - Encourage pt to monitor pain and request assistance  - Assess pain using appropriate pain scale  - Administer analgesics based on type and severity of pain and evaluate response  - Implement non-pharmacological measures as appropriate and evaluate response  - Consider cultural and social influences on pain and pain management  - Manage/alleviate anxiety  - Utilize distraction and/or relaxation techniques  - Monitor for opioid side effects  - Notify MD/LIP if interventions unsuccessful or patient reports new pain  - Anticipate increased pain with activity and pre-medicate as appropriate  Outcome: Progressing     Problem: SAFETY ADULT - FALL  Goal: Free from fall injury  Description:  INTERVENTIONS:  - Assess pt frequently for physical needs  - Identify cognitive and physical deficits and behaviors that affect risk of falls.  - [place] fall precautions as indicated by assessment.  - Educate pt/family on patient safety including physical limitations  - Instruct pt to call for assistance with activity based on assessment  - Modify environment to reduce risk of injury  - Provide assistive devices as appropriate  - Consider OT/PT consult to assist with strengthening/mobility  - Encourage toileting schedule  Outcome: Progressing

## 2025-06-09 NOTE — PLAN OF CARE
Notified of HR 40's & poss type II avb  bp 131/61 O2 sat 97% HR 47 sleeping tele in media pending EKG  shows s. Garret rate 50 first AVB patient asymptomatic sleeping poss SAMANTA

## 2025-06-10 NOTE — PAYOR COMM NOTE
--------------  ADMISSION REVIEW     Payor: [employer]  Subscriber #:  [id number]  Authorization Number: [id number]    Admit date: 6/6/25  Admit time: 12:57 PM       ED Provider Notes        HPI  Patient presents the emergency department with 5 days of epigastric discomfort.  Patient has nausea with 1 episode of vomiting this morning.  Went to a walk-in clinic and was redirected here.  He has a previous history of pancreatitis several months ago requiring hospitalization.    ED Triage Vitals [06/06/25 0856]   /63   Pulse 70   Resp 18   Temp 98 °F (36.7 °C)   Temp src    SpO2 98 %   O2 Device      HENT:      Head: Normocephalic.      Nose: Nose normal.      Mouth/Throat:      Mouth: Mucous membranes are moist.   Eyes:      Conjunctiva/sclera: Conjunctivae normal.   Cardiovascular:      Rate and Rhythm: Normal rate and regular rhythm.      Heart sounds: No murmur heard.  Pulmonary:      Effort: Pulmonary effort is normal. No respiratory distress.      Breath sounds: Normal breath sounds.   Abdominal:      General: There is no distension.      Palpations: Abdomen is soft.      Tenderness: There is abdominal tenderness in the epigastric area. There is no guarding or rebound.   Musculoskeletal:         General: No tenderness. Normal range of motion.      Cervical back: Normal range of motion and neck supple.   Skin:     General: Skin is warm and dry.      Findings: No rash.   Neurological:      Mental Status: He is alert and oriented to person, place, and time.     Labs Reviewed   COMP METABOLIC PANEL (14) - Abnormal; Notable for the following components:       Result Value    Glucose 170 (*)     Sodium 135 (*)     Creatinine 1.43 (*)     eGFR-Cr 50 (*)     All other components within normal limits   CBC WITH DIFFERENTIAL WITH PLATELET - Abnormal; Notable for the following components:    HGB 11.6 (*)     HCT 35.5 (*)     .0 (*)     Lymphocyte Absolute 0.81 (*)     All other components within  normal limits   LIPASE - Abnormal; Notable for the following components:    Lipase 69 (*)     All other components within normal limits   URINALYSIS WITH CULTURE REFLEX - Abnormal; Notable for the following components:    Blood Urine 2+ (*)     Protein Urine Trace (*)     RBC Urine >10 (*)     Hyaline Casts Present (*)     All other components within normal limits   PRO BETA NATRIURETIC PEPTIDE - Abnormal; Notable for the following components:    Pro-Beta Natriuretic Peptide 2,990 (*)    EKG    Rate, intervals and axes as noted on EKG Report.  Rate: 60 bpm  Rhythm: Sinus Rhythm  Reading: LVH with strain, abnormal but no acute injury pattern.  Admission disposition: 6/6/2025 11:37 AM    Disposition and Plan     Clinical Impression:  1. Acute necrotizing pancreatitis (HCC)       Disposition:  Admit  6/6/2025 11:37 am       HISTORY AND PHYSICAL EXAMINATION     CHIEF COMPLAINT:  Acute necrotizing pancreatitis.      HISTORY OF PRESENT ILLNESS:  Patient is an 79-year-old  male who was hospitalized in March 2025 with a similar but less intense incidence of acute pancreatitis.  At that time, no etiology could be found.  He had a negative HIDA scan, normal liver function tests, and his pancreatitis was considered idiopathic.  He followed up with Gastroenterology as an outpatient, and he was supposed to have an MRCP with and without contrast and the plan was to wait until his pancreatitis resolved completely and the inflammation was gone.  He told his family that he has been having midepigastric abdominal pain associated with nausea and poor appetite for the last 5 days.  He informed his family about his symptoms today.  They brought him into the emergency department for evaluation.  CBC showed hemoglobin of 11.6, no leukocytosis or left shift.  Hemoglobin has been 11.6 which has dropped from 12.4 from March 2025.  Chemistry showed GFR of 50 which is slightly below his baseline.  ProBNP 2900 but patient appeared  clinically dehydrated.  Urinalysis showed no evidence of urinary tract infection.  Lipase minimally elevated at 69, glucose 170, and sodium 135.  Chest x-ray showed no acute findings.  CT scan of the abdomen showed interval development of large, multilobulated, peripherally enhancing, thick-walled, 8.6 x 6.5 cm collection at the pancreatic neck and body with intrinsic foci of gas in comparison of March 2025.  There is a smaller thick-walled collection of fluid and gas in the uncinate process that spans 4.2 x 2.2 cm which is also new.  Findings concerning for multifocal walled-off pancreatic necrosis secondary to necrotizing pancreatitis.  There is associated narrowing and probable partial occlusion of the splenic vein, upper superior mesenteric vein, and base of the main portal vein, findings are also new.  Mild peripancreatic and upper mesenteric inflammatory stranding which is likely reactive to the aforementioned subacute necrotizing pancreatitis.  Trace free fluid in the pelvis, also likely reactive.  No other acute findings on CT scan.  Patient will be admitted to the hospital for further management.     PAST MEDICAL HISTORY:  Recent idiopathic pancreatitis as outlined above in March 2025.  Diabetes mellitus type 2, noninsulin dependent.  Hypertension.  Hyperlipidemia.  Chronic kidney disease stage 2.  Gastritis.  Gastroesophageal reflux disease.  Evident on imaging study prostatomegaly.      REVIEW OF SYSTEMS:  Patient describes midepigastric abdominal pain radiating to bilateral upper quadrants, intense in nature, associated with nausea, poor appetite, food will increase the pain level.  He vomited once today.  No fever or chills.  No recent trauma or new medications.         PHYSICAL EXAMINATION:    GENERAL:  Alert, oriented to time, place, and person.  Moderate distress.  Appears fatigued and cachectic.   VITAL SIGNS:  Temperature 98.0, pulse 58, respiratory rate 21, blood pressure 130/58, pulse ox 95% on  room air.    HEENT:  Atraumatic.  Oropharynx clear.  Dry mucous membranes.  Ears, nose normal.  Eyes:  Anicteric sclerae.    NECK:  Supple.  No lymphadenopathy.  Trachea midline.   LUNGS:  Clear to auscultation bilaterally.  Normal respiratory effort.   HEART:  Regular rate and rhythm.  S1, S2 auscultated.  No murmur.    ABDOMEN:  Soft, nondistended.  Tenderness noted in epigastric and bilateral upper quadrant areas.  No guarding or rebound tenderness.  Hypoactive bowel sounds.    EXTREMITIES:  No peripheral edema, clubbing, or cyanosis.    NEUROLOGIC:  Motor and sensory intact.     ASSESSMENT:    1.       Acute necrotizing pancreatitis with 2 walled-off thick fluid collections at the neck and body and uncinate process as outlined above.  2.       Partially occluded most likely secondary to surrounding inflammatory changes splenic, superior mesenteric, and base of portal vein.  3.       Diabetes mellitus type 2.  4.       Dehydration with mild acute kidney injury.  5.       Essential hypertension.  Current blood pressure is relatively low.   6.       Abdominal pain secondary to above.     PLAN:  Patient will be admitted to general medical floor.  IV fluid, LR at 150 mL/h.  Pain control, n.p.o.  Monitor hemodynamic status.  Obtain gastroenterology consult.  Triglyceride level was obtained last admission which was normal.  Monitor Accu-Cheks, and sliding scale insulin.              6/8/25     Assessment & Plan:   ----------------------------------  Acute necrotizing pancreatitis.  Patient was here in late March with acute pancreatitis.  Workup was not revealing for specific etiology.   Feeling almost back to his baseline  - GI following  - Continue current diet  - cont IVF  - Pain control adequate     Hemoglobin drop.  No physical exam findings to suggest retroperitoneal bleed though he is at risk with his necrotizing pancreatitis.  Baseline hemoglobin is around 13, admitted with 11.6  - this AM 10  - cont to  monitor     Bradycardia  - occurred while sleeping, asymptomatic  - noted to have type 2 HB on tele, cards was consulted  - 2d echo pending  - will avoid AV kirstie blocking agents  - cont to monitor      Other problems  Type 2 diabetes  Hypertension  Dyslipidemia  CKD stage II  Gastritis  GERD      Subjective:   Abd pain resolved, tolerating diet  Noted to be shira on tele at night, asymptomatic  Denies any dizziness currently      Temp:  [97.8 °F (36.6 °C)-100.1 °F (37.8 °C)] 98.2 °F (36.8 °C)  Pulse:  [49-66] 53  Resp:  [16-18] 16  BP: (128-134)/(59-88) 131/60  SpO2:  [95 %-98 %] 95 %  Gen: A+Ox3.  HEENT: NCAT, neck supple, no carotid bruit.  CV: RRR, S1S2, and intact distal pulses. No gallop, rub, murmur.  Pulm: Effort and breath sounds normal. No distress, wheezes, rales, rhonchi.  Abd: Soft, NTND, BS normal, no mass, no HSM, no rebound/guarding.  No flank bruising.  Neuro:  Normal reflexes, CN. Sensory/motor exams grossly normal deficit.   MS: No joint effusions.  No peripheral edema.  Skin: Skin is warm and dry. No rashes, erythema, diaphoresis.   Psych:   Normal mood and affect. Calm, cooperative     Lab Results   Component Value Date     HGB 10.0 (L) 06/08/2025     WBC 5.1 06/08/2025     .0 (L) 06/08/2025      06/08/2025     K 4.3 06/08/2025     CREATSERUM 1.04 06/08/2025     AST 24 06/08/2025     ALT 20 06/08/2025         [Scheduled Medications]   pantoprazole  40 mg Oral Daily    atorvastatin  20 mg Oral Nightly    insulin aspart  1-5 Units Subcutaneous TID CC                        DATE OF DISCHARGE:  6/9/25    Discharge Summary        Date of Admission: 6/6/2025      Disposition: Home Health Care Services                   Date of Discharge: 6/9/25     Admitting Diagnosis: Acute necrotizing pancreatitis (HCC) [K85.91]     Hospital Discharge Diagnoses: acute necrotizing pancreatitis, Second degree type 1 heart block      Reason for Admission: abd pain     Physical Exam:   Vitals:     06/09/25  0855   BP: 133/57   Pulse: 55   Resp: 16   Temp: 97.9 °F (36.6 °C)   Gen: A+Ox3.  No distress.   HEENT: NCAT, neck supple, no carotid bruit.  CV: RRR, S1S2, and intact distal pulses. No gallop, rub, murmur.  Pulm: Effort and breath sounds normal. No distress, wheezes, rales, rhonchi.  Abd: Soft, NTND, BS normal, no mass, no HSM, no rebound/guarding.  No flank bruising.  Neuro:  Normal reflexes, CN. Sensory/motor exams grossly normal deficit.   MS: No joint effusions.  No peripheral edema.  Skin: Skin is warm and dry. No rashes, erythema, diaphoresis.   Psych:   Normal mood and affect. Calm, cooperative        History of Present Illness:   Per Dr. Fleming  Patient is an 79-year-old  male who was hospitalized in March 2025 with a similar but less intense incidence of acute pancreatitis. At that time, no etiology could be found. He had a negative HIDA scan, normal liver function tests, and his pancreatitis was considered idiopathic. He followed up with Gastroenterology as an outpatient, and he was supposed to have an MRCP with and without contrast and the plan was to wait until his pancreatitis resolved completely and the inflammation was gone. He told his family that he has been having midepigastric abdominal pain associated with nausea and poor appetite for the last 5 days. He informed his family about his symptoms today. They brought him into the emergency department for evaluation. CBC showed hemoglobin of 11.6, no leukocytosis or left shift. Hemoglobin has been 11.6 which has dropped from 12.4 from March 2025. Chemistry showed GFR of 50 which is slightly below his baseline. ProBNP 2900 but patient appeared clinically dehydrated. Urinalysis showed no evidence of urinary tract infection. Lipase minimally elevated at 69, glucose 170, and sodium 135. Chest x-ray showed no acute findings. CT scan of the abdomen showed interval development of large, multilobulated, peripherally enhancing, thick-walled, 8.6 x  6.5 cm collection at the pancreatic neck and body with intrinsic foci of gas in comparison of March 2025. There is a smaller thick-walled collection of fluid and gas in the uncinate process that spans 4.2 x 2.2 cm which is also new. Findings concerning for multifocal walled-off pancreatic necrosis secondary to necrotizing pancreatitis. There is associated narrowing and probable partial occlusion of the splenic vein, upper superior mesenteric vein, and base of the main portal vein, findings are also new. Mild peripancreatic and upper mesenteric inflammatory stranding which is likely reactive to the aforementioned subacute necrotizing pancreatitis. Trace free fluid in the pelvis, also likely reactive. No other acute findings on CT scan. Patient will be admitted to the hospital for further management.       Hospital Course:   Acute necrotizing pancreatitis.  Patient was here in late March with acute pancreatitis.  Workup was not revealing for specific etiology.   Feeling back to his baseline   - GI was following  - Continue current diet  - rec'd IVF     Hemoglobin drop.  No physical exam findings to suggest retroperitoneal bleed though he is at risk with his necrotizing pancreatitis.  Baseline hemoglobin is around 13, admitted with 11.6  - this AM 10.6  - stable     Second Degree Type 1 heart block  - intermittent, overnight   - cards was consulted  - 2d echo reviewed  - will avoid AV kirstie blocking agents  - cont to monitor      Other problems  Type 2 diabetes  Hypertension  Dyslipidemia  CKD stage II  Gastritis  GERD     Consultations: Cardiology, GI  Medications 06/06 06/07 06/08 06/09      iopamidol 76% (ISOVUE-370) injection for power injector  Dose: 80 mL  Freq: IMG once as needed Route: IV  PRN Reason: contrast  Start: 06/06/25 1101 End: 06/06/25 1101    [id number]           magnesium oxide (Mag-Ox) tab 400 mg  Dose: 400 mg  Freq: Once Route: OR  Start: 06/09/25 1015 End: 06/09/25 1148       [id number]        magnesium  oxide (Mag-Ox) tab 400 mg  Dose: 400 mg  Freq: Once Route: OR  Start: 06/08/25 0800 End: 06/08/25 0902      10025         morphINE PF 2 MG/ML injection 2 mg  Dose: 2 mg  Freq: Once Route: IV  Start: 06/06/25 0924 End: 06/06/25 0942    [id number]           ondansetron (Zofran) 4 MG/2ML injection 4 mg  Dose: 4 mg  Freq: Once Route: IV  Start: 06/06/25 0924 End: 06/06/25 0941    [id number]           sodium chloride 0.9 % IV bolus 1,000 mL  Dose: 1,000 mL  Freq: Once Route: IV  Start: 06/06/25 0924 End: 06/06/25 1041    [id number]     59887           sodium chloride 0.9% infusion 1,000 mL  Dose: 1,000 mL  Freq: Once Route: IV  Start: 06/06/25 1220 End: 06/06/25 1223    [id number]     [id number]              Medications 06/06 06/07 06/08 06/09   acetaminophen (Tylenol Extra Strength) tab 500 mg  Dose: 500 mg  Freq: Every 4 hours PRN Route: OR  PRN Reason: Fever  PRN Comment: equal to or greater than 100.4  Start: 06/06/25 1305 End: 06/09/25 1635   Admin Instructions:   do not initiate oral therapy until 6-8 hours after the last IV acetaminophen dose if IV acetaminophen was used previously     [id number]      [id number]         atorvastatin (Lipitor) tab 20 mg  Dose: 20 mg  Freq: Nightly Route: OR  Start: 06/07/25 2100 End: 06/09/25 1026     [id number]      [id number]         lactated ringers infusion  Rate: 75 mL/hr  Freq: Continuous Route: IV  Start: 06/07/25 0945 End: 06/09/25 1635     [id number]      [id number]     [id number]      [id number]        lactated ringers infusion  Rate: 150 mL/hr  Freq: Continuous Route: IV  Start: 06/06/25 1315 End: 06/07/25 0944    [id number]     [id number]      [id number]          Or   morphINE PF 4 MG/ML injection 4 mg  Dose: 4 mg  Freq: Every 2 hour PRN Route: IV  PRN Reason: severe pain  Start: 06/06/25 1305 End: 06/09/25 1635   Admin Instructions:   Use PRN reason as a guide and follow range order policy. If oral pain meds are ordered and patient can tolerate oral intake, start with PRN oral pain medications first.    [id number]     [id number]            pantoprazole (Protonix) DR tab 40 mg  Dose: 40 mg  Freq: Daily (RT) Route: OR  Start: 06/08/25 0900 End: 06/09/25 1635   Admin Instructions:   Do not crush      [id number]      [id number]        Vitals (last day) before discharge       Date/Time Temp Pulse Resp BP SpO2 Weight O2 Device O2 Flow Rate (L/min) [facility]    06/09/25 0855 97.9 °F (36.6 °C) 55 16 133/57 99 % -- None (Room air) -- CK    06/09/25 0324 98 °F (36.7 °C) 55 16 136/64 99 % -- Nasal cannula 2 L/min [facility]    06/08/25 2027 98.5 °F (36.9 °C) 56 16 118/58 96 % -- None (Room air) -- [facility]    06/08/25 1454 98.6 °F (37 °C) 59 18 140/61 97 % -- None (Room air) -- GA    06/08/25 0900 98.2 °F (36.8 °C) 53 16 131/60 95 % -- None (Room air) -- [facility]    06/08/25 0437 99.2 °F (37.3 °C) 49 18 128/59 96 % 165 lb (74.8 kg) None (Room air) -- NM

## (undated) NOTE — LETTER
Hospital Discharge Documentation    From: [facility] Hospitalist's Office  Phone: [phone]    Patient discharged time/date: 2025  2:34 PM  Patient discharge disposition:  Home with [facility]       Discharge Summary - D/C Summary        Discharge Summary signed by Andres Donnelly MD at 6/10/2025  9:23 AM  Version 1 of 1      Author: Andres Donnelly MD Service: Hospitalist Author Type: Physician    Filed: 6/10/2025  9:23 AM Date of Service: 2025 11:03 AM Status: Signed    : Andres Donnelly MD (Physician)           [facility]  part of [facility]    Discharge Summary    Francisco Del Rio Patient Status:  Inpatient    10/24/1945 MRN [id number]   Location [facility] 5SW/SE Attending Andres Donnelly MD   Hosp Day # 3 PCP IRIS BOLAÑOS MD     Date of Admission: 2025 Disposition: Home Health Care Services     Date of Discharge: 25    Admitting Diagnosis: Acute necrotizing pancreatitis (HCC) [K85.91]    Hospital Discharge Diagnoses: acute necrotizing pancreatitis, Second degree type 1 heart block    Lace+ Score: 63  59-90 High Risk  29-58 Medium Risk  0-28   Low Risk.    TCM Follow-Up Recommendation:  LACE > 58: High Risk of readmission after discharge from the hospital.    Problem List: Problem List[1]    Reason for Admission: abd pain    Physical Exam:   Vitals:    25 0855   BP: 133/57   Pulse: 55   Resp: 16   Temp: 97.9 °F (36.6 °C)   Gen: A+Ox3.  No distress.   HEENT: NCAT, neck supple, no carotid bruit.  CV: RRR, S1S2, and intact distal pulses. No gallop, rub, murmur.  Pulm: Effort and breath sounds normal. No distress, wheezes, rales, rhonchi.  Abd: Soft, NTND, BS normal, no mass, no HSM, no rebound/guarding.  No flank bruising.  Neuro:  Normal reflexes, CN. Sensory/motor exams grossly normal deficit.   MS: No joint effusions.  No peripheral edema.  Skin: Skin is warm and dry. No rashes, erythema, diaphoresis.    Psych:   Normal mood and affect. Calm, cooperative      History of Present Illness:   Per Dr. Fleming  Patient is an 79-year-old  male who was hospitalized in March 2025 with a similar but less intense incidence of acute pancreatitis. At that time, no etiology could be found. He had a negative HIDA scan, normal liver function tests, and his pancreatitis was considered idiopathic. He followed up with Gastroenterology as an outpatient, and he was supposed to have an MRCP with and without contrast and the plan was to wait until his pancreatitis resolved completely and the inflammation was gone. He told his family that he has been having midepigastric abdominal pain associated with nausea and poor appetite for the last 5 days. He informed his family about his symptoms today. They brought him into the emergency department for evaluation. CBC showed hemoglobin of 11.6, no leukocytosis or left shift. Hemoglobin has been 11.6 which has dropped from 12.4 from March 2025. Chemistry showed GFR of 50 which is slightly below his baseline. ProBNP 2900 but patient appeared clinically dehydrated. Urinalysis showed no evidence of urinary tract infection. Lipase minimally elevated at 69, glucose 170, and sodium 135. Chest x-ray showed no acute findings. CT scan of the abdomen showed interval development of large, multilobulated, peripherally enhancing, thick-walled, 8.6 x 6.5 cm collection at the pancreatic neck and body with intrinsic foci of gas in comparison of March 2025. There is a smaller thick-walled collection of fluid and gas in the uncinate process that spans 4.2 x 2.2 cm which is also new. Findings concerning for multifocal walled-off pancreatic necrosis secondary to necrotizing pancreatitis. There is associated narrowing and probable partial occlusion of the splenic vein, upper superior mesenteric vein, and base of the main portal vein, findings are also new. Mild peripancreatic and upper mesenteric  inflammatory stranding which is likely reactive to the aforementioned subacute necrotizing pancreatitis. Trace free fluid in the pelvis, also likely reactive. No other acute findings on CT scan. Patient will be admitted to the hospital for further management.      Hospital Course:   Acute necrotizing pancreatitis.  Patient was here in late March with acute pancreatitis.  Workup was not revealing for specific etiology.   Feeling back to his baseline   - GI was following  - Continue current diet  - rec'd IVF     Hemoglobin drop.  No physical exam findings to suggest retroperitoneal bleed though he is at risk with his necrotizing pancreatitis.  Baseline hemoglobin is around 13, admitted with 11.6  - this AM 10.6  - stable     Second Degree Type 1 heart block  - intermittent, overnight   - cards was consulted  - 2d echo reviewed  - will avoid AV kirstie blocking agents  - cont to monitor      Other problems  Type 2 diabetes  Hypertension  Dyslipidemia  CKD stage II  Gastritis  GERD    Consultations: Cardiology, GI    Procedures: none    Complications: none    Discharge Condition: Stable    Discharge Medications:      Discharge Medications        CHANGE how you take these medications        Instructions Prescription details   metFORMIN 500 MG Tabs  Commonly known as: Glucophage  What changed:   how much to take  how to take this  when to take this  additional instructions      1/2 tab PO q12 hrs. Pre meals.   Quantity: 90 tablet  Refills: 1            CONTINUE taking these medications        Instructions Prescription details   aspirin 81 MG Tabs      Take 1 tablet by mouth daily.   Quantity: 90 tablet  Refills: 1     FreeStyle Lancets Misc      as directed   Refills: 0     FreeStyle Lite Suzanne      as directed   Refills: 0     Glucose Blood Strp  Commonly known as: FreeStyle Lite Test      Dx. 250.02. take by Misc.(Non-Drug; Combo Route) route twice a day   Quantity: 100 strip  Refills: 6     lisinopril 10 MG Tabs  Commonly  known as: Zestril      Take 0.5 tablets (5 mg total) by mouth daily.   Quantity: 90 tablet  Refills: 1     pantoprazole 40 MG Tbec  Commonly known as: Protonix      Take 1 tablet (40 mg total) by mouth once daily.   Quantity: 90 tablet  Refills: 1            STOP taking these medications      simvastatin 40 MG Tabs  Commonly known as: Zocor                 Greater than 35 minutes spent, >50% spent counseling re: treatment plan and workup     Andres Donnelly MD  6/9/2025           [1]   Patient Active Problem List  Diagnosis    Type II diabetes mellitus, uncontrolled    Hypercholesterolemia    Hypertension, benign    Tobacco use disorder    Acute renal failure    Herpes zoster    Pancreatitis (HCC)    Acute pancreatitis, unspecified complication status, unspecified pancreatitis type (HCC)    Acute cholecystitis    Hyponatremia    Thrombocytopenia (HCC)    Hyperglycemia    Acute necrotizing pancreatitis (HCC)       Electronically signed by Andres Donnelly MD on 6/10/2025  9:23 AM